# Patient Record
Sex: FEMALE | Race: WHITE | Employment: UNEMPLOYED | URBAN - METROPOLITAN AREA
[De-identification: names, ages, dates, MRNs, and addresses within clinical notes are randomized per-mention and may not be internally consistent; named-entity substitution may affect disease eponyms.]

---

## 2017-01-11 ENCOUNTER — OFFICE VISIT (OUTPATIENT)
Dept: ORTHOPEDIC SURGERY | Age: 82
End: 2017-01-11

## 2017-01-11 VITALS
WEIGHT: 218.92 LBS | SYSTOLIC BLOOD PRESSURE: 100 MMHG | BODY MASS INDEX: 38.79 KG/M2 | DIASTOLIC BLOOD PRESSURE: 70 MMHG | HEIGHT: 63 IN | HEART RATE: 106 BPM

## 2017-01-11 DIAGNOSIS — S83.241A TEAR OF MEDIAL MENISCUS OF RIGHT KNEE, CURRENT, UNSPECIFIED TEAR TYPE, INITIAL ENCOUNTER: Primary | ICD-10-CM

## 2017-01-11 PROCEDURE — 99024 POSTOP FOLLOW-UP VISIT: CPT | Performed by: ORTHOPAEDIC SURGERY

## 2017-04-10 ENCOUNTER — TELEPHONE (OUTPATIENT)
Dept: FAMILY MEDICINE CLINIC | Age: 82
End: 2017-04-10

## 2017-07-17 ENCOUNTER — HOSPITAL ENCOUNTER (OUTPATIENT)
Dept: MAMMOGRAPHY | Age: 82
Discharge: HOME | End: 2017-07-17
Attending: FAMILY MEDICINE | Admitting: FAMILY MEDICINE

## 2017-07-17 ENCOUNTER — OFFICE VISIT (OUTPATIENT)
Dept: FAMILY MEDICINE CLINIC | Age: 82
End: 2017-07-17

## 2017-07-17 VITALS
HEART RATE: 117 BPM | WEIGHT: 217 LBS | HEIGHT: 63 IN | DIASTOLIC BLOOD PRESSURE: 80 MMHG | BODY MASS INDEX: 38.45 KG/M2 | SYSTOLIC BLOOD PRESSURE: 130 MMHG | OXYGEN SATURATION: 97 %

## 2017-07-17 DIAGNOSIS — Z12.31 SCREENING MAMMOGRAM, ENCOUNTER FOR: ICD-10-CM

## 2017-07-17 DIAGNOSIS — I10 ESSENTIAL HYPERTENSION: ICD-10-CM

## 2017-07-17 DIAGNOSIS — M15.9 PRIMARY OSTEOARTHRITIS INVOLVING MULTIPLE JOINTS: ICD-10-CM

## 2017-07-17 DIAGNOSIS — E11.9 TYPE 2 DIABETES MELLITUS WITHOUT COMPLICATION, WITHOUT LONG-TERM CURRENT USE OF INSULIN (HCC): Primary | ICD-10-CM

## 2017-07-17 LAB — HBA1C MFR BLD: 6.7 %

## 2017-07-17 PROCEDURE — 99214 OFFICE O/P EST MOD 30 MIN: CPT | Performed by: FAMILY MEDICINE

## 2017-07-17 PROCEDURE — 83036 HEMOGLOBIN GLYCOSYLATED A1C: CPT | Performed by: FAMILY MEDICINE

## 2017-07-17 RX ORDER — ATORVASTATIN CALCIUM 10 MG/1
TABLET, FILM COATED ORAL
Qty: 45 TABLET | Refills: 0 | Status: SHIPPED | OUTPATIENT
Start: 2017-07-17 | End: 2017-09-18

## 2017-07-17 RX ORDER — LISINOPRIL AND HYDROCHLOROTHIAZIDE 12.5; 1 MG/1; MG/1
TABLET ORAL
Qty: 180 TABLET | Refills: 1 | Status: SHIPPED | OUTPATIENT
Start: 2017-07-17 | End: 2017-12-05 | Stop reason: SDUPTHER

## 2017-07-17 RX ORDER — AMLODIPINE BESYLATE 5 MG/1
TABLET ORAL
Qty: 90 TABLET | Refills: 1 | Status: SHIPPED | OUTPATIENT
Start: 2017-07-17 | End: 2017-12-05 | Stop reason: SDUPTHER

## 2017-07-17 ASSESSMENT — ENCOUNTER SYMPTOMS
RESPIRATORY NEGATIVE: 1
GASTROINTESTINAL NEGATIVE: 1

## 2017-07-17 ASSESSMENT — PATIENT HEALTH QUESTIONNAIRE - PHQ9
SUM OF ALL RESPONSES TO PHQ QUESTIONS 1-9: 0
SUM OF ALL RESPONSES TO PHQ9 QUESTIONS 1 & 2: 0
2. FEELING DOWN, DEPRESSED OR HOPELESS: 0
1. LITTLE INTEREST OR PLEASURE IN DOING THINGS: 0

## 2017-09-18 ENCOUNTER — TELEPHONE (OUTPATIENT)
Dept: FAMILY MEDICINE CLINIC | Age: 82
End: 2017-09-18

## 2017-09-18 RX ORDER — ROSUVASTATIN CALCIUM 5 MG/1
5 TABLET, COATED ORAL NIGHTLY
Qty: 90 TABLET | Refills: 0 | Status: SHIPPED | OUTPATIENT
Start: 2017-09-18 | End: 2019-07-22

## 2017-11-01 NOTE — TELEPHONE ENCOUNTER
Last office visit 7/17/2017     Last written 6/13/17     Next office visit scheduled 1/17/2018    Requested Prescriptions     Pending Prescriptions Disp Refills    metFORMIN (GLUCOPHAGE) 500 MG tablet [Pharmacy Med Name: METFORMIN  MG Tablet] 90 tablet 1     Sig: TAKE 1 TABLET EVERY DAY

## 2017-12-06 RX ORDER — LISINOPRIL AND HYDROCHLOROTHIAZIDE 12.5; 1 MG/1; MG/1
TABLET ORAL
Qty: 180 TABLET | Refills: 1 | Status: SHIPPED | OUTPATIENT
Start: 2017-12-06 | End: 2018-07-12 | Stop reason: SDUPTHER

## 2017-12-06 RX ORDER — AMLODIPINE BESYLATE 5 MG/1
TABLET ORAL
Qty: 90 TABLET | Refills: 1 | Status: SHIPPED | OUTPATIENT
Start: 2017-12-06 | End: 2018-07-12 | Stop reason: SDUPTHER

## 2018-05-02 ENCOUNTER — OFFICE VISIT (OUTPATIENT)
Dept: FAMILY MEDICINE CLINIC | Age: 83
End: 2018-05-02

## 2018-05-02 VITALS
HEART RATE: 115 BPM | BODY MASS INDEX: 38.8 KG/M2 | OXYGEN SATURATION: 96 % | SYSTOLIC BLOOD PRESSURE: 115 MMHG | HEIGHT: 63 IN | DIASTOLIC BLOOD PRESSURE: 68 MMHG | WEIGHT: 219 LBS

## 2018-05-02 DIAGNOSIS — I10 ESSENTIAL HYPERTENSION: ICD-10-CM

## 2018-05-02 DIAGNOSIS — E55.9 VITAMIN D DEFICIENCY: ICD-10-CM

## 2018-05-02 DIAGNOSIS — E78.2 MIXED HYPERLIPIDEMIA: ICD-10-CM

## 2018-05-02 DIAGNOSIS — Z13.29 THYROID DISORDER SCREEN: ICD-10-CM

## 2018-05-02 DIAGNOSIS — E11.9 TYPE 2 DIABETES MELLITUS WITHOUT COMPLICATION, WITHOUT LONG-TERM CURRENT USE OF INSULIN (HCC): Primary | ICD-10-CM

## 2018-05-02 LAB — HBA1C MFR BLD: 7 %

## 2018-05-02 PROCEDURE — G8427 DOCREV CUR MEDS BY ELIG CLIN: HCPCS | Performed by: FAMILY MEDICINE

## 2018-05-02 PROCEDURE — G8400 PT W/DXA NO RESULTS DOC: HCPCS | Performed by: FAMILY MEDICINE

## 2018-05-02 PROCEDURE — 99214 OFFICE O/P EST MOD 30 MIN: CPT | Performed by: FAMILY MEDICINE

## 2018-05-02 PROCEDURE — 1090F PRES/ABSN URINE INCON ASSESS: CPT | Performed by: FAMILY MEDICINE

## 2018-05-02 PROCEDURE — 1036F TOBACCO NON-USER: CPT | Performed by: FAMILY MEDICINE

## 2018-05-02 PROCEDURE — 4040F PNEUMOC VAC/ADMIN/RCVD: CPT | Performed by: FAMILY MEDICINE

## 2018-05-02 PROCEDURE — 1123F ACP DISCUSS/DSCN MKR DOCD: CPT | Performed by: FAMILY MEDICINE

## 2018-05-02 PROCEDURE — 83036 HEMOGLOBIN GLYCOSYLATED A1C: CPT | Performed by: FAMILY MEDICINE

## 2018-05-02 PROCEDURE — G8417 CALC BMI ABV UP PARAM F/U: HCPCS | Performed by: FAMILY MEDICINE

## 2018-05-02 ASSESSMENT — ENCOUNTER SYMPTOMS
RESPIRATORY NEGATIVE: 1
GASTROINTESTINAL NEGATIVE: 1

## 2018-05-03 DIAGNOSIS — E83.52 HYPERCALCEMIA: Primary | ICD-10-CM

## 2018-05-03 LAB
A/G RATIO: 1.8 (ref 1.1–2.2)
ALBUMIN SERPL-MCNC: 4.4 G/DL (ref 3.4–5)
ALP BLD-CCNC: 82 U/L (ref 40–129)
ALT SERPL-CCNC: 19 U/L (ref 10–40)
ANION GAP SERPL CALCULATED.3IONS-SCNC: 16 MMOL/L (ref 3–16)
AST SERPL-CCNC: 21 U/L (ref 15–37)
BASOPHILS ABSOLUTE: 0.1 K/UL (ref 0–0.2)
BASOPHILS RELATIVE PERCENT: 0.8 %
BILIRUB SERPL-MCNC: <0.2 MG/DL (ref 0–1)
BUN BLDV-MCNC: 20 MG/DL (ref 7–20)
CALCIUM SERPL-MCNC: 11.2 MG/DL (ref 8.3–10.6)
CHLORIDE BLD-SCNC: 98 MMOL/L (ref 99–110)
CHOLESTEROL, TOTAL: 283 MG/DL (ref 0–199)
CO2: 28 MMOL/L (ref 21–32)
CREAT SERPL-MCNC: 0.9 MG/DL (ref 0.6–1.2)
EOSINOPHILS ABSOLUTE: 0.2 K/UL (ref 0–0.6)
EOSINOPHILS RELATIVE PERCENT: 2.4 %
GFR AFRICAN AMERICAN: >60
GFR NON-AFRICAN AMERICAN: 60
GLOBULIN: 2.5 G/DL
GLUCOSE BLD-MCNC: 137 MG/DL (ref 70–99)
HCT VFR BLD CALC: 40.2 % (ref 36–48)
HDLC SERPL-MCNC: 57 MG/DL (ref 40–60)
HEMOGLOBIN: 13.5 G/DL (ref 12–16)
LDL CHOLESTEROL CALCULATED: ABNORMAL MG/DL
LDL CHOLESTEROL DIRECT: 177 MG/DL
LYMPHOCYTES ABSOLUTE: 1.8 K/UL (ref 1–5.1)
LYMPHOCYTES RELATIVE PERCENT: 21.8 %
MCH RBC QN AUTO: 28.8 PG (ref 26–34)
MCHC RBC AUTO-ENTMCNC: 33.7 G/DL (ref 31–36)
MCV RBC AUTO: 85.4 FL (ref 80–100)
MONOCYTES ABSOLUTE: 0.6 K/UL (ref 0–1.3)
MONOCYTES RELATIVE PERCENT: 7.5 %
NEUTROPHILS ABSOLUTE: 5.5 K/UL (ref 1.7–7.7)
NEUTROPHILS RELATIVE PERCENT: 67.5 %
PARATHYROID HORMONE INTACT: 77.7 PG/ML (ref 14–72)
PDW BLD-RTO: 14.5 % (ref 12.4–15.4)
PLATELET # BLD: 295 K/UL (ref 135–450)
PMV BLD AUTO: 8.8 FL (ref 5–10.5)
POTASSIUM SERPL-SCNC: 4.4 MMOL/L (ref 3.5–5.1)
RBC # BLD: 4.7 M/UL (ref 4–5.2)
SODIUM BLD-SCNC: 142 MMOL/L (ref 136–145)
T4 FREE: 1.1 NG/DL (ref 0.9–1.8)
TOTAL PROTEIN: 6.9 G/DL (ref 6.4–8.2)
TRIGL SERPL-MCNC: 389 MG/DL (ref 0–150)
TSH SERPL DL<=0.05 MIU/L-ACNC: 4.03 UIU/ML (ref 0.27–4.2)
VITAMIN D 25-HYDROXY: 37.2 NG/ML
VLDLC SERPL CALC-MCNC: ABNORMAL MG/DL
WBC # BLD: 8.1 K/UL (ref 4–11)

## 2018-05-03 RX ORDER — GLUCOSAMINE HCL/CHONDROITIN SU 500-400 MG
1 CAPSULE ORAL 3 TIMES DAILY
Qty: 100 STRIP | Refills: 3 | Status: SHIPPED | OUTPATIENT
Start: 2018-05-03

## 2018-07-12 RX ORDER — AMLODIPINE BESYLATE 5 MG/1
TABLET ORAL
Qty: 90 TABLET | Refills: 1 | Status: SHIPPED | OUTPATIENT
Start: 2018-07-12 | End: 2019-01-07 | Stop reason: SDUPTHER

## 2018-07-12 RX ORDER — LISINOPRIL AND HYDROCHLOROTHIAZIDE 12.5; 1 MG/1; MG/1
TABLET ORAL
Qty: 180 TABLET | Refills: 1 | Status: SHIPPED | OUTPATIENT
Start: 2018-07-12 | End: 2019-01-07 | Stop reason: SDUPTHER

## 2018-07-12 NOTE — TELEPHONE ENCOUNTER
Last Seen: 5/2/2018  Next Appointment: 11/7/2018    Requested Prescriptions     Pending Prescriptions Disp Refills    lisinopril-hydrochlorothiazide (PRINZIDE;ZESTORETIC) 10-12.5 MG per tablet [Pharmacy Med Name: LISINOPRIL/HYDROCHLOROTHIAZIDE 10-12.5 MG Tablet] 180 tablet 1     Sig: TAKE 1 TABLET TWICE DAILY    amLODIPine (NORVASC) 5 MG tablet [Pharmacy Med Name: AMLODIPINE BESYLATE 5 MG Tablet] 90 tablet 1     Sig: TAKE 1 TABLET EVERY DAY

## 2018-11-07 ENCOUNTER — OFFICE VISIT (OUTPATIENT)
Dept: FAMILY MEDICINE CLINIC | Age: 83
End: 2018-11-07
Payer: MEDICARE

## 2018-11-07 VITALS
SYSTOLIC BLOOD PRESSURE: 108 MMHG | HEART RATE: 100 BPM | OXYGEN SATURATION: 93 % | DIASTOLIC BLOOD PRESSURE: 62 MMHG | BODY MASS INDEX: 38.09 KG/M2 | WEIGHT: 215 LBS | HEIGHT: 63 IN

## 2018-11-07 DIAGNOSIS — I10 ESSENTIAL HYPERTENSION: ICD-10-CM

## 2018-11-07 DIAGNOSIS — M79.674 GREAT TOE PAIN, RIGHT: ICD-10-CM

## 2018-11-07 DIAGNOSIS — E11.9 TYPE 2 DIABETES MELLITUS WITHOUT COMPLICATION, WITHOUT LONG-TERM CURRENT USE OF INSULIN (HCC): Primary | ICD-10-CM

## 2018-11-07 PROBLEM — E79.0 HYPERURICEMIA: Status: ACTIVE | Noted: 2018-11-07

## 2018-11-07 LAB
HBA1C MFR BLD: 7 %
URIC ACID, SERUM: 8.7 MG/DL (ref 2.6–6)

## 2018-11-07 PROCEDURE — 1090F PRES/ABSN URINE INCON ASSESS: CPT | Performed by: FAMILY MEDICINE

## 2018-11-07 PROCEDURE — G8427 DOCREV CUR MEDS BY ELIG CLIN: HCPCS | Performed by: FAMILY MEDICINE

## 2018-11-07 PROCEDURE — G8482 FLU IMMUNIZE ORDER/ADMIN: HCPCS | Performed by: FAMILY MEDICINE

## 2018-11-07 PROCEDURE — 1036F TOBACCO NON-USER: CPT | Performed by: FAMILY MEDICINE

## 2018-11-07 PROCEDURE — 1101F PT FALLS ASSESS-DOCD LE1/YR: CPT | Performed by: FAMILY MEDICINE

## 2018-11-07 PROCEDURE — G8417 CALC BMI ABV UP PARAM F/U: HCPCS | Performed by: FAMILY MEDICINE

## 2018-11-07 PROCEDURE — 99214 OFFICE O/P EST MOD 30 MIN: CPT | Performed by: FAMILY MEDICINE

## 2018-11-07 PROCEDURE — 4040F PNEUMOC VAC/ADMIN/RCVD: CPT | Performed by: FAMILY MEDICINE

## 2018-11-07 PROCEDURE — 83036 HEMOGLOBIN GLYCOSYLATED A1C: CPT | Performed by: FAMILY MEDICINE

## 2018-11-07 PROCEDURE — 1123F ACP DISCUSS/DSCN MKR DOCD: CPT | Performed by: FAMILY MEDICINE

## 2018-11-07 PROCEDURE — G8400 PT W/DXA NO RESULTS DOC: HCPCS | Performed by: FAMILY MEDICINE

## 2018-11-07 RX ORDER — ALLOPURINOL 100 MG/1
100 TABLET ORAL DAILY
Qty: 90 TABLET | Refills: 0 | Status: SHIPPED | OUTPATIENT
Start: 2018-11-07 | End: 2019-07-22

## 2018-11-07 ASSESSMENT — PATIENT HEALTH QUESTIONNAIRE - PHQ9
2. FEELING DOWN, DEPRESSED OR HOPELESS: 0
SUM OF ALL RESPONSES TO PHQ QUESTIONS 1-9: 0
SUM OF ALL RESPONSES TO PHQ QUESTIONS 1-9: 0
1. LITTLE INTEREST OR PLEASURE IN DOING THINGS: 0
SUM OF ALL RESPONSES TO PHQ9 QUESTIONS 1 & 2: 0

## 2018-11-07 ASSESSMENT — ENCOUNTER SYMPTOMS
ABDOMINAL PAIN: 0
SHORTNESS OF BREATH: 0
COUGH: 0
BLOOD IN STOOL: 0

## 2018-11-07 NOTE — PATIENT INSTRUCTIONS
Type 2 diabetes mellitus without complication, without long-term current use of insulin (HCC)  -     POCT glycosylated hemoglobin (Hb A1C)  AIC 7.0-continue meds-lower carbs  Essential hypertension  Continue meds-JOSEP diet     See me 6 mos

## 2018-11-07 NOTE — PROGRESS NOTES
Subjective:      Patient ID: Golden Orozco is a 81 y.o. female. HPI  In for check on DM(OK at home)--HT(not checking-will start)--Had episode of pain Rt MT-P jtlasted 5 days-no meds    Prior to Visit Medications :  Medication metFORMIN (GLUCOPHAGE) 500 MG tablet, Sig TAKE 1 TABLET EVERY DAY, Taking? Yes, Authorizing Provider Gilbert Santos, DO    Medication lisinopril-hydrochlorothiazide (PRINZIDE;ZESTORETIC) 10-12.5 MG per tablet, Sig TAKE 1 TABLET TWICE DAILY, Taking? Yes, Authorizing Provider Gilbert Santos, DO    Medication amLODIPine (NORVASC) 5 MG tablet, Sig TAKE 1 TABLET EVERY DAY, Taking? Yes, Authorizing Provider Gilbert Santos, DO    Medication Glucose Blood (BLOOD GLUCOSE TEST STRIPS) STRP, Sig 1 strip by Does not apply route three times daily Patient requesting True Results brand, Taking? Yes, Authorizing Provider Gilbert Santos, DO    Medication rosuvastatin (CRESTOR) 5 MG tablet, Sig Take 1 tablet by mouth nightly, Taking? Yes, Authorizing Provider Gilbert Santos, DO    Medication glucose monitoring kit (FREESTYLE) monitoring kit, Sig 1 kit by Does not apply route daily as needed (not prn) Dx E11.9-uses once daily-needs Acucheck Daphne Plus, Taking? Yes, Authorizing Provider Gilbert Santos, DO    Medication glucose blood VI test strips (EXACTECH TEST) strip, Sig 1 each by In Vitro route daily ACCUCHECK DAPHNE-Dx e11.9-uses 1 daily-no isulin, Taking? Yes, Authorizing Provider Marisol Santos, DO    Medication multivitamin (THERAGRAN) per tablet, Sig Take 1 tablet by mouth daily. Indications: OTC, Taking? Yes, Authorizing Provider Jaun An MD    Medication VITAMIN D-3 (COLECALCIFEROL) 400 UNITS TABS, Sig Take  by mouth daily. Indications: OTC, Taking? Yes, Authorizing Provider Jaun An MD    Medication aspirin 81 MG EC tablet, Sig Take 81 mg by mouth daily. Indications: OTC, Taking?  Yes, Authorizing Provider Jaun An MD      Past Medical History:  No date:

## 2018-11-21 ENCOUNTER — HOSPITAL ENCOUNTER (OUTPATIENT)
Dept: MAMMOGRAPHY | Age: 83
Discharge: HOME | End: 2018-11-21
Payer: MEDICARE

## 2018-11-21 DIAGNOSIS — Z12.31 SCREENING MAMMOGRAM, ENCOUNTER FOR: ICD-10-CM

## 2018-11-21 PROCEDURE — 77067 SCR MAMMO BI INCL CAD: CPT

## 2019-01-08 RX ORDER — LISINOPRIL AND HYDROCHLOROTHIAZIDE 12.5; 1 MG/1; MG/1
TABLET ORAL
Qty: 180 TABLET | Refills: 1 | Status: SHIPPED | OUTPATIENT
Start: 2019-01-08 | End: 2019-06-19 | Stop reason: SDUPTHER

## 2019-01-08 RX ORDER — AMLODIPINE BESYLATE 5 MG/1
TABLET ORAL
Qty: 90 TABLET | Refills: 1 | Status: SHIPPED | OUTPATIENT
Start: 2019-01-08 | End: 2019-06-19 | Stop reason: SDUPTHER

## 2019-05-15 ENCOUNTER — OFFICE VISIT (OUTPATIENT)
Dept: FAMILY MEDICINE CLINIC | Age: 84
End: 2019-05-15
Payer: MEDICARE

## 2019-05-15 VITALS
BODY MASS INDEX: 37.74 KG/M2 | HEIGHT: 63 IN | HEART RATE: 132 BPM | WEIGHT: 213 LBS | DIASTOLIC BLOOD PRESSURE: 80 MMHG | SYSTOLIC BLOOD PRESSURE: 135 MMHG | OXYGEN SATURATION: 93 %

## 2019-05-15 DIAGNOSIS — M15.9 PRIMARY OSTEOARTHRITIS INVOLVING MULTIPLE JOINTS: ICD-10-CM

## 2019-05-15 DIAGNOSIS — I10 ESSENTIAL HYPERTENSION: ICD-10-CM

## 2019-05-15 DIAGNOSIS — L27.0 DERMATITIS DUE TO DRUG: ICD-10-CM

## 2019-05-15 DIAGNOSIS — E11.9 TYPE 2 DIABETES MELLITUS WITHOUT COMPLICATION, WITHOUT LONG-TERM CURRENT USE OF INSULIN (HCC): Primary | ICD-10-CM

## 2019-05-15 LAB — HBA1C MFR BLD: 7.3 %

## 2019-05-15 PROCEDURE — 99214 OFFICE O/P EST MOD 30 MIN: CPT | Performed by: FAMILY MEDICINE

## 2019-05-15 PROCEDURE — 1036F TOBACCO NON-USER: CPT | Performed by: FAMILY MEDICINE

## 2019-05-15 PROCEDURE — 1123F ACP DISCUSS/DSCN MKR DOCD: CPT | Performed by: FAMILY MEDICINE

## 2019-05-15 PROCEDURE — G8400 PT W/DXA NO RESULTS DOC: HCPCS | Performed by: FAMILY MEDICINE

## 2019-05-15 PROCEDURE — 1090F PRES/ABSN URINE INCON ASSESS: CPT | Performed by: FAMILY MEDICINE

## 2019-05-15 PROCEDURE — G8417 CALC BMI ABV UP PARAM F/U: HCPCS | Performed by: FAMILY MEDICINE

## 2019-05-15 PROCEDURE — 4040F PNEUMOC VAC/ADMIN/RCVD: CPT | Performed by: FAMILY MEDICINE

## 2019-05-15 PROCEDURE — 83036 HEMOGLOBIN GLYCOSYLATED A1C: CPT | Performed by: FAMILY MEDICINE

## 2019-05-15 PROCEDURE — G8427 DOCREV CUR MEDS BY ELIG CLIN: HCPCS | Performed by: FAMILY MEDICINE

## 2019-05-15 RX ORDER — MELOXICAM 15 MG/1
15 TABLET ORAL DAILY
Qty: 30 TABLET | Refills: 0 | Status: SHIPPED | OUTPATIENT
Start: 2019-05-15 | End: 2019-07-22

## 2019-05-15 ASSESSMENT — ENCOUNTER SYMPTOMS
COUGH: 0
SHORTNESS OF BREATH: 0
ABDOMINAL PAIN: 0
BLOOD IN STOOL: 0

## 2019-05-15 NOTE — PROGRESS NOTES
Subjective:      Patient ID: Duane Dross is a 81 y.o. female. HPI  In for check on DM(checks occas)--HT(OK at home)--OA(would like something). [place] ankle-onset 2 weeks(no traveling). Rash on forearms & legs from Motrin(?)-getting better-onset 3 weeks. Prior to Visit Medications :  Medication Multiple Vitamins-Minerals (CENTRUM ADULTS PO), Sig Take by mouth, Taking? Yes, Authorizing Provider Jaun An MD    Medication meloxicam (MOBIC) 15 MG tablet, Sig Take 1 tablet by mouth daily, Taking? Yes, Authorizing Provider Gilbert Santos, DO    Medication metFORMIN (GLUCOPHAGE) 500 MG tablet, Sig TAKE 1 TABLET EVERY DAY, Taking? Yes, Authorizing Provider Gilbert Santos, DO    Medication lisinopril-hydrochlorothiazide (PRINZIDE;ZESTORETIC) 10-12.5 MG per tablet, Sig TAKE 1 TABLET TWICE DAILY, Taking? Yes, Authorizing Provider Gilbert Santos, DO    Medication amLODIPine (NORVASC) 5 MG tablet, Sig TAKE 1 TABLET EVERY DAY, Taking? Yes, Authorizing Provider Gilbert Santos, DO    Medication Glucose Blood (BLOOD GLUCOSE TEST STRIPS) STRP, Sig 1 strip by Does not apply route three times daily Patient requesting True Results brand, Taking? Yes, Authorizing Provider Gilbert Santos, DO    Medication glucose monitoring kit (FREESTYLE) monitoring kit, Sig 1 kit by Does not apply route daily as needed (not prn) Dx E11.9-uses once daily-needs Acucheck Daphne Plus, Taking? Yes, Authorizing Provider Gilbert Santos, DO    Medication glucose blood VI test strips (EXACTECH TEST) strip, Sig 1 each by In Vitro route daily ACCUCHECK DAPHNE-Dx e11.9-uses 1 daily-no isulin, Taking? Yes, Authorizing Provider Gilbert Santos, DO    Medication VITAMIN D-3 (COLECALCIFEROL) 400 UNITS TABS, Sig Take  by mouth daily. Indications: OTC, Taking? Yes, Authorizing Provider Jaun An MD    Medication aspirin 81 MG EC tablet, Sig Take 81 mg by mouth daily. Indications: OTC, Taking?  Yes, Authorizing Provider Jaun An diet  Primary osteoarthritis involving multiple joints  Rx Mobic 15  Other orders  -     meloxicam (MOBIC) 15 MG tablet;  Take 1 tablet by mouth daily  Dermatitis-see above    See me 6 mos        Gilbert Santos, DO

## 2019-05-15 NOTE — PATIENT INSTRUCTIONS
Type 2 diabetes mellitus without complication, without long-term current use of insulin (HCC)  -     POCT glycosylated hemoglobin (Hb A1C)  AIC 7.3-continue meds-lower carb  Essential hypertension  Continue meds-JOSEP diet  Primary osteoarthritis involving multiple joints  Rx Mobic 15  Other orders  -     meloxicam (MOBIC) 15 MG tablet;  Take 1 tablet by mouth daily  Dermatitis-see above    See me 6 mos

## 2019-06-21 RX ORDER — LISINOPRIL AND HYDROCHLOROTHIAZIDE 12.5; 1 MG/1; MG/1
TABLET ORAL
Qty: 180 TABLET | Refills: 1 | Status: SHIPPED | OUTPATIENT
Start: 2019-06-21 | End: 2019-12-03 | Stop reason: SDUPTHER

## 2019-06-21 RX ORDER — AMLODIPINE BESYLATE 5 MG/1
TABLET ORAL
Qty: 90 TABLET | Refills: 1 | Status: SHIPPED | OUTPATIENT
Start: 2019-06-21 | End: 2019-07-22 | Stop reason: SINTOL

## 2019-07-22 ENCOUNTER — NURSE TRIAGE (OUTPATIENT)
Dept: OTHER | Facility: CLINIC | Age: 84
End: 2019-07-22

## 2019-07-22 ENCOUNTER — OFFICE VISIT (OUTPATIENT)
Dept: FAMILY MEDICINE CLINIC | Age: 84
End: 2019-07-22
Payer: MEDICARE

## 2019-07-22 VITALS
DIASTOLIC BLOOD PRESSURE: 78 MMHG | RESPIRATION RATE: 16 BRPM | WEIGHT: 210 LBS | HEART RATE: 114 BPM | BODY MASS INDEX: 37.21 KG/M2 | TEMPERATURE: 97.8 F | HEIGHT: 63 IN | OXYGEN SATURATION: 98 % | SYSTOLIC BLOOD PRESSURE: 118 MMHG

## 2019-07-22 DIAGNOSIS — L30.9 DERMATITIS: ICD-10-CM

## 2019-07-22 DIAGNOSIS — I10 ESSENTIAL HYPERTENSION: Primary | ICD-10-CM

## 2019-07-22 DIAGNOSIS — R00.0 TACHYCARDIA: ICD-10-CM

## 2019-07-22 PROBLEM — M79.674 GREAT TOE PAIN, RIGHT: Status: RESOLVED | Noted: 2018-11-07 | Resolved: 2019-07-22

## 2019-07-22 PROCEDURE — 1123F ACP DISCUSS/DSCN MKR DOCD: CPT | Performed by: NURSE PRACTITIONER

## 2019-07-22 PROCEDURE — G8417 CALC BMI ABV UP PARAM F/U: HCPCS | Performed by: NURSE PRACTITIONER

## 2019-07-22 PROCEDURE — 1090F PRES/ABSN URINE INCON ASSESS: CPT | Performed by: NURSE PRACTITIONER

## 2019-07-22 PROCEDURE — G8427 DOCREV CUR MEDS BY ELIG CLIN: HCPCS | Performed by: NURSE PRACTITIONER

## 2019-07-22 PROCEDURE — G8400 PT W/DXA NO RESULTS DOC: HCPCS | Performed by: NURSE PRACTITIONER

## 2019-07-22 PROCEDURE — 4040F PNEUMOC VAC/ADMIN/RCVD: CPT | Performed by: NURSE PRACTITIONER

## 2019-07-22 PROCEDURE — 99213 OFFICE O/P EST LOW 20 MIN: CPT | Performed by: NURSE PRACTITIONER

## 2019-07-22 PROCEDURE — 1036F TOBACCO NON-USER: CPT | Performed by: NURSE PRACTITIONER

## 2019-07-22 RX ORDER — METOPROLOL SUCCINATE 50 MG/1
50 TABLET, EXTENDED RELEASE ORAL DAILY
Qty: 90 TABLET | Refills: 1 | Status: SHIPPED | OUTPATIENT
Start: 2019-07-22 | End: 2019-12-03 | Stop reason: SDUPTHER

## 2019-07-22 RX ORDER — TRIAMCINOLONE ACETONIDE 1 MG/G
CREAM TOPICAL
Qty: 30 G | Refills: 0 | Status: SHIPPED | OUTPATIENT
Start: 2019-07-22 | End: 2019-08-21 | Stop reason: SDUPTHER

## 2019-07-22 ASSESSMENT — PATIENT HEALTH QUESTIONNAIRE - PHQ9
2. FEELING DOWN, DEPRESSED OR HOPELESS: 0
SUM OF ALL RESPONSES TO PHQ9 QUESTIONS 1 & 2: 0
1. LITTLE INTEREST OR PLEASURE IN DOING THINGS: 0
SUM OF ALL RESPONSES TO PHQ QUESTIONS 1-9: 0
SUM OF ALL RESPONSES TO PHQ QUESTIONS 1-9: 0

## 2019-07-22 ASSESSMENT — ENCOUNTER SYMPTOMS
NAUSEA: 0
CONSTIPATION: 0
SHORTNESS OF BREATH: 0
BACK PAIN: 0
COUGH: 0
CHEST TIGHTNESS: 0

## 2019-07-22 NOTE — PROGRESS NOTES
TABLET EVERY DAY 90 tablet 1    metFORMIN (GLUCOPHAGE) 500 MG tablet TAKE 1 TABLET EVERY DAY 90 tablet 0    Multiple Vitamins-Minerals (CENTRUM ADULTS PO) Take by mouth      blood glucose test strips (EXACTECH TEST) strip 1 each by In Vitro route daily Right [employer]-Dx e11.9-uses 1 daily-no isulin 100 each 3    Glucose Blood (BLOOD GLUCOSE TEST STRIPS) STRP 1 strip by Does not apply route three times daily Patient requesting True Results brand 100 strip 3    glucose monitoring kit (FREESTYLE) monitoring kit 1 kit by Does not apply route daily as needed (not prn) Dx E11.9-uses once daily-needs Acucheck Daphne Plus 1 kit 0    VITAMIN D-3 (COLECALCIFEROL) 400 UNITS TABS Take  by mouth daily. Indications: OTC      aspirin 81 MG EC tablet Take 81 mg by mouth daily. Indications: OTC       No current facility-administered medications for this visit. Assessment:      1. Edema-amlodipine related  2. Dermatitis left leg  3. HTN-stable  4. tachycardia      Plan:      1. Reviewed food labels. Keep sodium less than 1800 mg daily. 2. Will stop amlodipine    3. Ramon Cope was seen today for foot swelling. Diagnoses and all orders for this visit:    Essential hypertension  -     metoprolol succinate (TOPROL XL) 50 MG extended release tablet;  Take 1 tablet by mouth daily    Dermatitis  -     triamcinolone (KENALOG) 0.1 % cream; Apply small amount 2 times daily      Avoid picking at scabs on leg            POOL HAWK - CNP

## 2019-08-21 ENCOUNTER — OFFICE VISIT (OUTPATIENT)
Dept: FAMILY MEDICINE CLINIC | Age: 84
End: 2019-08-21
Payer: MEDICARE

## 2019-08-21 VITALS
DIASTOLIC BLOOD PRESSURE: 82 MMHG | HEIGHT: 63 IN | SYSTOLIC BLOOD PRESSURE: 135 MMHG | OXYGEN SATURATION: 92 % | WEIGHT: 207.4 LBS | BODY MASS INDEX: 36.75 KG/M2 | HEART RATE: 89 BPM

## 2019-08-21 DIAGNOSIS — L30.9 DERMATITIS: Primary | ICD-10-CM

## 2019-08-21 PROCEDURE — 1036F TOBACCO NON-USER: CPT | Performed by: FAMILY MEDICINE

## 2019-08-21 PROCEDURE — 99213 OFFICE O/P EST LOW 20 MIN: CPT | Performed by: FAMILY MEDICINE

## 2019-08-21 PROCEDURE — 1123F ACP DISCUSS/DSCN MKR DOCD: CPT | Performed by: FAMILY MEDICINE

## 2019-08-21 PROCEDURE — G8417 CALC BMI ABV UP PARAM F/U: HCPCS | Performed by: FAMILY MEDICINE

## 2019-08-21 PROCEDURE — 4040F PNEUMOC VAC/ADMIN/RCVD: CPT | Performed by: FAMILY MEDICINE

## 2019-08-21 PROCEDURE — G8427 DOCREV CUR MEDS BY ELIG CLIN: HCPCS | Performed by: FAMILY MEDICINE

## 2019-08-21 PROCEDURE — G8400 PT W/DXA NO RESULTS DOC: HCPCS | Performed by: FAMILY MEDICINE

## 2019-08-21 PROCEDURE — 1090F PRES/ABSN URINE INCON ASSESS: CPT | Performed by: FAMILY MEDICINE

## 2019-08-21 RX ORDER — MELOXICAM 15 MG/1
15 TABLET ORAL DAILY
Qty: 30 TABLET | Refills: 0 | Status: SHIPPED | OUTPATIENT
Start: 2019-08-21 | End: 2021-10-12

## 2019-08-21 RX ORDER — TRIAMCINOLONE ACETONIDE 1 MG/G
CREAM TOPICAL
Qty: 30 G | Refills: 0 | Status: SHIPPED | OUTPATIENT
Start: 2019-08-21 | End: 2021-10-12

## 2019-08-21 ASSESSMENT — ENCOUNTER SYMPTOMS: ROS SKIN COMMENTS: SEE HPI AND PHOTO.

## 2019-11-06 ENCOUNTER — OFFICE VISIT (OUTPATIENT)
Dept: FAMILY MEDICINE CLINIC | Age: 84
End: 2019-11-06
Payer: MEDICARE

## 2019-11-06 VITALS
HEART RATE: 88 BPM | OXYGEN SATURATION: 91 % | DIASTOLIC BLOOD PRESSURE: 75 MMHG | WEIGHT: 207 LBS | HEIGHT: 64 IN | SYSTOLIC BLOOD PRESSURE: 140 MMHG | BODY MASS INDEX: 35.34 KG/M2

## 2019-11-06 DIAGNOSIS — M15.9 PRIMARY OSTEOARTHRITIS INVOLVING MULTIPLE JOINTS: ICD-10-CM

## 2019-11-06 DIAGNOSIS — I10 ESSENTIAL HYPERTENSION: ICD-10-CM

## 2019-11-06 DIAGNOSIS — E11.9 TYPE 2 DIABETES MELLITUS WITHOUT COMPLICATION, WITHOUT LONG-TERM CURRENT USE OF INSULIN (HCC): Primary | ICD-10-CM

## 2019-11-06 LAB — HBA1C MFR BLD: 7 %

## 2019-11-06 PROCEDURE — G8482 FLU IMMUNIZE ORDER/ADMIN: HCPCS | Performed by: FAMILY MEDICINE

## 2019-11-06 PROCEDURE — G8400 PT W/DXA NO RESULTS DOC: HCPCS | Performed by: FAMILY MEDICINE

## 2019-11-06 PROCEDURE — 83036 HEMOGLOBIN GLYCOSYLATED A1C: CPT | Performed by: FAMILY MEDICINE

## 2019-11-06 PROCEDURE — G8427 DOCREV CUR MEDS BY ELIG CLIN: HCPCS | Performed by: FAMILY MEDICINE

## 2019-11-06 PROCEDURE — G8417 CALC BMI ABV UP PARAM F/U: HCPCS | Performed by: FAMILY MEDICINE

## 2019-11-06 PROCEDURE — 1123F ACP DISCUSS/DSCN MKR DOCD: CPT | Performed by: FAMILY MEDICINE

## 2019-11-06 PROCEDURE — 99214 OFFICE O/P EST MOD 30 MIN: CPT | Performed by: FAMILY MEDICINE

## 2019-11-06 PROCEDURE — 1090F PRES/ABSN URINE INCON ASSESS: CPT | Performed by: FAMILY MEDICINE

## 2019-11-06 PROCEDURE — 4040F PNEUMOC VAC/ADMIN/RCVD: CPT | Performed by: FAMILY MEDICINE

## 2019-11-06 PROCEDURE — 1036F TOBACCO NON-USER: CPT | Performed by: FAMILY MEDICINE

## 2019-11-06 ASSESSMENT — ENCOUNTER SYMPTOMS
SHORTNESS OF BREATH: 0
CHEST TIGHTNESS: 0
BLOOD IN STOOL: 0
ABDOMINAL PAIN: 0

## 2020-02-03 ENCOUNTER — TELEPHONE (OUTPATIENT)
Dept: FAMILY MEDICINE CLINIC | Age: 85
End: 2020-02-03

## 2020-02-03 NOTE — TELEPHONE ENCOUNTER
Patient is requesting Rx for handicap placard renewal.  She is asking if it can be mailed out to her when completed.

## 2020-04-15 ENCOUNTER — TELEPHONE (OUTPATIENT)
Dept: FAMILY MEDICINE CLINIC | Age: 85
End: 2020-04-15

## 2020-06-25 ENCOUNTER — OFFICE VISIT (OUTPATIENT)
Dept: FAMILY MEDICINE CLINIC | Age: 85
End: 2020-06-25
Payer: MEDICARE

## 2020-06-25 VITALS
BODY MASS INDEX: 36.43 KG/M2 | TEMPERATURE: 97.5 F | HEIGHT: 63 IN | HEART RATE: 88 BPM | SYSTOLIC BLOOD PRESSURE: 128 MMHG | WEIGHT: 205.6 LBS | DIASTOLIC BLOOD PRESSURE: 78 MMHG | OXYGEN SATURATION: 92 %

## 2020-06-25 LAB — HBA1C MFR BLD: 6.4 %

## 2020-06-25 PROCEDURE — G8427 DOCREV CUR MEDS BY ELIG CLIN: HCPCS | Performed by: FAMILY MEDICINE

## 2020-06-25 PROCEDURE — 99214 OFFICE O/P EST MOD 30 MIN: CPT | Performed by: FAMILY MEDICINE

## 2020-06-25 PROCEDURE — 83036 HEMOGLOBIN GLYCOSYLATED A1C: CPT | Performed by: FAMILY MEDICINE

## 2020-06-25 PROCEDURE — 1036F TOBACCO NON-USER: CPT | Performed by: FAMILY MEDICINE

## 2020-06-25 PROCEDURE — G8417 CALC BMI ABV UP PARAM F/U: HCPCS | Performed by: FAMILY MEDICINE

## 2020-06-25 PROCEDURE — 4040F PNEUMOC VAC/ADMIN/RCVD: CPT | Performed by: FAMILY MEDICINE

## 2020-06-25 PROCEDURE — 1090F PRES/ABSN URINE INCON ASSESS: CPT | Performed by: FAMILY MEDICINE

## 2020-06-25 PROCEDURE — 1123F ACP DISCUSS/DSCN MKR DOCD: CPT | Performed by: FAMILY MEDICINE

## 2020-06-25 PROCEDURE — G8400 PT W/DXA NO RESULTS DOC: HCPCS | Performed by: FAMILY MEDICINE

## 2020-06-25 ASSESSMENT — ENCOUNTER SYMPTOMS
ABDOMINAL PAIN: 0
SHORTNESS OF BREATH: 0
CHEST TIGHTNESS: 0
CONSTIPATION: 0
COUGH: 0
BLOOD IN STOOL: 0

## 2020-06-25 NOTE — PROGRESS NOTES
Normal heart sounds. Pulmonary:      Effort: Pulmonary effort is normal.      Breath sounds: Normal breath sounds. Abdominal:      General: Bowel sounds are normal. There is no distension. Palpations: Abdomen is soft. There is no mass. Tenderness: There is no abdominal tenderness. Lymphadenopathy:      Cervical: No cervical adenopathy. Skin:     General: Skin is warm and dry. Coloration: Skin is not pale. Neurological:      Mental Status: She is alert and oriented to person, place, and time. Motor: No abnormal muscle tone. Psychiatric:         Mood and Affect: Mood normal.         Behavior: Behavior normal.         Thought Content: Thought content normal.         Judgment: Judgment normal.         Assessment:       Diagnosis Orders   1. Essential hypertension  CBC Auto Differential    Comprehensive Metabolic Panel    Lipid Panel   2. Type 2 diabetes mellitus without complication, without long-term current use of insulin ([facility])  POCT glycosylated hemoglobin (Hb A1C)    CBC Auto Differential    Comprehensive Metabolic Panel    Lipid Panel   3. Primary osteoarthritis involving multiple joints           Plan:      Oliva Anderson was seen today for 6 month follow-up. Diagnoses and all orders for this visit:    Essential hypertension  -     CBC Auto Differential  -     Comprehensive Metabolic Panel  -     Lipid Panel  Continue medications and no added salt diet.   Work on some slow weight loss by decreasing carbs and increasing activity as tolerated  Type 2 diabetes mellitus without complication, without long-term current use of insulin (HCC)  -     POCT glycosylated hemoglobin (Hb A1C)  -     CBC Auto Differential  -     Comprehensive Metabolic Panel  -     Lipid Panel  A1c 6.4 and last time it was 7.0-continue medications and weight loss as above  Primary osteoarthritis involving multiple joints  Over-the-counter medication as needed for discomfort  Other orders  -     metFORMIN (GLUCOPHAGE)

## 2020-06-26 LAB
A/G RATIO: 1.7 (ref 1.1–2.2)
ALBUMIN SERPL-MCNC: 4.2 G/DL (ref 3.4–5)
ALP BLD-CCNC: 72 U/L (ref 40–129)
ALT SERPL-CCNC: 13 U/L (ref 10–40)
ANION GAP SERPL CALCULATED.3IONS-SCNC: 11 MMOL/L (ref 3–16)
AST SERPL-CCNC: 18 U/L (ref 15–37)
BASOPHILS ABSOLUTE: 0 K/UL (ref 0–0.2)
BASOPHILS RELATIVE PERCENT: 0.6 %
BILIRUB SERPL-MCNC: 0.3 MG/DL (ref 0–1)
BUN BLDV-MCNC: 20 MG/DL (ref 7–20)
CALCIUM SERPL-MCNC: 11 MG/DL (ref 8.3–10.6)
CHLORIDE BLD-SCNC: 99 MMOL/L (ref 99–110)
CHOLESTEROL, TOTAL: 263 MG/DL (ref 0–199)
CO2: 28 MMOL/L (ref 21–32)
CREAT SERPL-MCNC: 0.9 MG/DL (ref 0.6–1.2)
EOSINOPHILS ABSOLUTE: 0.5 K/UL (ref 0–0.6)
EOSINOPHILS RELATIVE PERCENT: 6.6 %
GFR AFRICAN AMERICAN: >60
GFR NON-AFRICAN AMERICAN: 60
GLOBULIN: 2.5 G/DL
GLUCOSE BLD-MCNC: 106 MG/DL (ref 70–99)
HCT VFR BLD CALC: 41.6 % (ref 36–48)
HDLC SERPL-MCNC: 54 MG/DL (ref 40–60)
HEMOGLOBIN: 13.6 G/DL (ref 12–16)
LDL CHOLESTEROL CALCULATED: ABNORMAL MG/DL
LDL CHOLESTEROL DIRECT: 162 MG/DL
LYMPHOCYTES ABSOLUTE: 1.7 K/UL (ref 1–5.1)
LYMPHOCYTES RELATIVE PERCENT: 21.6 %
MCH RBC QN AUTO: 28.3 PG (ref 26–34)
MCHC RBC AUTO-ENTMCNC: 32.7 G/DL (ref 31–36)
MCV RBC AUTO: 86.7 FL (ref 80–100)
MONOCYTES ABSOLUTE: 0.5 K/UL (ref 0–1.3)
MONOCYTES RELATIVE PERCENT: 6.5 %
NEUTROPHILS ABSOLUTE: 5.2 K/UL (ref 1.7–7.7)
NEUTROPHILS RELATIVE PERCENT: 64.7 %
PDW BLD-RTO: 15.3 % (ref 12.4–15.4)
PLATELET # BLD: 297 K/UL (ref 135–450)
PMV BLD AUTO: 9 FL (ref 5–10.5)
POTASSIUM SERPL-SCNC: 4 MMOL/L (ref 3.5–5.1)
RBC # BLD: 4.8 M/UL (ref 4–5.2)
SODIUM BLD-SCNC: 138 MMOL/L (ref 136–145)
TOTAL PROTEIN: 6.7 G/DL (ref 6.4–8.2)
TRIGL SERPL-MCNC: 393 MG/DL (ref 0–150)
VLDLC SERPL CALC-MCNC: ABNORMAL MG/DL
WBC # BLD: 8 K/UL (ref 4–11)

## 2020-09-01 RX ORDER — BLOOD SUGAR DIAGNOSTIC
STRIP MISCELLANEOUS
Qty: 100 STRIP | Refills: 5 | Status: SHIPPED | OUTPATIENT
Start: 2020-09-01 | End: 2022-01-06

## 2020-09-01 NOTE — TELEPHONE ENCOUNTER
.  Last office visit 6/25/2020     Last written 5-15-19 100 with 3      Next office visit scheduled 11/23/2020    Requested Prescriptions     Pending Prescriptions Disp Refills    ACCU-CHEK JERRY PLUS strip [Pharmacy Med Name: ACCU-CHEK JERRY PLUS   Strip] 100 strip 5     Sig: USE TO TEST BLOOD SUGAR ONCE DAILY

## 2020-11-23 ENCOUNTER — OFFICE VISIT (OUTPATIENT)
Dept: FAMILY MEDICINE CLINIC | Age: 85
End: 2020-11-23
Payer: MEDICARE

## 2020-11-23 VITALS
OXYGEN SATURATION: 96 % | BODY MASS INDEX: 37.14 KG/M2 | HEIGHT: 63 IN | SYSTOLIC BLOOD PRESSURE: 125 MMHG | WEIGHT: 209.6 LBS | HEART RATE: 88 BPM | TEMPERATURE: 97.9 F | DIASTOLIC BLOOD PRESSURE: 80 MMHG

## 2020-11-23 LAB — HBA1C MFR BLD: 6.4 %

## 2020-11-23 PROCEDURE — G8427 DOCREV CUR MEDS BY ELIG CLIN: HCPCS | Performed by: FAMILY MEDICINE

## 2020-11-23 PROCEDURE — G0009 ADMIN PNEUMOCOCCAL VACCINE: HCPCS | Performed by: FAMILY MEDICINE

## 2020-11-23 PROCEDURE — 99214 OFFICE O/P EST MOD 30 MIN: CPT | Performed by: FAMILY MEDICINE

## 2020-11-23 PROCEDURE — 1036F TOBACCO NON-USER: CPT | Performed by: FAMILY MEDICINE

## 2020-11-23 PROCEDURE — G8417 CALC BMI ABV UP PARAM F/U: HCPCS | Performed by: FAMILY MEDICINE

## 2020-11-23 PROCEDURE — 1123F ACP DISCUSS/DSCN MKR DOCD: CPT | Performed by: FAMILY MEDICINE

## 2020-11-23 PROCEDURE — 90670 PCV13 VACCINE IM: CPT | Performed by: FAMILY MEDICINE

## 2020-11-23 PROCEDURE — 4040F PNEUMOC VAC/ADMIN/RCVD: CPT | Performed by: FAMILY MEDICINE

## 2020-11-23 PROCEDURE — 1090F PRES/ABSN URINE INCON ASSESS: CPT | Performed by: FAMILY MEDICINE

## 2020-11-23 PROCEDURE — G8484 FLU IMMUNIZE NO ADMIN: HCPCS | Performed by: FAMILY MEDICINE

## 2020-11-23 PROCEDURE — G8400 PT W/DXA NO RESULTS DOC: HCPCS | Performed by: FAMILY MEDICINE

## 2020-11-23 PROCEDURE — 83036 HEMOGLOBIN GLYCOSYLATED A1C: CPT | Performed by: FAMILY MEDICINE

## 2020-11-23 ASSESSMENT — ENCOUNTER SYMPTOMS
CHEST TIGHTNESS: 0
SHORTNESS OF BREATH: 1
CONSTIPATION: 0
ABDOMINAL PAIN: 0
BLOOD IN STOOL: 0
COUGH: 0

## 2020-11-23 NOTE — PROGRESS NOTES
Subjective:      Patient ID: Jenna Schulte is a 81 y.o. female. HPI  Patient in for checkup on several medical issues. Diabetes-sugars typically below 1 30-1 40 when she checks it at home in the morning. Hypertension-blood pressure 140/80 or below when she checks it at home or elsewhere. Osteoarthritis-pain in multiple joints but does take meloxicam and is able to be more active and constructive. No ill effects from any of her medication. Prior to Visit Medications :  Medication metoprolol succinate (TOPROL XL) 50 MG extended release tablet, Sig TAKE 1 TABLET EVERY DAY, Taking? Yes, Authorizing Provider Gilbert Santos, DO    Medication metFORMIN (GLUCOPHAGE) 500 MG tablet, Sig TAKE 1 TABLET EVERY DAY, Taking? Yes, Authorizing Provider Gilbert Santos, DO    Medication lisinopril-hydroCHLOROthiazide (PRINZIDE;ZESTORETIC) 10-12.5 MG per tablet, Sig TAKE 1 TABLET TWICE DAILY, Taking? Yes, Authorizing Provider Gilbert Santos, DO    Medication ACCU-CHEK DAPHNE PLUS strip, Sig USE TO TEST BLOOD SUGAR ONCE DAILY , Taking? Yes, Authorizing Provider Gilbert Santos, DO    Medication triamcinolone (KENALOG) 0.1 % cream, Sig Apply small amount 2 times daily, Taking? Yes, Authorizing Provider Gilbert Santos, DO    Medication meloxicam (MOBIC) 15 MG tablet, Sig Take 1 tablet by mouth daily, Taking? Yes, Authorizing Provider Gilbert Santos, DO    Medication Multiple Vitamins-Minerals (CENTRUM ADULTS PO), Sig Take by mouth, Taking? Yes, Authorizing Provider Historical Provider, MD    Medication Glucose Blood (BLOOD GLUCOSE TEST STRIPS) STRP, Sig 1 strip by Does not apply route three times daily Patient requesting True Results brand, Taking? Yes, Authorizing Provider Gilbert Santos, DO    Medication glucose monitoring kit (FREESTYLE) monitoring kit, Sig 1 kit by Does not apply route daily as needed (not prn) Dx E11.9-uses once daily-needs Acucheck Daphne Plus, Taking?  Yes, Authorizing Provider Liset Santos, DO    Medication VITAMIN D-3 (COLECALCIFEROL) 400 UNITS TABS, Sig Take  by mouth daily. Indications: OTC, Taking? Yes, Authorizing Provider Historical Provider, MD    Medication aspirin 81 MG EC tablet, Sig Take 81 mg by mouth daily. Indications: OTC, Taking? Yes, Authorizing Provider Historical Provider, MD      Past Medical History:  No date: Hypertension  No date: Mixed hyperlipidemia      Comment:  Hyperlipidemia  No date: Type II or unspecified type diabetes mellitus without   mention of complication, not stated as uncontrolled        Review of Systems    Review of Systems   Constitutional: Negative for fever and unexpected weight change. HENT: Negative for congestion and postnasal drip. Eyes: Negative for visual disturbance. Respiratory: Positive for shortness of breath. Negative for cough and chest tightness. She has a history of asthma. Cardiovascular: Negative for chest pain, palpitations and leg swelling. Gastrointestinal: Negative for abdominal pain, blood in stool and constipation. Genitourinary: Positive for frequency. Negative for dysuria and hematuria. Musculoskeletal: Positive for arthralgias. Negative for myalgias. Skin: Negative for rash. Neurological: Negative for tremors and headaches. Psychiatric/Behavioral: Negative for sleep disturbance. The patient is not nervous/anxious. Objective:   Physical Exam      Physical Exam  Constitutional:       Appearance: Normal appearance. She is well-developed. She is obese. HENT:      Head: Normocephalic. Mouth/Throat:      Mouth: Mucous membranes are moist.      Pharynx: Oropharynx is clear. Eyes:      General: No scleral icterus. Conjunctiva/sclera: Conjunctivae normal.   Neck:      Musculoskeletal: Neck supple. Thyroid: No thyromegaly. Vascular: No carotid bruit. Cardiovascular:      Rate and Rhythm: Normal rate and regular rhythm. Heart sounds: Normal heart sounds.    Pulmonary:      Effort: Pulmonary

## 2021-01-22 ENCOUNTER — IMMUNIZATION (OUTPATIENT)
Dept: PRIMARY CARE CLINIC | Age: 86
End: 2021-01-22
Payer: MEDICARE

## 2021-01-22 PROCEDURE — 0001A COVID-19, PFIZER VACCINE 30MCG/0.3ML DOSE: CPT | Performed by: FAMILY MEDICINE

## 2021-01-22 PROCEDURE — 91300 COVID-19, PFIZER VACCINE 30MCG/0.3ML DOSE: CPT | Performed by: FAMILY MEDICINE

## 2021-02-12 ENCOUNTER — IMMUNIZATION (OUTPATIENT)
Dept: PRIMARY CARE CLINIC | Age: 86
End: 2021-02-12
Payer: MEDICARE

## 2021-02-12 PROCEDURE — 0002A COVID-19, PFIZER VACCINE 30MCG/0.3ML DOSE: CPT | Performed by: FAMILY MEDICINE

## 2021-02-12 PROCEDURE — 91300 COVID-19, PFIZER VACCINE 30MCG/0.3ML DOSE: CPT | Performed by: FAMILY MEDICINE

## 2021-04-05 NOTE — TELEPHONE ENCOUNTER
Last office visit 11/23/2020     Last written 10- x 1 refill    Next office visit scheduled 6/1/2021    Requested Prescriptions     Pending Prescriptions Disp Refills    metFORMIN (GLUCOPHAGE) 500 MG tablet [Pharmacy Med Name: METFORMIN HYDROCHLORIDE 500 MG Tablet] 90 tablet 1     Sig: TAKE 1 TABLET EVERY DAY

## 2021-06-29 ENCOUNTER — VIRTUAL VISIT (OUTPATIENT)
Dept: FAMILY MEDICINE CLINIC | Age: 86
End: 2021-06-29
Payer: MEDICARE

## 2021-06-29 DIAGNOSIS — M15.9 PRIMARY OSTEOARTHRITIS INVOLVING MULTIPLE JOINTS: ICD-10-CM

## 2021-06-29 DIAGNOSIS — I10 ESSENTIAL HYPERTENSION: ICD-10-CM

## 2021-06-29 DIAGNOSIS — E11.9 TYPE 2 DIABETES MELLITUS WITHOUT COMPLICATION, WITHOUT LONG-TERM CURRENT USE OF INSULIN (HCC): Primary | ICD-10-CM

## 2021-06-29 PROCEDURE — 99422 OL DIG E/M SVC 11-20 MIN: CPT | Performed by: FAMILY MEDICINE

## 2021-06-29 ASSESSMENT — PATIENT HEALTH QUESTIONNAIRE - PHQ9
SUM OF ALL RESPONSES TO PHQ9 QUESTIONS 1 & 2: 0
2. FEELING DOWN, DEPRESSED OR HOPELESS: 0
1. LITTLE INTEREST OR PLEASURE IN DOING THINGS: 0
SUM OF ALL RESPONSES TO PHQ QUESTIONS 1-9: 0

## 2021-06-29 ASSESSMENT — ENCOUNTER SYMPTOMS
BLOOD IN STOOL: 0
SHORTNESS OF BREATH: 0
ABDOMINAL PAIN: 0
COUGH: 0

## 2021-06-29 NOTE — PATIENT INSTRUCTIONS
Type 2 diabetes mellitus without complication, without long-term current use of insulin (HCC)  Continue medicationsalways be looking to drop a few pounds by reducing carbohydrates and increase activity as tolerated  Essential hypertension  Continue medications and no added salt dietpreferably no alcoholgood neighbor relative to check blood pressure once a month  Primary osteoarthritis involving multiple joints  Over-the-counter medication as needed and try to keep as active as possible. See me 3 months in the office  This is been a telephone visit with the patient.           Gilbert Santos, DO

## 2021-06-29 NOTE — PROGRESS NOTES
Subjective:      Patient ID: Kerri Gill is a 81 y.o. female. HPI  This was a telephone visit due to the ongoing virus in the community. Patient is at [facility] on the callwe have permission for the call and I am in my office in [facility]. Overall she is doing very well and stays as active as possibleif she does a little too much her breathing gets a little short. Diabetesher last 2 A1c's were 6.4 and we will check that the next time she comes in. Hypertensionning will have a neighbor check her pressure in the next week and call me with the results. Osteoarthritisuses over-the-counter medication if needed. She denies any other issues to discuss. Prior to Visit Medications :  Medication metFORMIN (GLUCOPHAGE) 500 MG tablet, Sig TAKE 1 TABLET EVERY DAY, Taking? Yes, Authorizing Provider Gilbert Santos, DO    Medication metoprolol succinate (TOPROL XL) 50 MG extended release tablet, Sig TAKE 1 TABLET EVERY DAY, Taking? Yes, Authorizing Provider Gilbert Santos, DO    Medication lisinopril-hydroCHLOROthiazide (PRINZIDE;ZESTORETIC) 10-12.5 MG per tablet, Sig TAKE 1 TABLET TWICE DAILY, Taking? Yes, Authorizing Provider Gilbert Santos, DO    Medication ACCU-CHEK JERRY PLUS strip, Sig USE TO TEST BLOOD SUGAR ONCE DAILY , Taking? Yes, Authorizing Provider Gilbert Santos, DO    Medication triamcinolone (KENALOG) 0.1 % cream, Sig Apply small amount 2 times daily, Taking? Yes, Authorizing Provider Gilbert Santos, DO    Medication meloxicam (MOBIC) 15 MG tablet, Sig Take 1 tablet by mouth daily, Taking? Yes, Authorizing Provider Gilbert Santos, DO    Medication Multiple Vitamins-Minerals (CENTRUM ADULTS PO), Sig Take by mouth, Taking? Yes, Authorizing Provider Historical Provider, MD    Medication Glucose Blood (BLOOD GLUCOSE TEST STRIPS) STRP, Sig 1 strip by Does not apply route three times daily Patient requesting True Results brand, Taking?  Yes, Authorizing Provider Brooklyn Santos, DO    Medication glucose without complication, without long-term current use of insulin (HCC)  Continue medicationsalways be looking to drop a few pounds by reducing carbohydrates and increase activity as tolerated  Essential hypertension  Continue medications and no added salt dietpreferably no alcoholgood neighbor relative to check blood pressure once a month  Primary osteoarthritis involving multiple joints  Over-the-counter medication as needed and try to keep as active as possible. See me 3 months in the office  This is been a telephone visit with the patient.           Gilbert Santos, DO

## 2021-07-07 ENCOUNTER — TELEPHONE (OUTPATIENT)
Dept: FAMILY MEDICINE CLINIC | Age: 86
End: 2021-07-07

## 2021-09-10 ENCOUNTER — TELEPHONE (OUTPATIENT)
Dept: FAMILY MEDICINE CLINIC | Age: 86
End: 2021-09-10

## 2021-09-15 DIAGNOSIS — I10 ESSENTIAL HYPERTENSION: ICD-10-CM

## 2021-09-16 RX ORDER — METOPROLOL SUCCINATE 50 MG/1
TABLET, EXTENDED RELEASE ORAL
Qty: 90 TABLET | Refills: 3 | Status: ON HOLD | OUTPATIENT
Start: 2021-09-16 | End: 2022-02-23

## 2021-09-16 RX ORDER — LISINOPRIL AND HYDROCHLOROTHIAZIDE 12.5; 1 MG/1; MG/1
TABLET ORAL
Qty: 180 TABLET | Refills: 3 | Status: SHIPPED | OUTPATIENT
Start: 2021-09-16 | End: 2021-10-13

## 2021-09-16 NOTE — TELEPHONE ENCOUNTER
Refill Request     Last Seen: Last Seen Department: 6/29/2021  Last Seen by PCP: 6/29/2021    Last Written:   Metformin 4/5/2021 for #90 tablet with #1 refill  Metoprolol 10/31/2020 for #90 tablet with #3 refills  Lisinopril- hydr 10/31/2020 for #180 tablet with #3 refills    Next Appointment:   Future Appointments   Date Time Provider Carmencita Jean-Baptiste   10/12/2021 12:00 PM JAMES Hubbard C[facility]ada - [facility]       Future appointment scheduled      Requested Prescriptions     Pending Prescriptions Disp Refills    lisinopril-hydroCHLOROthiazide (PRINZIDE;ZESTORETIC) 10-12.5 MG per tablet [Pharmacy Med Name: LISINOPRIL/HYDROCHLOROTHIAZIDE 10-12.5 MG Tablet] 180 tablet 3     Sig: TAKE 1 TABLET TWICE DAILY    metoprolol succinate (TOPROL XL) 50 MG extended release tablet [Pharmacy Med Name: METOPROLOL SUCCINATE ER 50 MG Tablet Extended Release 24 Hour] 90 tablet 3     Sig: TAKE 1 TABLET EVERY DAY    metFORMIN (GLUCOPHAGE) 500 MG tablet [Pharmacy Med Name: METFORMIN HYDROCHLORIDE 500 MG Tablet] 90 tablet 1     Sig: TAKE 1 TABLET EVERY DAY

## 2021-10-12 ENCOUNTER — OFFICE VISIT (OUTPATIENT)
Dept: FAMILY MEDICINE CLINIC | Age: 86
End: 2021-10-12
Payer: MEDICARE

## 2021-10-12 VITALS
OXYGEN SATURATION: 96 % | SYSTOLIC BLOOD PRESSURE: 126 MMHG | HEIGHT: 63 IN | HEART RATE: 90 BPM | BODY MASS INDEX: 36.68 KG/M2 | DIASTOLIC BLOOD PRESSURE: 86 MMHG | TEMPERATURE: 99.9 F | WEIGHT: 207 LBS

## 2021-10-12 DIAGNOSIS — Z23 NEED FOR INFLUENZA VACCINATION: Primary | ICD-10-CM

## 2021-10-12 DIAGNOSIS — E11.9 TYPE 2 DIABETES MELLITUS WITHOUT COMPLICATION, WITHOUT LONG-TERM CURRENT USE OF INSULIN (HCC): ICD-10-CM

## 2021-10-12 DIAGNOSIS — I10 ESSENTIAL HYPERTENSION: ICD-10-CM

## 2021-10-12 DIAGNOSIS — E78.2 MIXED HYPERLIPIDEMIA: ICD-10-CM

## 2021-10-12 LAB — HBA1C MFR BLD: 6.8 %

## 2021-10-12 PROCEDURE — 4040F PNEUMOC VAC/ADMIN/RCVD: CPT | Performed by: PHYSICIAN ASSISTANT

## 2021-10-12 PROCEDURE — 1090F PRES/ABSN URINE INCON ASSESS: CPT | Performed by: PHYSICIAN ASSISTANT

## 2021-10-12 PROCEDURE — G8484 FLU IMMUNIZE NO ADMIN: HCPCS | Performed by: PHYSICIAN ASSISTANT

## 2021-10-12 PROCEDURE — G8417 CALC BMI ABV UP PARAM F/U: HCPCS | Performed by: PHYSICIAN ASSISTANT

## 2021-10-12 PROCEDURE — 90694 VACC AIIV4 NO PRSRV 0.5ML IM: CPT | Performed by: PHYSICIAN ASSISTANT

## 2021-10-12 PROCEDURE — G0008 ADMIN INFLUENZA VIRUS VAC: HCPCS | Performed by: PHYSICIAN ASSISTANT

## 2021-10-12 PROCEDURE — G8400 PT W/DXA NO RESULTS DOC: HCPCS | Performed by: PHYSICIAN ASSISTANT

## 2021-10-12 PROCEDURE — 83036 HEMOGLOBIN GLYCOSYLATED A1C: CPT | Performed by: PHYSICIAN ASSISTANT

## 2021-10-12 PROCEDURE — 99214 OFFICE O/P EST MOD 30 MIN: CPT | Performed by: PHYSICIAN ASSISTANT

## 2021-10-12 PROCEDURE — 1036F TOBACCO NON-USER: CPT | Performed by: PHYSICIAN ASSISTANT

## 2021-10-12 PROCEDURE — G8427 DOCREV CUR MEDS BY ELIG CLIN: HCPCS | Performed by: PHYSICIAN ASSISTANT

## 2021-10-12 PROCEDURE — 1123F ACP DISCUSS/DSCN MKR DOCD: CPT | Performed by: PHYSICIAN ASSISTANT

## 2021-10-12 SDOH — ECONOMIC STABILITY: TRANSPORTATION INSECURITY
IN THE PAST 12 MONTHS, HAS THE LACK OF TRANSPORTATION KEPT YOU FROM MEDICAL APPOINTMENTS OR FROM GETTING MEDICATIONS?: NO

## 2021-10-12 SDOH — ECONOMIC STABILITY: FOOD INSECURITY: WITHIN THE PAST 12 MONTHS, THE FOOD YOU BOUGHT JUST DIDN'T LAST AND YOU DIDN'T HAVE MONEY TO GET MORE.: NEVER TRUE

## 2021-10-12 SDOH — ECONOMIC STABILITY: FOOD INSECURITY: WITHIN THE PAST 12 MONTHS, YOU WORRIED THAT YOUR FOOD WOULD RUN OUT BEFORE YOU GOT MONEY TO BUY MORE.: NEVER TRUE

## 2021-10-12 SDOH — ECONOMIC STABILITY: INCOME INSECURITY: IN THE LAST 12 MONTHS, WAS THERE A TIME WHEN YOU WERE NOT ABLE TO PAY THE MORTGAGE OR RENT ON TIME?: NO

## 2021-10-12 SDOH — ECONOMIC STABILITY: TRANSPORTATION INSECURITY
IN THE PAST 12 MONTHS, HAS LACK OF TRANSPORTATION KEPT YOU FROM MEETINGS, WORK, OR FROM GETTING THINGS NEEDED FOR DAILY LIVING?: NO

## 2021-10-12 SDOH — ECONOMIC STABILITY: HOUSING INSECURITY
IN THE LAST 12 MONTHS, WAS THERE A TIME WHEN YOU DID NOT HAVE A STEADY PLACE TO SLEEP OR SLEPT IN A SHELTER (INCLUDING NOW)?: NO

## 2021-10-12 ASSESSMENT — ENCOUNTER SYMPTOMS
SHORTNESS OF BREATH: 0
WHEEZING: 0
COUGH: 0

## 2021-10-12 ASSESSMENT — SOCIAL DETERMINANTS OF HEALTH (SDOH): HOW HARD IS IT FOR YOU TO PAY FOR THE VERY BASICS LIKE FOOD, HOUSING, MEDICAL CARE, AND HEATING?: NOT HARD AT ALL

## 2021-10-12 NOTE — PROGRESS NOTES
carotid bruit. Cardiovascular:      Rate and Rhythm: Normal rate and regular rhythm. Heart sounds: Normal heart sounds. Pulmonary:      Effort: Pulmonary effort is normal.      Breath sounds: Normal breath sounds. No wheezing or rhonchi. Musculoskeletal:      Right lower leg: No edema. Left lower leg: No edema. Neurological:      Mental Status: She is alert and oriented to person, place, and time. Cranial Nerves: No cranial nerve deficit. An electronic signature was used to authenticate this note.     --JAMES Rondon

## 2021-10-13 ENCOUNTER — TELEPHONE (OUTPATIENT)
Dept: FAMILY MEDICINE CLINIC | Age: 86
End: 2021-10-13

## 2021-10-13 DIAGNOSIS — E83.52 SERUM CALCIUM ELEVATED: Primary | ICD-10-CM

## 2021-10-13 RX ORDER — SIMVASTATIN 20 MG
20 TABLET ORAL NIGHTLY
Qty: 90 TABLET | Refills: 1 | Status: SHIPPED | OUTPATIENT
Start: 2021-10-13 | End: 2021-10-27 | Stop reason: SDUPTHER

## 2021-10-13 RX ORDER — LISINOPRIL 10 MG/1
10 TABLET ORAL DAILY
Qty: 30 TABLET | Refills: 0 | Status: SHIPPED | OUTPATIENT
Start: 2021-10-13 | End: 2021-11-01 | Stop reason: SDUPTHER

## 2021-10-13 NOTE — TELEPHONE ENCOUNTER
[facility] reporting that they will not be able do run a Calcium Ionized due to not having correct tube.

## 2021-10-27 DIAGNOSIS — E78.2 MIXED HYPERLIPIDEMIA: Primary | ICD-10-CM

## 2021-10-27 RX ORDER — SIMVASTATIN 20 MG
20 TABLET ORAL NIGHTLY
Qty: 90 TABLET | Refills: 1 | Status: ON HOLD | OUTPATIENT
Start: 2021-10-27 | End: 2022-02-23

## 2021-10-27 RX ORDER — SIMVASTATIN 20 MG
20 TABLET ORAL NIGHTLY
Qty: 90 TABLET | Refills: 1 | OUTPATIENT
Start: 2021-10-27

## 2021-11-01 ENCOUNTER — TELEPHONE (OUTPATIENT)
Dept: FAMILY MEDICINE CLINIC | Age: 86
End: 2021-11-01

## 2021-11-01 RX ORDER — LISINOPRIL 10 MG/1
10 TABLET ORAL DAILY
Qty: 90 TABLET | Refills: 1 | Status: ON HOLD | OUTPATIENT
Start: 2021-11-01 | End: 2022-02-23 | Stop reason: ALTCHOICE

## 2021-11-01 NOTE — TELEPHONE ENCOUNTER
Was sent to [facility] and request it be sent to [facility]

## 2021-11-01 NOTE — TELEPHONE ENCOUNTER
Per notes she is to be taking Lisinopril 10 mg daily. Not the Lisinopril/HCTZ combo, Just Lisinopril alone. New prescription was sent per notes. Any questions further please let us know.  Thanks, Phoenix Soler

## 2021-11-01 NOTE — TELEPHONE ENCOUNTER
Pt calling wanting to check with Jyotsna that she still needs to take her lisinopril. Pt stated that she thought that her medication got changed when she saw Tushartorsten Asher last, and wanted to make sure that she still needs to take her lisinopril.  Please Advise

## 2021-11-08 ENCOUNTER — TELEPHONE (OUTPATIENT)
Dept: FAMILY MEDICINE CLINIC | Age: 86
End: 2021-11-08

## 2021-11-08 NOTE — TELEPHONE ENCOUNTER
Pt. Asking for an order for a handicapped placard be renewed and mailed to her. Order needs to have expiration date and patient is asking for that to be the  Maximum of 5 years.

## 2021-11-09 NOTE — TELEPHONE ENCOUNTER
Since this requires signature, please see if one of the partners can help with this.  Thanks, Eugene Crouch

## 2021-11-10 NOTE — TELEPHONE ENCOUNTER
Noted. We would make copy of signed placards and scan to chart. I am sorry, I did not see a signed copy in the patients chart under media.  Thanks, Bud Swann

## 2021-11-15 ENCOUNTER — TELEPHONE (OUTPATIENT)
Dept: FAMILY MEDICINE CLINIC | Age: 86
End: 2021-11-15

## 2021-11-15 NOTE — TELEPHONE ENCOUNTER
I am not sure why this was done. Has she always taking it twice a day? Are her blood pressure stable on twice a day? Was this a cardiology recommendation? I would not mind changing to twice a day but it may not make much more of a difference especially if her blood pressures remain normal on once a day. Dr. Dali Kendrick is not in the office this week.

## 2021-11-15 NOTE — TELEPHONE ENCOUNTER
Pt calling because she was wondering why lisinopril went from her taking two pills to one.  Please Advise

## 2021-11-16 NOTE — TELEPHONE ENCOUNTER
Called patient and informed her. Looks like she was changed from lisinopril/ HCTZ taking BID, to just lisinopril taking daily on her last OV. Patient states that he Daughter is going to come over this Thursday to take her BP. I told patient to call us if it is high. Patient hasn't taken her BP since last OV.

## 2021-11-18 NOTE — TELEPHONE ENCOUNTER
Noted.  That is a low dose of lisinopril so probably her blood pressures would still remain normal on just once a day use.

## 2022-01-06 RX ORDER — BLOOD SUGAR DIAGNOSTIC
STRIP MISCELLANEOUS
Qty: 100 STRIP | Refills: 5 | Status: SHIPPED | OUTPATIENT
Start: 2022-01-06

## 2022-01-06 NOTE — TELEPHONE ENCOUNTER
.  Refill Request     Last Seen: Last Seen Department: 10/12/2021  Last Seen by PCP: 6/29/2021    Last Written: 9/1/2020 100 with 5     Next Appointment:   Future Appointments   Date Time Provider Carmencita Jean-Baptiste   4/12/2022  1:30 PM DO TYLER Argueta [facility] - [place]       Requested Prescriptions     Pending Prescriptions Disp Refills    ACCU-CHEK JERRY PLUS strip [Pharmacy Med Name: Louvella Cousins PLUS   Strip] 100 strip 5     Sig: USE TO TEST BLOOD SUGAR ONCE DAILY

## 2022-02-18 ENCOUNTER — NURSE TRIAGE (OUTPATIENT)
Dept: OTHER | Facility: CLINIC | Age: 87
End: 2022-02-18

## 2022-02-18 ENCOUNTER — OFFICE VISIT (OUTPATIENT)
Dept: FAMILY MEDICINE CLINIC | Age: 87
End: 2022-02-18
Payer: MEDICARE

## 2022-02-18 VITALS — OXYGEN SATURATION: 91 % | SYSTOLIC BLOOD PRESSURE: 114 MMHG | HEART RATE: 92 BPM | DIASTOLIC BLOOD PRESSURE: 78 MMHG

## 2022-02-18 DIAGNOSIS — Z91.89 AT RISK FOR FLUID VOLUME OVERLOAD: Primary | ICD-10-CM

## 2022-02-18 DIAGNOSIS — R06.02 SOB (SHORTNESS OF BREATH): ICD-10-CM

## 2022-02-18 LAB
BASOPHILS ABSOLUTE: 0 K/UL (ref 0–0.2)
BASOPHILS RELATIVE PERCENT: 0.5 %
EOSINOPHILS ABSOLUTE: 0.1 K/UL (ref 0–0.6)
EOSINOPHILS RELATIVE PERCENT: 2 %
HCT VFR BLD CALC: 40.9 % (ref 36–48)
HEMOGLOBIN: 13.2 G/DL (ref 12–16)
LYMPHOCYTES ABSOLUTE: 1.1 K/UL (ref 1–5.1)
LYMPHOCYTES RELATIVE PERCENT: 16.3 %
MCH RBC QN AUTO: 27.1 PG (ref 26–34)
MCHC RBC AUTO-ENTMCNC: 32.4 G/DL (ref 31–36)
MCV RBC AUTO: 83.7 FL (ref 80–100)
MONOCYTES ABSOLUTE: 0.6 K/UL (ref 0–1.3)
MONOCYTES RELATIVE PERCENT: 8 %
NEUTROPHILS ABSOLUTE: 5.2 K/UL (ref 1.7–7.7)
NEUTROPHILS RELATIVE PERCENT: 73.2 %
PDW BLD-RTO: 14.6 % (ref 12.4–15.4)
PLATELET # BLD: 211 K/UL (ref 135–450)
PMV BLD AUTO: 9.5 FL (ref 5–10.5)
RBC # BLD: 4.89 M/UL (ref 4–5.2)
WBC # BLD: 7.1 K/UL (ref 4–11)

## 2022-02-18 PROCEDURE — 36415 COLL VENOUS BLD VENIPUNCTURE: CPT | Performed by: NURSE PRACTITIONER

## 2022-02-18 PROCEDURE — G8484 FLU IMMUNIZE NO ADMIN: HCPCS | Performed by: NURSE PRACTITIONER

## 2022-02-18 PROCEDURE — 1036F TOBACCO NON-USER: CPT | Performed by: NURSE PRACTITIONER

## 2022-02-18 PROCEDURE — 1090F PRES/ABSN URINE INCON ASSESS: CPT | Performed by: NURSE PRACTITIONER

## 2022-02-18 PROCEDURE — 4040F PNEUMOC VAC/ADMIN/RCVD: CPT | Performed by: NURSE PRACTITIONER

## 2022-02-18 PROCEDURE — 1123F ACP DISCUSS/DSCN MKR DOCD: CPT | Performed by: NURSE PRACTITIONER

## 2022-02-18 PROCEDURE — G8427 DOCREV CUR MEDS BY ELIG CLIN: HCPCS | Performed by: NURSE PRACTITIONER

## 2022-02-18 PROCEDURE — 99213 OFFICE O/P EST LOW 20 MIN: CPT | Performed by: NURSE PRACTITIONER

## 2022-02-18 PROCEDURE — G8400 PT W/DXA NO RESULTS DOC: HCPCS | Performed by: NURSE PRACTITIONER

## 2022-02-18 PROCEDURE — G8417 CALC BMI ABV UP PARAM F/U: HCPCS | Performed by: NURSE PRACTITIONER

## 2022-02-18 RX ORDER — FUROSEMIDE 20 MG/1
20 TABLET ORAL EVERY OTHER DAY
Qty: 15 TABLET | Refills: 0 | Status: ON HOLD | OUTPATIENT
Start: 2022-02-18 | End: 2022-03-08 | Stop reason: HOSPADM

## 2022-02-18 NOTE — TELEPHONE ENCOUNTER
Received call from [facility]  at [facility] with [employer] Complaint. Subjective: Caller states \"I am having shortness of breath with walking and laying down at night \"     Current Symptoms:   -denies wheezing, chest pain, pulmonary history or heart history   +shortness of breath with walking and laying down - \"when I am sitting up I don't notice it as much\"   +runny nose- \"I think it is sinus\"  -denies shortness of breath in the past   -denies cough or fever       Onset: 6 weeks ago; worsening    Associated Symptoms: reduced appetite, increased wakefulness, constipation    Pain Severity: Denies     Temperature: Denies     What has been tried: no medications or interventions     LMP: NA Pregnant: NA    Recommended disposition: Go to Office now- advised pt to follow up in [facility] or ED if no appointments available     Care advice provided, patient verbalizes understanding; denies any other questions or concerns; instructed to call back for any new or worsening symptoms. Patient/Caller agrees with recommended disposition; writer provided warm transfer to [facility]  at [facility] for appointment scheduling     Attention Provider: Thank you for allowing me to participate in the care of your patient. The patient was connected to triage in response to information provided to the ECC/PSC. Please do not respond through this encounter as the response is not directed to a shared pool.       Reason for Disposition   MILD difficulty breathing (e.g., minimal/no SOB at rest, SOB with walking, pulse < 100) of new-onset or worse than normal    Protocols used: BREATHING DIFFICULTY-ADULT-OH

## 2022-02-18 NOTE — PROGRESS NOTES
2022  Daisy Anton (: 1935)  81 y.o.    ASSESSMENT and PLAN:  Diane Burleson was seen today for shortness of breath and insomnia. Diagnoses and all orders for this visit:    At risk for fluid volume overload  -     ECHO Complete 2D W Doppler W Color; Future  -     XR CHEST STANDARD (2 VW); Future  -     furosemide (LASIX) 20 MG tablet; Take 1 tablet by mouth every other day  -Likely related to [facility] based on presentation. Last echo 2017.  -Repeat Echo  -Check renal/BNP/CBC  -Start Lasix every other day due to kidey function.   -Recommend reducing daily salt intake to <2g daily, daily weights, and increasing exercise as tolerated.   -Would consider Cardio   SOB (shortness of breath)  -     Cancel: Comprehensive Metabolic Panel; Future  -     Cancel: CBC; Future  -     Cancel: Brain Natriuretic Peptide; Future  -     ECHO Complete 2D W Doppler W Color; Future  -     XR CHEST STANDARD (2 VW); Future  -     furosemide (LASIX) 20 MG tablet; Take 1 tablet by mouth every other day  -     Brain Natriuretic Peptide  -     Comprehensive Metabolic Panel  -     CBC with Auto Differential  -Likely related to [facility] based on presentation. Last echo 2017.  -Repeat Echo  -Check renal/BNP/CBC  -Start Lasix every other day due to kidey function.   -Recommend reducing daily salt intake to <2g daily, daily weights, and increasing exercise as tolerated.   -Would consider Cardio consult if BNP significantly elevated. Return in about 3 months (around 2022), or if symptoms worsen or fail to improve. HPI    Presenting for increase in SOB. Pt has had c/o of increased sob especially with exertion, and laying flat. States increase in sob has been gradual.   Mild-mod edema bilateral lower extremities. Denies hx of COPD. Denies following strict healthy diet protocols, no daily exercise. Denies fevers, body aches, chills. Denies cough, sore throat.         Review of Systems   Constitutional: Negative for activity change, appetite change, fatigue, fever and unexpected weight change. HENT: Negative. Respiratory: Positive for cough and shortness of breath. Negative for chest tightness and wheezing. Cardiovascular: Positive for leg swelling. Negative for chest pain and palpitations. Gastrointestinal: Negative for constipation, diarrhea, nausea and vomiting. Genitourinary: Negative. Negative for difficulty urinating and dysuria. Musculoskeletal: Negative. Negative for gait problem. Neurological: Negative. Negative for dizziness, syncope, weakness, light-headedness, numbness and headaches. Psychiatric/Behavioral: Negative. Allergies, past medical history, family history, and social history reviewed and unchanged from previous encounter. Current Outpatient Medications   Medication Sig Dispense Refill    ACCU-CHEK JERRY PLUS strip USE TO TEST BLOOD SUGAR ONCE DAILY 100 strip 5    Handicap [facility]ac[facility] 11/9/21-11/8/26 1 each 0    lisinopril (PRINIVIL;ZESTRIL) 10 MG tablet Take 1 tablet by mouth daily 90 tablet 1    simvastatin (ZOCOR) 20 MG tablet Take 1 tablet by mouth nightly 90 tablet 1    metoprolol succinate (TOPROL XL) 50 MG extended release tablet TAKE 1 TABLET EVERY DAY 90 tablet 3    metFORMIN (GLUCOPHAGE) 500 MG tablet TAKE 1 TABLET EVERY DAY 90 tablet 1    Multiple Vitamins-Minerals (CENTRUM ADULTS PO) Take by mouth      Glucose Blood (BLOOD GLUCOSE TEST STRIPS) STRP 1 strip by Does not apply route three times daily Patient requesting True Results brand 100 strip 3    glucose monitoring kit (FREESTYLE) monitoring kit 1 kit by Does not apply route daily as needed (not prn) Dx E11.9-uses once daily-needs Acucheck Jerry Plus 1 kit 0    VITAMIN D-3 (COLECALCIFEROL) 400 UNITS TABS Take  by mouth daily. Indications: OTC      aspirin 81 MG EC tablet Take 81 mg by mouth daily. Indications: OTC       No current facility-administered medications for this visit.        Vitals:    02/18/22 1550 BP: 114/78   Site: Right Upper Arm   Position: Sitting   Cuff Size: Large Adult   Pulse: 92   SpO2: 91%     Estimated body mass index is 36.67 kg/m² as calculated from the following:    Height as of 10/12/21: 5' 3\" (1.6 m). Weight as of 10/12/21: 207 lb (93.9 kg). Physical Exam  Vitals reviewed. Constitutional:       Appearance: Normal appearance. She is normal weight. HENT:      Head: Normocephalic and atraumatic. Nose: Nose normal.   Eyes:      Conjunctiva/sclera: Conjunctivae normal.   Cardiovascular:      Rate and Rhythm: Normal rate and regular rhythm. Pulses: Normal pulses. Heart sounds: Normal heart sounds. Pulmonary:      Effort: Pulmonary effort is normal. No respiratory distress. Breath sounds: Normal breath sounds. No wheezing. Chest:      Chest wall: No tenderness. Abdominal:      General: Abdomen is flat. Bowel sounds are normal.      Palpations: Abdomen is soft. Tenderness: There is no abdominal tenderness. There is no guarding. Musculoskeletal:         General: Normal range of motion. Cervical back: Normal range of motion and neck supple. Right lower leg: Edema present. Left lower leg: Edema present. Skin:     General: Skin is warm and dry. Capillary Refill: Capillary refill takes less than 2 seconds. Neurological:      General: No focal deficit present. Mental Status: She is alert and oriented to person, place, and time. Mental status is at baseline. Psychiatric:         Mood and Affect: Mood normal.         Behavior: Behavior normal.         Thought Content:  Thought content normal.         Judgment: Judgment normal.

## 2022-02-19 LAB
A/G RATIO: 1.5 (ref 1.1–2.2)
ALBUMIN SERPL-MCNC: 4 G/DL (ref 3.4–5)
ALP BLD-CCNC: 94 U/L (ref 40–129)
ALT SERPL-CCNC: 17 U/L (ref 10–40)
ANION GAP SERPL CALCULATED.3IONS-SCNC: 17 MMOL/L (ref 3–16)
AST SERPL-CCNC: 25 U/L (ref 15–37)
BILIRUB SERPL-MCNC: <0.2 MG/DL (ref 0–1)
BUN BLDV-MCNC: 16 MG/DL (ref 7–20)
CALCIUM SERPL-MCNC: 10.6 MG/DL (ref 8.3–10.6)
CHLORIDE BLD-SCNC: 103 MMOL/L (ref 99–110)
CO2: 26 MMOL/L (ref 21–32)
CREAT SERPL-MCNC: 1.2 MG/DL (ref 0.6–1.2)
GFR AFRICAN AMERICAN: 52
GFR NON-AFRICAN AMERICAN: 43
GLUCOSE BLD-MCNC: 160 MG/DL (ref 70–99)
POTASSIUM SERPL-SCNC: 4.1 MMOL/L (ref 3.5–5.1)
PRO-BNP: ABNORMAL PG/ML (ref 0–449)
SODIUM BLD-SCNC: 146 MMOL/L (ref 136–145)
TOTAL PROTEIN: 6.6 G/DL (ref 6.4–8.2)

## 2022-02-21 ENCOUNTER — TELEPHONE (OUTPATIENT)
Dept: FAMILY MEDICINE CLINIC | Age: 87
End: 2022-02-21

## 2022-02-21 DIAGNOSIS — E87.70 HYPERVOLEMIA, UNSPECIFIED HYPERVOLEMIA TYPE: ICD-10-CM

## 2022-02-21 DIAGNOSIS — R06.02 SOB (SHORTNESS OF BREATH): Primary | ICD-10-CM

## 2022-02-21 DIAGNOSIS — R79.89 ELEVATED BRAIN NATRIURETIC PEPTIDE (BNP) LEVEL: ICD-10-CM

## 2022-02-21 DIAGNOSIS — N18.9 ACUTE KIDNEY INJURY SUPERIMPOSED ON CHRONIC KIDNEY DISEASE (HCC): ICD-10-CM

## 2022-02-21 DIAGNOSIS — N17.9 ACUTE KIDNEY INJURY SUPERIMPOSED ON CHRONIC KIDNEY DISEASE (HCC): ICD-10-CM

## 2022-02-21 NOTE — TELEPHONE ENCOUNTER
Attempted to call Jeanette back listed on hipaa, no answer. Number listed in note is not listed under [employer], and name of daughter not left. Please see result note on recent labs if patient's daughters call back. If further questions, please let me know.

## 2022-02-22 ASSESSMENT — ENCOUNTER SYMPTOMS
DIARRHEA: 0
CONSTIPATION: 0
CHEST TIGHTNESS: 0
WHEEZING: 0
VOMITING: 0
COUGH: 1
NAUSEA: 0
SHORTNESS OF BREATH: 1

## 2022-02-23 ENCOUNTER — HOSPITAL ENCOUNTER (OUTPATIENT)
Dept: NON INVASIVE DIAGNOSTICS | Age: 87
Discharge: HOME | DRG: 177 | End: 2022-02-23
Payer: MEDICARE

## 2022-02-23 ENCOUNTER — APPOINTMENT (OUTPATIENT)
Dept: CT IMAGING | Age: 87
DRG: 177 | End: 2022-02-23
Payer: MEDICARE

## 2022-02-23 ENCOUNTER — HOSPITAL ENCOUNTER (OUTPATIENT)
Dept: GENERAL RADIOLOGY | Age: 87
DRG: 177 | End: 2022-02-23
Payer: MEDICARE

## 2022-02-23 ENCOUNTER — HOSPITAL ENCOUNTER (OUTPATIENT)
Age: 87
Discharge: HOME | DRG: 177 | End: 2022-02-23
Payer: MEDICARE

## 2022-02-23 ENCOUNTER — APPOINTMENT (OUTPATIENT)
Dept: GENERAL RADIOLOGY | Age: 87
DRG: 177 | End: 2022-02-23
Payer: MEDICARE

## 2022-02-23 ENCOUNTER — HOSPITAL ENCOUNTER (INPATIENT)
Age: 87
LOS: 7 days | Discharge: ANOTHER HOSPITAL | DRG: 177 | End: 2022-03-02
Attending: STUDENT IN AN ORGANIZED HEALTH CARE EDUCATION/TRAINING PROGRAM | Admitting: INTERNAL MEDICINE
Payer: MEDICARE

## 2022-02-23 DIAGNOSIS — R06.02 SOB (SHORTNESS OF BREATH): ICD-10-CM

## 2022-02-23 DIAGNOSIS — I16.1 HYPERTENSIVE EMERGENCY: ICD-10-CM

## 2022-02-23 DIAGNOSIS — U07.1 COVID-19: ICD-10-CM

## 2022-02-23 DIAGNOSIS — J81.0 ACUTE PULMONARY EDEMA (HCC): ICD-10-CM

## 2022-02-23 DIAGNOSIS — J96.02 ACUTE RESPIRATORY FAILURE WITH HYPOXIA AND HYPERCAPNIA (HCC): Primary | ICD-10-CM

## 2022-02-23 DIAGNOSIS — Z91.89 AT RISK FOR FLUID VOLUME OVERLOAD: ICD-10-CM

## 2022-02-23 DIAGNOSIS — J96.01 ACUTE RESPIRATORY FAILURE WITH HYPOXIA AND HYPERCAPNIA (HCC): Primary | ICD-10-CM

## 2022-02-23 LAB
A/G RATIO: 1.4 (ref 1.1–2.2)
ALBUMIN SERPL-MCNC: 4.5 G/DL (ref 3.4–5)
ALP BLD-CCNC: 99 U/L (ref 40–129)
ALT SERPL-CCNC: 22 U/L (ref 10–40)
ANION GAP SERPL CALCULATED.3IONS-SCNC: 9 MMOL/L (ref 3–16)
AST SERPL-CCNC: 29 U/L (ref 15–37)
BASE EXCESS ARTERIAL: -0.2 MMOL/L (ref -3–3)
BASE EXCESS VENOUS: -0.3 MMOL/L (ref -3–3)
BASOPHILS ABSOLUTE: 0.1 K/UL (ref 0–0.2)
BASOPHILS RELATIVE PERCENT: 0.6 %
BILIRUB SERPL-MCNC: 0.3 MG/DL (ref 0–1)
BUN BLDV-MCNC: 17 MG/DL (ref 7–20)
C-REACTIVE PROTEIN: 3.2 MG/L (ref 0–5.1)
CALCIUM SERPL-MCNC: 10.9 MG/DL (ref 8.3–10.6)
CARBOXYHEMOGLOBIN ARTERIAL: 1.1 % (ref 0–1.5)
CARBOXYHEMOGLOBIN: 1.7 % (ref 0–1.5)
CHLORIDE BLD-SCNC: 102 MMOL/L (ref 99–110)
CO2: 31 MMOL/L (ref 21–32)
CREAT SERPL-MCNC: 1.1 MG/DL (ref 0.6–1.2)
D DIMER: 334 NG/ML DDU (ref 0–229)
EKG ATRIAL RATE: 140 BPM
EKG DIAGNOSIS: NORMAL
EKG P AXIS: 65 DEGREES
EKG P-R INTERVAL: 150 MS
EKG Q-T INTERVAL: 324 MS
EKG QRS DURATION: 98 MS
EKG QTC CALCULATION (BAZETT): 494 MS
EKG R AXIS: 81 DEGREES
EKG T AXIS: 67 DEGREES
EKG VENTRICULAR RATE: 140 BPM
EOSINOPHILS ABSOLUTE: 0.1 K/UL (ref 0–0.6)
EOSINOPHILS RELATIVE PERCENT: 1.1 %
GFR AFRICAN AMERICAN: 57
GFR NON-AFRICAN AMERICAN: 47
GLUCOSE BLD-MCNC: 179 MG/DL (ref 70–99)
GLUCOSE BLD-MCNC: 182 MG/DL (ref 70–99)
GLUCOSE BLD-MCNC: 190 MG/DL (ref 70–99)
HCO3 ARTERIAL: 32.4 MMOL/L (ref 21–29)
HCO3 VENOUS: 28.6 MMOL/L (ref 23–29)
HCT VFR BLD CALC: 44.6 % (ref 36–48)
HEMOGLOBIN, ART, EXTENDED: 15.4 G/DL (ref 12–16)
HEMOGLOBIN: 14.1 G/DL (ref 12–16)
INFLUENZA A: NOT DETECTED
INFLUENZA A: NOT DETECTED
INFLUENZA B: NOT DETECTED
INFLUENZA B: NOT DETECTED
LV EF: 35 %
LVEF MODALITY: NORMAL
LYMPHOCYTES ABSOLUTE: 3.5 K/UL (ref 1–5.1)
LYMPHOCYTES RELATIVE PERCENT: 28.7 %
MCH RBC QN AUTO: 26.8 PG (ref 26–34)
MCHC RBC AUTO-ENTMCNC: 31.7 G/DL (ref 31–36)
MCV RBC AUTO: 84.6 FL (ref 80–100)
METHEMOGLOBIN ARTERIAL: 0.2 %
METHEMOGLOBIN VENOUS: 0.1 %
MONOCYTES ABSOLUTE: 1.1 K/UL (ref 0–1.3)
MONOCYTES RELATIVE PERCENT: 8.7 %
NEUTROPHILS ABSOLUTE: 7.4 K/UL (ref 1.7–7.7)
NEUTROPHILS RELATIVE PERCENT: 60.9 %
O2 CONTENT, VEN: 17 VOL %
O2 SAT, ARTERIAL: 99.5 %
O2 SAT, VEN: 86 %
O2 THERAPY: ABNORMAL
O2 THERAPY: ABNORMAL
PCO2 ARTERIAL: 98.5 MMHG (ref 35–45)
PCO2, VEN: 66.2 MMHG (ref 40–50)
PDW BLD-RTO: 15.2 % (ref 12.4–15.4)
PERFORMED ON: ABNORMAL
PERFORMED ON: ABNORMAL
PH ARTERIAL: 7.13 (ref 7.35–7.45)
PH VENOUS: 7.25 (ref 7.35–7.45)
PLATELET # BLD: 379 K/UL (ref 135–450)
PMV BLD AUTO: 9.1 FL (ref 5–10.5)
PO2 ARTERIAL: 308.4 MMHG (ref 75–108)
PO2, VEN: 59.1 MMHG (ref 25–40)
POTASSIUM REFLEX MAGNESIUM: 4.5 MMOL/L (ref 3.5–5.1)
PRO-BNP: ABNORMAL PG/ML (ref 0–449)
PROCALCITONIN: 0.04 NG/ML (ref 0–0.15)
RBC # BLD: 5.27 M/UL (ref 4–5.2)
SARS-COV-2 RNA, RT PCR: DETECTED
SARS-COV-2 RNA, RT PCR: NOT DETECTED
SODIUM BLD-SCNC: 142 MMOL/L (ref 136–145)
TCO2 ARTERIAL: 35.4 MMOL/L
TCO2 CALC VENOUS: 31 MMOL/L
TOTAL PROTEIN: 7.7 G/DL (ref 6.4–8.2)
TROPONIN: <0.01 NG/ML
WBC # BLD: 12.2 K/UL (ref 4–11)

## 2022-02-23 PROCEDURE — 83036 HEMOGLOBIN GLYCOSYLATED A1C: CPT

## 2022-02-23 PROCEDURE — 83880 ASSAY OF NATRIURETIC PEPTIDE: CPT

## 2022-02-23 PROCEDURE — 87636 SARSCOV2 & INF A&B AMP PRB: CPT

## 2022-02-23 PROCEDURE — 2580000003 HC RX 258

## 2022-02-23 PROCEDURE — 6360000004 HC RX CONTRAST MEDICATION: Performed by: STUDENT IN AN ORGANIZED HEALTH CARE EDUCATION/TRAINING PROGRAM

## 2022-02-23 PROCEDURE — 96374 THER/PROPH/DIAG INJ IV PUSH: CPT

## 2022-02-23 PROCEDURE — 94660 CPAP INITIATION&MGMT: CPT

## 2022-02-23 PROCEDURE — 36415 COLL VENOUS BLD VENIPUNCTURE: CPT

## 2022-02-23 PROCEDURE — 6370000000 HC RX 637 (ALT 250 FOR IP)

## 2022-02-23 PROCEDURE — 93010 ELECTROCARDIOGRAM REPORT: CPT | Performed by: INTERNAL MEDICINE

## 2022-02-23 PROCEDURE — 2000000000 HC ICU R&B

## 2022-02-23 PROCEDURE — 85025 COMPLETE CBC W/AUTO DIFF WBC: CPT

## 2022-02-23 PROCEDURE — 71045 X-RAY EXAM CHEST 1 VIEW: CPT

## 2022-02-23 PROCEDURE — 6360000002 HC RX W HCPCS

## 2022-02-23 PROCEDURE — 93005 ELECTROCARDIOGRAM TRACING: CPT | Performed by: STUDENT IN AN ORGANIZED HEALTH CARE EDUCATION/TRAINING PROGRAM

## 2022-02-23 PROCEDURE — 84145 PROCALCITONIN (PCT): CPT

## 2022-02-23 PROCEDURE — 96375 TX/PRO/DX INJ NEW DRUG ADDON: CPT

## 2022-02-23 PROCEDURE — 85379 FIBRIN DEGRADATION QUANT: CPT

## 2022-02-23 PROCEDURE — 86140 C-REACTIVE PROTEIN: CPT

## 2022-02-23 PROCEDURE — 80053 COMPREHEN METABOLIC PANEL: CPT

## 2022-02-23 PROCEDURE — 82803 BLOOD GASES ANY COMBINATION: CPT

## 2022-02-23 PROCEDURE — 6360000002 HC RX W HCPCS: Performed by: NURSE PRACTITIONER

## 2022-02-23 PROCEDURE — 71260 CT THORAX DX C+: CPT

## 2022-02-23 PROCEDURE — 84484 ASSAY OF TROPONIN QUANT: CPT

## 2022-02-23 PROCEDURE — 93306 TTE W/DOPPLER COMPLETE: CPT

## 2022-02-23 PROCEDURE — 99284 EMERGENCY DEPT VISIT MOD MDM: CPT

## 2022-02-23 RX ORDER — ALLOPURINOL 100 MG/1
100 TABLET ORAL DAILY
COMMUNITY
End: 2022-06-06 | Stop reason: SDUPTHER

## 2022-02-23 RX ORDER — IPRATROPIUM BROMIDE AND ALBUTEROL SULFATE 2.5; .5 MG/3ML; MG/3ML
1 SOLUTION RESPIRATORY (INHALATION) ONCE
Status: COMPLETED | OUTPATIENT
Start: 2022-02-23 | End: 2022-02-23

## 2022-02-23 RX ORDER — METOPROLOL SUCCINATE 50 MG/1
1 TABLET, EXTENDED RELEASE ORAL DAILY
Status: DISCONTINUED | OUTPATIENT
Start: 2022-02-23 | End: 2022-02-23

## 2022-02-23 RX ORDER — LISINOPRIL AND HYDROCHLOROTHIAZIDE 12.5; 1 MG/1; MG/1
1 TABLET ORAL DAILY
Status: DISCONTINUED | OUTPATIENT
Start: 2022-02-23 | End: 2022-02-25

## 2022-02-23 RX ORDER — POTASSIUM CHLORIDE 7.45 MG/ML
10 INJECTION INTRAVENOUS PRN
Status: DISCONTINUED | OUTPATIENT
Start: 2022-02-23 | End: 2022-03-02 | Stop reason: HOSPADM

## 2022-02-23 RX ORDER — IPRATROPIUM BROMIDE AND ALBUTEROL SULFATE 2.5; .5 MG/3ML; MG/3ML
SOLUTION RESPIRATORY (INHALATION)
Status: COMPLETED
Start: 2022-02-23 | End: 2022-02-23

## 2022-02-23 RX ORDER — ONDANSETRON 2 MG/ML
4 INJECTION INTRAMUSCULAR; INTRAVENOUS EVERY 6 HOURS PRN
Status: DISCONTINUED | OUTPATIENT
Start: 2022-02-23 | End: 2022-03-02 | Stop reason: HOSPADM

## 2022-02-23 RX ORDER — ASCORBIC ACID 500 MG
500 TABLET ORAL 2 TIMES DAILY
Status: DISCONTINUED | OUTPATIENT
Start: 2022-02-23 | End: 2022-03-02 | Stop reason: HOSPADM

## 2022-02-23 RX ORDER — DEXAMETHASONE SODIUM PHOSPHATE 10 MG/ML
6 INJECTION, SOLUTION INTRAMUSCULAR; INTRAVENOUS EVERY 24 HOURS
Status: DISCONTINUED | OUTPATIENT
Start: 2022-02-24 | End: 2022-02-26

## 2022-02-23 RX ORDER — POLYETHYLENE GLYCOL 3350 17 G/17G
17 POWDER, FOR SOLUTION ORAL DAILY PRN
Status: DISCONTINUED | OUTPATIENT
Start: 2022-02-23 | End: 2022-02-26

## 2022-02-23 RX ORDER — LISINOPRIL AND HYDROCHLOROTHIAZIDE 12.5; 1 MG/1; MG/1
1 TABLET ORAL 2 TIMES DAILY
Status: ON HOLD | COMMUNITY
End: 2022-03-08 | Stop reason: HOSPADM

## 2022-02-23 RX ORDER — AMLODIPINE BESYLATE 5 MG/1
5 TABLET ORAL DAILY
Status: ON HOLD | COMMUNITY
End: 2022-03-08 | Stop reason: HOSPADM

## 2022-02-23 RX ORDER — FUROSEMIDE 10 MG/ML
40 INJECTION INTRAMUSCULAR; INTRAVENOUS ONCE
Status: COMPLETED | OUTPATIENT
Start: 2022-02-23 | End: 2022-02-23

## 2022-02-23 RX ORDER — SODIUM CHLORIDE 0.9 % (FLUSH) 0.9 %
10 SYRINGE (ML) INJECTION PRN
Status: DISCONTINUED | OUTPATIENT
Start: 2022-02-23 | End: 2022-03-02 | Stop reason: HOSPADM

## 2022-02-23 RX ORDER — SODIUM CHLORIDE 0.9 % (FLUSH) 0.9 %
5-40 SYRINGE (ML) INJECTION EVERY 12 HOURS SCHEDULED
Status: DISCONTINUED | OUTPATIENT
Start: 2022-02-23 | End: 2022-03-02 | Stop reason: HOSPADM

## 2022-02-23 RX ORDER — ASPIRIN 81 MG/1
81 TABLET ORAL DAILY
Status: DISCONTINUED | OUTPATIENT
Start: 2022-02-23 | End: 2022-03-02 | Stop reason: HOSPADM

## 2022-02-23 RX ORDER — DEXTROSE MONOHYDRATE 50 MG/ML
100 INJECTION, SOLUTION INTRAVENOUS PRN
Status: DISCONTINUED | OUTPATIENT
Start: 2022-02-23 | End: 2022-03-02 | Stop reason: HOSPADM

## 2022-02-23 RX ORDER — NICOTINE POLACRILEX 4 MG
15 LOZENGE BUCCAL PRN
Status: DISCONTINUED | OUTPATIENT
Start: 2022-02-23 | End: 2022-03-02 | Stop reason: HOSPADM

## 2022-02-23 RX ORDER — LISINOPRIL 10 MG/1
10 TABLET ORAL DAILY
Status: DISCONTINUED | OUTPATIENT
Start: 2022-02-23 | End: 2022-02-23

## 2022-02-23 RX ORDER — SODIUM CHLORIDE 9 MG/ML
25 INJECTION, SOLUTION INTRAVENOUS PRN
Status: DISCONTINUED | OUTPATIENT
Start: 2022-02-23 | End: 2022-03-02 | Stop reason: HOSPADM

## 2022-02-23 RX ORDER — VITAMIN B COMPLEX
2000 TABLET ORAL DAILY
Status: DISCONTINUED | OUTPATIENT
Start: 2022-02-23 | End: 2022-03-02 | Stop reason: HOSPADM

## 2022-02-23 RX ORDER — METHYLPREDNISOLONE SODIUM SUCCINATE 125 MG/2ML
125 INJECTION, POWDER, LYOPHILIZED, FOR SOLUTION INTRAMUSCULAR; INTRAVENOUS DAILY
Status: DISCONTINUED | OUTPATIENT
Start: 2022-02-23 | End: 2022-02-23

## 2022-02-23 RX ORDER — ONDANSETRON 4 MG/1
4 TABLET, ORALLY DISINTEGRATING ORAL EVERY 8 HOURS PRN
Status: DISCONTINUED | OUTPATIENT
Start: 2022-02-23 | End: 2022-03-02 | Stop reason: HOSPADM

## 2022-02-23 RX ORDER — DEXTROSE MONOHYDRATE 25 G/50ML
12.5 INJECTION, SOLUTION INTRAVENOUS PRN
Status: DISCONTINUED | OUTPATIENT
Start: 2022-02-23 | End: 2022-02-23 | Stop reason: CLARIF

## 2022-02-23 RX ORDER — FUROSEMIDE 10 MG/ML
40 INJECTION INTRAMUSCULAR; INTRAVENOUS 2 TIMES DAILY
Status: DISCONTINUED | OUTPATIENT
Start: 2022-02-23 | End: 2022-02-25

## 2022-02-23 RX ORDER — MAGNESIUM SULFATE 1 G/100ML
1000 INJECTION INTRAVENOUS PRN
Status: DISCONTINUED | OUTPATIENT
Start: 2022-02-23 | End: 2022-03-02 | Stop reason: HOSPADM

## 2022-02-23 RX ORDER — POTASSIUM CHLORIDE 750 MG/1
40 TABLET, EXTENDED RELEASE ORAL PRN
Status: DISCONTINUED | OUTPATIENT
Start: 2022-02-23 | End: 2022-03-02 | Stop reason: HOSPADM

## 2022-02-23 RX ADMIN — IOPAMIDOL 85 ML: 755 INJECTION, SOLUTION INTRAVENOUS at 15:37

## 2022-02-23 RX ADMIN — IPRATROPIUM BROMIDE AND ALBUTEROL SULFATE 1 AMPULE: 2.5; .5 SOLUTION RESPIRATORY (INHALATION) at 13:36

## 2022-02-23 RX ADMIN — IPRATROPIUM BROMIDE AND ALBUTEROL SULFATE 1 AMPULE: .5; 2.5 SOLUTION RESPIRATORY (INHALATION) at 13:36

## 2022-02-23 RX ADMIN — NITROGLYCERIN 1 INCH: 20 OINTMENT TOPICAL at 13:49

## 2022-02-23 RX ADMIN — IPRATROPIUM BROMIDE AND ALBUTEROL SULFATE 1 AMPULE: 2.5; .5 SOLUTION RESPIRATORY (INHALATION) at 13:48

## 2022-02-23 RX ADMIN — FUROSEMIDE 40 MG: 10 INJECTION, SOLUTION INTRAMUSCULAR; INTRAVENOUS at 13:50

## 2022-02-23 RX ADMIN — FUROSEMIDE 40 MG: 10 INJECTION, SOLUTION INTRAMUSCULAR; INTRAVENOUS at 18:39

## 2022-02-23 RX ADMIN — SODIUM CHLORIDE, PRESERVATIVE FREE 10 ML: 5 INJECTION INTRAVENOUS at 20:45

## 2022-02-23 RX ADMIN — INSULIN LISPRO 2 UNITS: 100 INJECTION, SOLUTION INTRAVENOUS; SUBCUTANEOUS at 20:48

## 2022-02-23 RX ADMIN — ENOXAPARIN SODIUM 40 MG: 100 INJECTION SUBCUTANEOUS at 20:45

## 2022-02-23 RX ADMIN — METHYLPREDNISOLONE SODIUM SUCCINATE 125 MG: 125 INJECTION, POWDER, FOR SOLUTION INTRAMUSCULAR; INTRAVENOUS at 14:22

## 2022-02-23 RX ADMIN — IPRATROPIUM BROMIDE AND ALBUTEROL SULFATE 1 AMPULE: .5; 2.5 SOLUTION RESPIRATORY (INHALATION) at 13:48

## 2022-02-23 ASSESSMENT — ENCOUNTER SYMPTOMS
RHINORRHEA: 0
NAUSEA: 0
SHORTNESS OF BREATH: 1
VOMITING: 0
ABDOMINAL PAIN: 0
BACK PAIN: 0
DIARRHEA: 0

## 2022-02-23 ASSESSMENT — PAIN SCALES - GENERAL: PAINLEVEL_OUTOF10: 0

## 2022-02-23 NOTE — ED NOTES
Rapid response called, patient in distress during outpatient ECHO. Pt was immediately taken to ED. Upon arrival to ED, pt was in more respiratory distress and c/o worsening SOB. Portabe CXR ordered and completed, EKG completed. Dr. Yi Rangel at bedside.      Blood sugar 750 [address] [place] [address], RN  02/23/22 3899

## 2022-02-23 NOTE — ED NOTES
Raissa sent to Dr. Apolonia Knight at [address]  02/23/22 1620    [facility] Ruddy called back at [address] 02/23/22 [id number]

## 2022-02-23 NOTE — FLOWSHEET NOTE
Rapid Response. Remained present with daughter, Suzan Rendon. Second daughter arrived about 30 minutes after patient arrived in ED. Comforting presence, prayer, support. Daughters were able to help be a peaceful presence for patient as she was struggling to breathe. Robbie Wiggins  [phone]       02/23/22 1650   Encounter Summary   Services provided to: Family; Patient   Referral/Consult From: Multi-disciplinary team   Support System Children;Family members   Place of [address] [place] [address] Visiting   (2/23: rapid, dtr present, 2nd dtr arrived, prayer)   Complexity of Encounter High   Length of Encounter 1 hour;15 minutes   Spiritual Assessment Completed Yes   Crisis   Type Rapid response   Assessment Anxious; Approachable   Intervention Nurtured hope; Active listening;Prayer;Sustaining presence/ Ministry of presence   Outcome Connection/belonging

## 2022-02-23 NOTE — ED NOTES
Bull Lal, RT at bedside to place pt on bipap per order from Dr. Georgina Gomez.       Elizabeth Price RN  02/23/22 2456

## 2022-02-23 NOTE — ED NOTES
Bed: 04  Expected date:   Expected time:   Means of arrival:   Comments:  RAPID RESPONSE     Chang Miranda  02/23/22 1621

## 2022-02-23 NOTE — ED PROVIDER NOTES
[address] ED  EMERGENCY DEPARTMENT ENCOUNTER        Pt Name: Jair French  MRN: [id number]  [place] 1935  Date of evaluation: 2/23/2022  Provider: POOL Bocanegra CNP  PCP: Sierra Negrete DO  Note Started: 2:28 PM EST       I have seen and evaluated this patient with my supervising physician Aurora Wynn MD.      Triage CHIEF COMPLAINT       Chief Complaint   Patient presents with    Shortness of Breath     In outpatient getting an ECHO for progressive SOB. Rapid response was called due to respiratory distress. Study had just started nd          HISTORY OF PRESENT ILLNESS   (Location/Symptom, Timing/Onset, Context/Setting, Quality, Duration, Modifying Factors, Severity)  Note limiting factors. Chief Complaint: Shortness of breath. Jair French is a 81 y.o. female who presents to the emergency department with acute onset of shortness of breath. Apparently the patient has been having intermittent episodes of shortness of breath for the last 4 months. Today she was in the hospital to have an echo performed due to progression of shortness of breath and unfortunately began to have respiratory distress type symptoms. Rapid response was called and the patient was transported to the emergency department. The patient at this time is having severe shortness of breath. She feels as though she cannot take a deep breath. According to family patient has never had symptoms like this before. She has never been in respiratory distress. Never been intubated due to respiratory distress. Denies any recent hospitalizations. Patient has a mild history of hypertension, diabetes, and hyperlipidemia. Denies any cardiac history. Denies any history of COPD. Patient is a former smoker. Nursing Notes were all reviewed and agreed with or any disagreements were addressed in the HPI.     REVIEW OF SYSTEMS    (2-9 systems for level 4, 10 or more for level 5)     Review of Systems Constitutional: Negative for fever. HENT: Negative for congestion and rhinorrhea. Respiratory: Positive for shortness of breath. Cardiovascular: Negative for chest pain. Gastrointestinal: Negative for abdominal pain, diarrhea, nausea and vomiting. Genitourinary: Negative for flank pain. Musculoskeletal: Negative for back pain and neck pain. Skin: Negative for rash. Neurological: Negative for speech difficulty and headaches. Psychiatric/Behavioral: Negative for confusion. PAST MEDICAL HISTORY     Past Medical History:   Diagnosis Date    Hypertension     Mixed hyperlipidemia     Hyperlipidemia    Type II or unspecified type diabetes mellitus without mention of complication, not stated as uncontrolled        SURGICAL HISTORY     Past Surgical History:   Procedure Laterality Date    KNEE SURGERY Right 11/22/2016    LIPOMA RESECTION         CURRENTMEDICATIONS       Previous Medications    ACCU-CHEK JERRY PLUS STRIP    USE TO TEST BLOOD SUGAR ONCE DAILY    ASPIRIN 81 MG EC TABLET    Take 81 mg by mouth daily. Indications: OTC    FUROSEMIDE (LASIX) 20 MG TABLET    Take 1 tablet by mouth every other day    GLUCOSE BLOOD (BLOOD GLUCOSE TEST STRIPS) STRP    1 strip by Does not apply route three times daily Patient requesting True Results brand    GLUCOSE MONITORING KIT (FREESTYLE) MONITORING KIT    1 kit by Does not apply route daily as needed (not prn) Dx E11.9-uses once daily-needs Acucheck Jerry Plus    HANDICAP PLACARD [facility]    11/9/21-11/8/26    LISINOPRIL (PRINIVIL;ZESTRIL) 10 MG TABLET    Take 1 tablet by mouth daily    METFORMIN (GLUCOPHAGE) 500 MG TABLET    TAKE 1 TABLET EVERY DAY    METOPROLOL SUCCINATE (TOPROL XL) 50 MG EXTENDED RELEASE TABLET    TAKE 1 TABLET EVERY DAY    MULTIPLE VITAMINS-MINERALS (CENTRUM ADULTS PO)    Take by mouth    SIMVASTATIN (ZOCOR) 20 MG TABLET    Take 1 tablet by mouth nightly    VITAMIN D-3 (COLECALCIFEROL) 400 UNITS TABS    Take  by mouth daily. Indications: OTC       ALLERGIES     Pcn [penicillins], Statins, and Ibuprofen    FAMILYHISTORY     History reviewed. No pertinent family history. SOCIAL HISTORY       Social History     Socioeconomic History    Marital status:      Spouse name: None    Number of children: None    Years of education: None    Highest education level: None   Occupational History    None   Tobacco Use    Smoking status: Former Smoker     Quit date: 10/24/1983     Years since quittin.3    Smokeless tobacco: Never Used   Substance and Sexual Activity    Alcohol use: No    Drug use: No    Sexual activity: None   Other Topics Concern    None   Social History Narrative    None     Social Determinants of Health     Financial Resource Strain: Low Risk     Difficulty of Paying Living Expenses: Not hard at all   Food Insecurity: No Food Insecurity    Worried About Running Out of Food in the Last Year: Never true    Thelma of Food in the Last Year: Never true   Transportation Needs: No Transportation Needs    Lack of Transportation (Medical): No    Lack of Transportation (Non-Medical):  No   Physical Activity:     Days of Exercise per Week: Not on file    Minutes of Exercise per Session: Not on file   Stress:     Feeling of Stress : Not on file   Social Connections:     Frequency of Communication with Friends and Family: Not on file    Frequency of Social Gatherings with Friends and Family: Not on file    Attends Scientologist Services: Not on file    Active Member of Clubs or Organizations: Not on file    Attends Club or Organization Meetings: Not on file    Marital Status: Not on file   Intimate Partner Violence:     Fear of Current or Ex-Partner: Not on file    Emotionally Abused: Not on file    Physically Abused: Not on file    Sexually Abused: Not on file   Housing Stability: Unknown    Unable to Pay for Housing in the Last Year: No    Number of Places Lived in the Last Year: Not on file    Unstable Housing in the Last Year: No       SCREENINGS             PHYSICAL EXAM    (up to 7 for level 4, 8 or more for level 5)     ED Triage Vitals [02/23/22 1320]   BP Temp Temp src Pulse Resp SpO2 Height Weight   (S) (!) 159/120 -- -- 172 25 98 % -- 207 lb (93.9 kg)       Physical Exam  Vitals and nursing note reviewed. Constitutional:       Appearance: Normal appearance. She is not toxic-appearing or diaphoretic. HENT:      Head: Normocephalic and atraumatic. Nose: Nose normal.   Eyes:      General:         Right eye: No discharge. Left eye: No discharge. Cardiovascular:      Rate and Rhythm: Tachycardia present. Pulmonary:      Effort: Pulmonary effort is normal. No respiratory distress. Breath sounds: Examination of the right-middle field reveals decreased breath sounds. Examination of the left-middle field reveals decreased breath sounds. Examination of the right-lower field reveals decreased breath sounds. Examination of the left-lower field reveals decreased breath sounds. Decreased breath sounds present. No wheezing, rhonchi or rales. Abdominal:      Palpations: Abdomen is soft. Tenderness: There is no guarding or rebound. Musculoskeletal:         General: Normal range of motion. Cervical back: Normal range of motion and neck supple. Right lower leg: No tenderness. No edema. Left lower leg: No tenderness. No edema. Skin:     General: Skin is warm and dry. Neurological:      General: No focal deficit present. Mental Status: She is alert and oriented to person, place, and time. GCS: GCS eye subscore is 4. GCS verbal subscore is 5. GCS motor subscore is 6. Psychiatric:         Mood and Affect: Mood is anxious.          Behavior: Behavior normal.         DIAGNOSTIC RESULTS   LABS:    Labs Reviewed   COVID-19 & INFLUENZA COMBO - Abnormal; Notable for the following components:       Result Value    SARS-CoV-2 RNA, RT PCR DETECTED (*)     All other components within normal limits    Narrative:     Performed at:  [facility] [address],  [place], [place]   Phone [phone]   CBC WITH AUTO DIFFERENTIAL - Abnormal; Notable for the following components:    WBC 12.2 (*)     RBC 5.27 (*)     All other components within normal limits    Narrative:     Performed at:  [place] [address],  [place], [place]   Phone [phone]   COMPREHENSIVE METABOLIC PANEL W/ REFLEX TO MG FOR LOW K - Abnormal; Notable for the following components:    Glucose 190 (*)     GFR Non- 47 (*)     GFR  57 (*)     Calcium 10.9 (*)     All other components within normal limits    Narrative:     Performed at:  [place] [address],  [place]   Phone [phone]   BRAIN NATRIURETIC PEPTIDE - Abnormal; Notable for the following components:    Pro-BNP 13,005 (*)     All other components within normal limits    Narrative:     Performed at:  [facility],  [place]   Phone [phone]   BLOOD GAS, ARTERIAL - Abnormal; Notable for the following components:    pH, Arterial 7.135 (*)     pCO2, Arterial 98.5 (*)     pO2, Arterial 308.4 (*)     HCO3, Arterial 32.4 (*)     All other components within normal limits    Narrative:     CALL  [place]  [facility] tel. [phone],  Chemistry results called to and read back by Karthikeyan Atkins RN, 02/23/2022  13:44, by Jose Manuel Figueredo  Performed at:  [facility] [place],  [place]   Phone [phone]   D-DIMER, QUANTITATIVE - Abnormal; Notable for the following components:    D-Dimer, Quant 334 (*)     All other components within normal limits    Narrative:     Performed at:  [place] [address],  [place]   Phone [phone] BLOOD GAS, VENOUS - Abnormal; Notable for the following components:    pH, Anurag 7.253 (*)     pCO2, Anurag 66.2 (*)     pO2, Anurag 59.1 (*)     Carboxyhemoglobin 1.7 (*)     All other components within normal limits    Narrative:     Performed at:  [facility] [address],  [place], [facility]   Phone [phone] & INFLUENZA COMBO    Narrative:     Performed at:  [facility] [address],  [place], [facility]   Phone [phone]   TROPONIN    Narrative:     Performed at:  [facility]) [facility],  [place], [facility]   Phone [phone]       When ordered, only abnormal lab results are displayed. All other labs were within normal range or not returned as of this dictation. EKG: When ordered, EKG's are interpreted by the Emergency Department Physician in the absence of a cardiologist.  Please see their note for interpretation of EKG. RADIOLOGY:   Non-plain film images such as CT, Ultrasound and MRI are read by the radiologist. Plain radiographic images are visualized andpreliminarily interpreted by the  ED Provider with the below findings:        Interpretation perthe Radiologist below, if available at the time of this note:    CT CHEST PULMONARY EMBOLISM W CONTRAST   Final Result   No evidence of pulmonary embolism. COVID-19 pneumonia as above. XR CHEST PORTABLE   Final Result   Findings most consistent with CHF with vascular congestion and interstitial   edema. XR CHEST PORTABLE    Result Date: 2/23/2022  EXAMINATION: ONE XRAY VIEW OF THE CHEST 2/23/2022 1:19 pm COMPARISON: None. HISTORY: ORDERING SYSTEM PROVIDED HISTORY: SOB, resp distress TECHNOLOGIST PROVIDED HISTORY: Reason for exam:->SOB, resp distress Reason for Exam: SOB, resp distress FINDINGS: The cardiac silhouette is borderline in size. Aortic vascular calcification.  Central vascular congestion. Increased interstitial markings throughout the lungs, most consistent with interstitial pulmonary edema. There is no focal consolidation or significant pleural fluid. Findings most consistent with CHF with vascular congestion and interstitial edema. PROCEDURES   Unless otherwise noted below, none     Procedures    CRITICAL CARE TIME   N/A    CONSULTS:  IP CONSULT TO HOSPITALIST      EMERGENCY DEPARTMENT COURSE and DIFFERENTIAL DIAGNOSIS/MDM:   Vitals:    Vitals:    02/23/22 1447 02/23/22 1544 02/23/22 1621 02/23/22 1633   BP: (!) 125/54 121/72  (!) 161/96   Pulse: 106 109  102   Resp: 20 18 20 23   SpO2: 100% 100%  100%   Weight:           Patient was given thefollowing medications:  Medications   methylPREDNISolone sodium (SOLU-MEDROL) injection 125 mg (125 mg IntraVENous Given 2/23/22 1422)   ipratropium-albuterol (DUONEB) nebulizer solution 1 ampule (1 ampule Inhalation Given 2/23/22 1336)   ipratropium-albuterol (DUONEB) nebulizer solution 1 ampule (1 ampule Inhalation Given 2/23/22 1348)   nitroglycerin (NITRO-BID) 2 % ointment 1 inch (1 inch Topical Given 2/23/22 1349)   furosemide (LASIX) injection 40 mg (40 mg IntraVENous Given 2/23/22 1350)   iopamidol (ISOVUE-370) 76 % injection 85 mL (85 mLs IntraVENous Given 2/23/22 1537)           The hospitalist and they are currently evaluating the patient for admission. Patient initially presented here to the emergency department in acute respiratory failure. We are able to improve her respiratory status with BiPAP, steroids, albuterol, Lasix, and Nitro-Bid ointment. At this time the patient has improved drastically with her respiratory status. Her PCO2 is improving. Patient did test positive for COVID-19 family requested retesting retest was negative. This time her CAT scan of her chest does display some viral pneumonia concerns and this time variable is likely patient does have COVID-19.   We will continue treatment here in the emergency department and further evaluation. FINAL IMPRESSION      1. Acute respiratory failure with hypoxia and hypercapnia [facility])          DISPOSITION/PLAN   DISPOSITION Decision To Admit 02/23/2022 05:20:38 PM      PATIENT REFERREDTO:  No follow-up provider specified.     DISCHARGE MEDICATIONS:  New Prescriptions    No medications on file       DISCONTINUED MEDICATIONS:  Discontinued Medications    No medications on file              (Please note that portions ofthis note were completed with a voice recognition program.  Efforts were made to edit the dictations but occasionally words are mis-transcribed.)    POOL Miller CNP (electronically signed)            POOL Miller CNP  02/23/22 8546

## 2022-02-23 NOTE — PLAN OF CARE
Admit to ICU    Acute hypoxic respiratory failure on BiPAP  COVID 19 +  Fluid volume overload    Garett Sandoval PA-C  2/23/2022 5:30 PM

## 2022-02-24 LAB
ANION GAP SERPL CALCULATED.3IONS-SCNC: 11 MMOL/L (ref 3–16)
BASE EXCESS ARTERIAL: 8.2 MMOL/L (ref -3–3)
BASOPHILS ABSOLUTE: 0 K/UL (ref 0–0.2)
BASOPHILS RELATIVE PERCENT: 0.1 %
BUN BLDV-MCNC: 21 MG/DL (ref 7–20)
C-REACTIVE PROTEIN: 5 MG/L (ref 0–5.1)
CALCIUM SERPL-MCNC: 11.1 MG/DL (ref 8.3–10.6)
CARBOXYHEMOGLOBIN ARTERIAL: 0.4 % (ref 0–1.5)
CHLORIDE BLD-SCNC: 99 MMOL/L (ref 99–110)
CHOLESTEROL, TOTAL: 266 MG/DL (ref 0–199)
CO2: 31 MMOL/L (ref 21–32)
CREAT SERPL-MCNC: 1.3 MG/DL (ref 0.6–1.2)
EOSINOPHILS ABSOLUTE: 0 K/UL (ref 0–0.6)
EOSINOPHILS RELATIVE PERCENT: 0 %
ESTIMATED AVERAGE GLUCOSE: 137 MG/DL
GFR AFRICAN AMERICAN: 47
GFR NON-AFRICAN AMERICAN: 39
GLUCOSE BLD-MCNC: 124 MG/DL (ref 70–99)
GLUCOSE BLD-MCNC: 126 MG/DL (ref 70–99)
GLUCOSE BLD-MCNC: 128 MG/DL (ref 70–99)
GLUCOSE BLD-MCNC: 136 MG/DL (ref 70–99)
GLUCOSE BLD-MCNC: 140 MG/DL (ref 70–99)
GLUCOSE BLD-MCNC: 166 MG/DL (ref 70–99)
GLUCOSE BLD-MCNC: 178 MG/DL (ref 70–99)
HBA1C MFR BLD: 6.4 %
HCO3 ARTERIAL: 33.7 MMOL/L (ref 21–29)
HCT VFR BLD CALC: 40.2 % (ref 36–48)
HDLC SERPL-MCNC: 69 MG/DL (ref 40–60)
HEMOGLOBIN, ART, EXTENDED: 14 G/DL (ref 12–16)
HEMOGLOBIN: 13.3 G/DL (ref 12–16)
LDL CHOLESTEROL CALCULATED: 162 MG/DL
LYMPHOCYTES ABSOLUTE: 0.7 K/UL (ref 1–5.1)
LYMPHOCYTES RELATIVE PERCENT: 8.3 %
MAGNESIUM: 2 MG/DL (ref 1.8–2.4)
MCH RBC QN AUTO: 27.4 PG (ref 26–34)
MCHC RBC AUTO-ENTMCNC: 33.1 G/DL (ref 31–36)
MCV RBC AUTO: 82.7 FL (ref 80–100)
METHEMOGLOBIN ARTERIAL: 0.1 %
MONOCYTES ABSOLUTE: 0.3 K/UL (ref 0–1.3)
MONOCYTES RELATIVE PERCENT: 4 %
NEUTROPHILS ABSOLUTE: 7.1 K/UL (ref 1.7–7.7)
NEUTROPHILS RELATIVE PERCENT: 87.6 %
O2 SAT, ARTERIAL: 90.6 %
O2 THERAPY: ABNORMAL
PCO2 ARTERIAL: 49.9 MMHG (ref 35–45)
PDW BLD-RTO: 14.8 % (ref 12.4–15.4)
PERFORMED ON: ABNORMAL
PH ARTERIAL: 7.45 (ref 7.35–7.45)
PLATELET # BLD: 281 K/UL (ref 135–450)
PMV BLD AUTO: 9.1 FL (ref 5–10.5)
PO2 ARTERIAL: 57.4 MMHG (ref 75–108)
POTASSIUM SERPL-SCNC: 3.9 MMOL/L (ref 3.5–5.1)
RBC # BLD: 4.86 M/UL (ref 4–5.2)
SODIUM BLD-SCNC: 141 MMOL/L (ref 136–145)
TCO2 ARTERIAL: 35.2 MMOL/L
TRIGL SERPL-MCNC: 174 MG/DL (ref 0–150)
VITAMIN D 25-HYDROXY: 34.7 NG/ML
VLDLC SERPL CALC-MCNC: 35 MG/DL
WBC # BLD: 8.1 K/UL (ref 4–11)

## 2022-02-24 PROCEDURE — 6370000000 HC RX 637 (ALT 250 FOR IP)

## 2022-02-24 PROCEDURE — 2580000003 HC RX 258

## 2022-02-24 PROCEDURE — 99223 1ST HOSP IP/OBS HIGH 75: CPT | Performed by: INTERNAL MEDICINE

## 2022-02-24 PROCEDURE — 94761 N-INVAS EAR/PLS OXIMETRY MLT: CPT

## 2022-02-24 PROCEDURE — 6360000002 HC RX W HCPCS

## 2022-02-24 PROCEDURE — 80061 LIPID PANEL: CPT

## 2022-02-24 PROCEDURE — 94660 CPAP INITIATION&MGMT: CPT

## 2022-02-24 PROCEDURE — 2000000000 HC ICU R&B

## 2022-02-24 PROCEDURE — 36415 COLL VENOUS BLD VENIPUNCTURE: CPT

## 2022-02-24 PROCEDURE — 80048 BASIC METABOLIC PNL TOTAL CA: CPT

## 2022-02-24 PROCEDURE — 83735 ASSAY OF MAGNESIUM: CPT

## 2022-02-24 PROCEDURE — 85025 COMPLETE CBC W/AUTO DIFF WBC: CPT

## 2022-02-24 PROCEDURE — 82306 VITAMIN D 25 HYDROXY: CPT

## 2022-02-24 PROCEDURE — 6370000000 HC RX 637 (ALT 250 FOR IP): Performed by: INTERNAL MEDICINE

## 2022-02-24 PROCEDURE — 82803 BLOOD GASES ANY COMBINATION: CPT

## 2022-02-24 PROCEDURE — 2700000000 HC OXYGEN THERAPY PER DAY

## 2022-02-24 PROCEDURE — 86140 C-REACTIVE PROTEIN: CPT

## 2022-02-24 RX ORDER — CARVEDILOL 3.12 MG/1
3.12 TABLET ORAL 2 TIMES DAILY WITH MEALS
Status: DISCONTINUED | OUTPATIENT
Start: 2022-02-24 | End: 2022-02-27

## 2022-02-24 RX ADMIN — ASPIRIN 81 MG: 81 TABLET, COATED ORAL at 08:55

## 2022-02-24 RX ADMIN — CARVEDILOL 3.12 MG: 3.12 TABLET, FILM COATED ORAL at 17:03

## 2022-02-24 RX ADMIN — OXYCODONE HYDROCHLORIDE AND ACETAMINOPHEN 500 MG: 500 TABLET ORAL at 20:07

## 2022-02-24 RX ADMIN — SODIUM CHLORIDE, PRESERVATIVE FREE 10 ML: 5 INJECTION INTRAVENOUS at 08:55

## 2022-02-24 RX ADMIN — OXYCODONE HYDROCHLORIDE AND ACETAMINOPHEN 500 MG: 500 TABLET ORAL at 08:55

## 2022-02-24 RX ADMIN — Medication 2000 UNITS: at 08:55

## 2022-02-24 RX ADMIN — SODIUM CHLORIDE, PRESERVATIVE FREE 10 ML: 5 INJECTION INTRAVENOUS at 20:07

## 2022-02-24 RX ADMIN — ENOXAPARIN SODIUM 40 MG: 100 INJECTION SUBCUTANEOUS at 08:55

## 2022-02-24 RX ADMIN — INSULIN LISPRO 2 UNITS: 100 INJECTION, SOLUTION INTRAVENOUS; SUBCUTANEOUS at 20:07

## 2022-02-24 RX ADMIN — CARVEDILOL 3.12 MG: 3.12 TABLET, FILM COATED ORAL at 11:23

## 2022-02-24 RX ADMIN — DEXAMETHASONE SODIUM PHOSPHATE 6 MG: 10 INJECTION INTRAMUSCULAR; INTRAVENOUS at 08:54

## 2022-02-24 RX ADMIN — INSULIN LISPRO 2 UNITS: 100 INJECTION, SOLUTION INTRAVENOUS; SUBCUTANEOUS at 00:19

## 2022-02-24 RX ADMIN — MUPIROCIN: 20 OINTMENT TOPICAL at 20:07

## 2022-02-24 RX ADMIN — MUPIROCIN: 20 OINTMENT TOPICAL at 12:29

## 2022-02-24 ASSESSMENT — PAIN SCALES - GENERAL
PAINLEVEL_OUTOF10: 0

## 2022-02-24 NOTE — CONSULTS
Patient is being seen at the request of Dr. Addis Ewing for a consultation for CHF and COVID-19 and respiratory failure in a fully vaccinated patient      HISTORY OF PRESENT ILLNESS: This is an 73-year-old female who has had 4-month history of severe worsening shortness of breath and was to get an outpatient echocardiogram on 2/23/2022 but was noted to be tachypneic and hypoxemic when lying flat and so was redirected to the emergency room. She was placed on BiPAP with improvement and admitted with a positive Covid test and acute respiratory failure. No similar prior events. PAST MEDICAL HISTORY:  Past Medical History:   Diagnosis Date    Hypertension     Mixed hyperlipidemia     Hyperlipidemia    Type II or unspecified type diabetes mellitus without mention of complication, not stated as uncontrolled      PAST SURGICAL HISTORY:  Past Surgical History:   Procedure Laterality Date    KNEE SURGERY Right 11/22/2016    LIPOMA RESECTION         FAMILY HISTORY:  No known early lung disease    SOCIAL HISTORY:   reports that she quit smoking about 38 years ago.  She has never used smokeless tobacco.    Scheduled Meds:   aspirin  81 mg Oral Daily    sodium chloride flush  5-40 mL IntraVENous 2 times per day    insulin lispro  0-12 Units SubCUTAneous Q4H    furosemide  40 mg IntraVENous BID    dexamethasone  6 mg IntraVENous Q24H    Vitamin D  2,000 Units Oral Daily    ascorbic acid  500 mg Oral BID    enoxaparin  40 mg SubCUTAneous BID    lisinopril-hydroCHLOROthiazide  1 tablet Oral Daily     Continuous Infusions:   dextrose      sodium chloride       PRN Meds:  glucose, glucagon (rDNA), dextrose, sodium chloride flush, sodium chloride, ondansetron **OR** ondansetron, polyethylene glycol, potassium chloride **OR** potassium alternative oral replacement **OR** potassium chloride, magnesium sulfate, dextrose bolus (hypoglycemia) **OR** dextrose bolus (hypoglycemia)    ALLERGIES:  Patient is allergic to pcn [penicillins], statins, and ibuprofen. REVIEW OF SYSTEMS:  Constitutional: Negative for fever  HENT: Negative for sore throat  Eyes: Negative for redness   Respiratory: + for dyspnea   Cardiovascular: orthopnea   Gastrointestinal: Negative for vomiting, diarrhea   Genitourinary: Negative for hematuria   Musculoskeletal: Negative for arthralgias   Skin: Negative for rash  Neurological: Negative for syncope  Hematological: Negative for adenopathy  Psychiatric/Behavorial: Negative for anxiety    PHYSICAL EXAM:  Blood pressure (!) 147/62, pulse 79, temperature 98.1 °F (36.7 °C), temperature source Oral, resp. rate 17, weight 193 lb 4.8 oz (87.7 kg), SpO2 98 %, not currently breastfeeding.' on 3 L  Gen: No distress. Eyes: PERRL. No sclera icterus. No conjunctival injection. ENT: No discharge. Pharynx clear. Neck: Trachea midline. No obvious mass. Resp: No accessory muscle use. No crackles. No wheezes. No rhonchi. No dullness on percussion. CV: Regular rate. Regular rhythm. No murmur or rub. + edema. GI: Non-tender. Non-distended. No hernia. Skin: Warm and dry. No nodule on exposed extremities. Lymph: No cervical LAD. No supraclavicular LAD. M/S: No cyanosis. No joint deformity. No clubbing. Neuro: Awake. Alert. Moves all four extremities. Psych: Oriented x 3. No anxiety. LABS:  CBC:   Recent Labs     02/23/22  1320 02/24/22  0516   WBC 12.2* 8.1   HGB 14.1 13.3   HCT 44.6 40.2   MCV 84.6 82.7    281     BMP:   Recent Labs     02/23/22  1320 02/24/22  0516    141   K 4.5 3.9    99   CO2 31 31   BUN 17 21*   CREATININE 1.1 1.3*     LIVER PROFILE:   Recent Labs     02/23/22  1320   AST 29   ALT 22   BILITOT 0.3   ALKPHOS 99     PT/INR: No results for input(s): PROTIME, INR in the last 72 hours. APTT: No results for input(s): APTT in the last 72 hours.   UA:No results for input(s): NITRITE, COLORU, PHUR, LABCAST, WBCUA, RBCUA, MUCUS, TRICHOMONAS, YEAST, BACTERIA, CLARITYU, Sammy Tavarez, UROBILINOGEN, BILIRUBINUR, BLOODU, GLUCOSEU, AMORPHOUS in the last 72 hours. Invalid input(s): KETONESU  Recent Labs     02/23/22  1320   PHART 7.135*   ZEJ1CAF 98.5*   PO2ART 308.4*       Micro:  2/23/2022 SARS-CoV-2 positive, influenza negative  2/23/2022 SARS-CoV-2 negative, influenza negative    Imaging:  CTPA 2/23/2022 personally reviewed  Pulmonary Arteries: Pulmonary arteries are adequately opacified for   evaluation.  No evidence of intraluminal filling defect to suggest pulmonary   embolism.  Main pulmonary artery is normal in caliber. Mediastinum: No evidence of mediastinal lymphadenopathy.  The heart and   pericardium demonstrate no acute abnormality.  There is no acute abnormality   of the thoracic aorta. Lungs/pleura: Bilateral ground-glass opacities, right greater than left, with   bilateral lower lobe nodular consolidations, highly suggestive of viral   pneumonia as seen with COVID-19.  Trace bilateral pleural effusions. Upper Abdomen: Limited images of the upper abdomen are unremarkable. Soft Tissues/Bones:  Age related degenerative changes of the visualized   osseous structures without focal destructive lesion.  Visualized soft tissues   are unremarkable.       Impression:       No evidence of pulmonary embolism. COVID-19 pneumonia as above.     -- ground glass is mild, UL predominant with RLL focal consolidation/atelectasis    ECHO 2/23/22   EF 35% (prior ECHO in 2016 with EF 70%)  Global hypokinesis  Elevated LVEDP  SPAP 76    ASSESSMENT:  · COVID-19 infection in a fully vaccinated patient  · Acute hypoxemic and hypercapnic respiratory failure 2/2 CHF  · Acute systolic heart failure  · Probable severe pulmonary hypertension (by Echo)  · Morbid obesity  · Mild JANAE  · DM    PLAN:   COVID-19 isolation, droplet plus   Supplemental oxygen to maintain SaO2 >92%; monitor saturations closely    It is surprising that this patient had 2 identical SARS-CoV-2 test, only 1 of which was positive; CTPA could be c/w mild COVID pneumonia   Dexamethasone 6 mg daily for now   Hold Lasix, ACE, HCTZ and monitor    Add coreg 3.125    Inhaled bronchodilators only as needed, MDI preferred    Lovenox changed to daily   I d/w Dr. Satinder Connell at bedside.    Okay to transfer to PCU

## 2022-02-24 NOTE — PROGRESS NOTES
IM Progress Note    Admit Date:  2/23/2022    Patient admitted with acute hypoxic respiratory failure, COVID-19 pneumonia. vaccinated patient. Required BiPAP overnight    Subjective:  Ms. Ching Pagan is stable today. currently on oxygen 3 L. Shortness of breath is improved. She has new cardiomyopathy and IV Lasix given for possible CHF. Currently she is appears compensated, Lasix has been held    Objective:   /67   Pulse 98   Temp 96.9 °F (36.1 °C) (Oral)   Resp 22   Wt 193 lb 4.8 oz (87.7 kg)   SpO2 91%   Breastfeeding No   BMI 34.24 kg/m²     Intake/Output Summary (Last 24 hours) at 2/24/2022 1309  Last data filed at 2/24/2022 1675  Gross per 24 hour   Intake --   Output 1100 ml   Net -1100 ml         Physical Exam:   Patient seen in droplet plus precautions  General:  Awake, alert, NAD  Skin:  Warm and dry  Neck:  JVD absent. Neck supple  Chest:  Clear to auscultation, respiration easy. No wheezes, rales or rhonchi. Cardiovascular:  RRR ,S1S2 normal  Abdomen:  Soft, non tender, non distended, BS +  Extremities:  No edema. Intact peripheral pulses.  Brisk cap refill, < 2 secs  Neuro: non focal      Medications:   Scheduled Meds:   mupirocin   Nasal BID    carvedilol  3.125 mg Oral BID     [START ON 2/25/2022] enoxaparin  40 mg SubCUTAneous Daily    aspirin  81 mg Oral Daily    sodium chloride flush  5-40 mL IntraVENous 2 times per day    insulin lispro  0-12 Units SubCUTAneous Q4H    [Held by provider] furosemide  40 mg IntraVENous BID    dexamethasone  6 mg IntraVENous Q24H    Vitamin D  2,000 Units Oral Daily    ascorbic acid  500 mg Oral BID    [Held by provider] lisinopril-hydroCHLOROthiazide  1 tablet Oral Daily       Continuous Infusions:   dextrose      sodium chloride         Data:  CBC:   Recent Labs     02/23/22  1320 02/24/22  0516   WBC 12.2* 8.1   RBC 5.27* 4.86   HGB 14.1 13.3   HCT 44.6 40.2   MCV 84.6 82.7   RDW 15.2 14.8    281     BMP:   Recent Labs 02/23/22  1320 02/24/22  0516    141   K 4.5 3.9    99   CO2 31 31   BUN 17 21*   CREATININE 1.1 1.3*     BNP: No results for input(s): BNP in the last 72 hours. PT/INR: No results for input(s): PROTIME, INR in the last 72 hours. APTT: No results for input(s): APTT in the last 72 hours. CARDIAC ENZYMES:   Recent Labs     02/23/22  1320   TROPONINI <0.01     FASTING LIPID PANEL:  Lab Results   Component Value Date    CHOL 277 (H) 10/12/2021    HDL 52 10/12/2021    TRIG 295 (H) 10/12/2021     LIVER PROFILE:   Recent Labs     02/23/22  1320   AST 29   ALT 22   BILITOT 0.3   ALKPHOS 99      Cultures  COVID PCR done twice. One is positive and one is negative. Influenza A and B not detected      Radiology  CT CHEST PULMONARY EMBOLISM W CONTRAST   Final Result   No evidence of pulmonary embolism. COVID-19 pneumonia as above. XR CHEST PORTABLE   Final Result   Findings most consistent with CHF with vascular congestion and interstitial   edema. ECHO      Summary   LV systolic function is reduced with ejection fraction estimated at 35%. There is mild global hypokinesis. There is mild concentric left ventricular hypertrophy. Elevated left ventricular diastolic filling pressure. The right atrium is mildly dilated. Mild posterior mitral annular calcification is present. Aortic valve appears sclerotic but opens adequately. Moderate mitral regurgitation. Mild aortic, tricuspid, and pulmonic regurgitation is present. Systolic pulmonary artery pressure (SPAP) estimated at 76 mmHg (RA pressure   8 mmHg), consistent with severe pulmonary hypertension. There is a trivial circumferential pericardial effusion noted.    11/21/2016 EF greater than 70%Dynamic outflow tract obstruction, LVH, DD1,   posterior MAC       Assessment:  Principal Problem:    Acute respiratory failure with hypoxia (HCC)  Active Problems:    Cardiomyopathy (HCC)    SOB (shortness of breath) COVID-19  Resolved Problems:    * No resolved hospital problems. *      Plan:    # Acute respiratory failure with hypoxia and hypercapnia  -Secondary to COVID-19 pneumonia, CHF  -Admitted to ICU, s/p BiPAP. She has improved now. Oxygen saturation stable on 3 L. Can transfer  to PCU      #COVID-19 pneumonia  -Vaccinated patient  -Covid testing done twice. One was positive and one was negative. Both were PCR studies   - CT chest did show changes consistent with Covid pneumonia, no PE.  -Oxygen saturation stable keep on droplet plus precautions for now  -Continue Decadron  -Inhaled bronchodilators    #Acute systolic CHF  #New cardiomyopathy  #Severe pulmonary hypertension  -Patient with shortness of breath for several months, she had outpatient echocardiogram done yesterday-> sent to the ER from echo department for increased shortness of breath.  -Echo shows decreased ejection fraction 35% with pulmonary hypertension.  -Cardiology consulted  -Patient got IV Lasix yesterday, creatinine trending up today. Further doses of Lasix held. Hold ACE inhibitor's. Coreg added    #Obesity    # DM-2  -On sliding scale insulin  - repeat hemoglobin A1c    # Hypercalcemia  - monitor with diuresis          DVT Prophylaxis: Lovenox  ADULT DIET;  Regular; Low Fat/Low Chol/High Fiber/2 gm Na  Code Status: Full Code    Transfer to PCU      Emeka Oliveros MD, MD 2/24/2022 1:09 PM

## 2022-02-24 NOTE — H&P
Hospital Medicine History & Physical      PCP: Ervin Grant DO    Date of Admission: 2/23/2022    Date of Service: Pt seen/examined on 2/23/2022    Chief Complaint: Shortness of breath    History Of Present Illness:    81 y.o. female who presented to the hospital with a chief complaint of shortness of breath. Very pleasant 70-year-old female who was get an outpatient echocardiogram obtained for worsening dyspnea on exertion and at rest.  During her procedure she developed shortness of breath and difficulty breathing. A rapid response was called and she was taken to the emergency department for evaluation. In the emergency department she was tachypneic, hypoxic and acidotic. She was placed on BiPAP and was admitted to the ICU for further care. An echocardiogram obtained revealed new onset systolic dysfunction which was not there in prior echocardiograms with severe pulmonary hypertension. She was also positive for COVID-19. Of note she has been fully vaccinated with a booster. When I saw the patient she was awake, alert and not on BiPAP therapy. She will be observed in the ICU overnight. Past Medical History:        Diagnosis Date    Hypertension     Mixed hyperlipidemia     Hyperlipidemia    Type II or unspecified type diabetes mellitus without mention of complication, not stated as uncontrolled        Past Surgical History:          Procedure Laterality Date    KNEE SURGERY Right 11/22/2016    LIPOMA RESECTION         Medications Prior to Admission:      Prior to Admission medications    Medication Sig Start Date End Date Taking?  Authorizing Provider   amLODIPine (NORVASC) 5 MG tablet Take 5 mg by mouth daily   Yes Historical Provider, MD   lisinopril-hydroCHLOROthiazide (PRINZIDE;ZESTORETIC) 10-12.5 MG per tablet Take 1 tablet by mouth 2 times daily   Yes Historical Provider, MD   allopurinol (ZYLOPRIM) 100 MG tablet Take 100 mg by mouth daily   Yes Historical Provider, MD   furosemide (LASIX) 20 MG tablet Take 1 tablet by mouth every other day 2/18/22   Juarez POOL Vega - CNP   ACCU-CHEK DAPHNE PLUS strip USE TO TEST BLOOD SUGAR ONCE DAILY 1/6/22   Geraldine GaliciaPOOL Fine - CNP   Handicap Placard [facility] 11/9/21-11/8/26 11/9/21   Edsno Palomino MD   metFORMIN (GLUCOPHAGE) 500 MG tablet TAKE 1 TABLET EVERY DAY 9/16/21   POOL Guaman - CNP   Multiple Vitamins-Minerals (CENTRUM ADULTS PO) Take by mouth    Historical Provider, MD   Glucose Blood (BLOOD GLUCOSE TEST STRIPS) STRP 1 strip by Does not apply route three times daily Patient requesting True Results brand 5/3/18   Gilbert Santos DO   glucose monitoring kit (FREESTYLE) monitoring kit 1 kit by Does not apply route daily as needed (not prn) Dx E11.9-uses once daily-needs Acucheck Daphne Plus 9/7/16   Gilbert Santos DO   aspirin 81 MG EC tablet Take 81 mg by mouth daily. Indications: OTC    Historical Provider, MD       Allergies:  Pcn [penicillins], Statins, and Ibuprofen    Social History:      TOBACCO:   reports that she quit smoking about 38 years ago. She has never used smokeless tobacco.  ETOH:   reports no history of alcohol use. Family History:    History reviewed. No pertinent family history. REVIEW OF SYSTEMS:   Constitutional:  Negative for fever,chills or night sweats  ENT:  Negative for rhinorrhea, epistaxis, hoarseness, sore throat. Respiratory: Positive for shortness of breath  Cardiovascular:   Negative for  chest pain, palpitations   Gastrointestinal:  Negative for nausea, vomiting, diarrhea  Genitourinary:  Negative for polyuria, dysuria   Hematologic/Lymphatic:  Negative for  bleeding tendency, easy bruising  Musculoskeletal:  Negative for myalgias and arthralgias  Neurologic:  Negative for  confusion,dysarthria. Skin:  Negative for itching,rash  Psychiatric:  Negative for depression,anxiety, agitation. Endocrine:  Negative for polydipsia,polyuria,heat /cold intolerance.     PHYSICAL EXAM:  BP (!) 135/94   Pulse 102   Resp 25   Wt 207 lb (93.9 kg)   SpO2 98%   Breastfeeding No   BMI 36.67 kg/m²   General appearance:  No apparent distress, appears stated age and cooperative. HEENT:  Normal cephalic, atraumatic without obvious deformity. Pupils equal, round, and reactive to light. Extra ocular muscles intact. Conjunctivae/corneas clear. Neck: Supple, with full range of motion. No jugular venous distention. Trachea midline. Respiratory: Diminished breath sounds bilaterally  Cardiovascular: Tachycardic  Abdomen: Soft, non-tender, non-distended with normal bowel sounds. Musculoskeletal:  No clubbing, cyanosis or edema bilaterally. Full range of motion without deformity. Skin: Skin color, texture, turgor normal.  No rashes or lesions. Neurologic:  Neurovascularly intact without any focal sensory/motor deficits. Cranial nerves: II-XII intact, grossly non-focal.  Psychiatric:  Alert and oriented, thought content appropriate, normal insight  Capillary Refill: Brisk,< 3 seconds   Peripheral Pulses: +2 palpable, equal bilaterally       Labs:   Recent Labs     02/23/22  1320   WBC 12.2*   HGB 14.1   HCT 44.6        Recent Labs     02/23/22  1320      K 4.5      CO2 31   BUN 17   CREATININE 1.1   CALCIUM 10.9*     Recent Labs     02/23/22  1320   AST 29   ALT 22   BILITOT 0.3   ALKPHOS 99     No results for input(s): INR in the last 72 hours. Recent Labs     02/23/22  1320   TROPONINI <0.01       Urinalysis:    No results found for: [place], BACTERIA, RBCUA, BLOODU, [address], GLUCOSEU    Radiology:   EKG:  I have reviewed the EKG with the following interpretation: Sinus tachycardia, non specific ST and T wave changes     CT CHEST PULMONARY EMBOLISM W CONTRAST   Final Result   No evidence of pulmonary embolism. COVID-19 pneumonia as above. XR CHEST PORTABLE   Final Result   Findings most consistent with CHF with vascular congestion and interstitial   edema. ASSESSMENT:  Acute respiratory failure with hypercapnia  Acute systolic heart failure  Cardiomyopathy, unclear if ischemic  Severe pulmonary hypertension  COVID-19 pneumonia  Diabetes mellitus type 2  Morbid obesity    PLAN:  Continue intermittent BiPAP use if needed, when I saw the patient she appeared very comfortable and she was on room air. Her symptoms are likely all related to her systolic heart failure more so than COVID-19. She is fully vaccinated for Covid and says she actually got the booster shot as well.  -Continue Lasix twice daily, cardiology consulted. Will need goal-directed management of her CHF. She does have pulmonary hypertension as well. -Was also started on dexamethasone by primary team, this may be discontinued depending on her symptoms as she is no longer hypoxic.  -Sliding scale insulin, repeat hemoglobin A1c  -Reevaluate in the a.m. DVT Prophylaxis: Lovenox  Diet: Diet NPO  Code Status: Full Code    Dispo -admit to the ICU      Thank you for the opportunity to be involved in this patient's care.       (Please note that portions of this note were completed with a voice recognition program. Efforts were made to edit the dictations but occasionally words are mis-transcribed.)

## 2022-02-24 NOTE — PROGRESS NOTES
Patient just brought over from ED. Patient settled in bed. VSS. Patient O2 sat is 98% on RA- leaving on RA to give her a break from [facility].

## 2022-02-24 NOTE — PROGRESS NOTES
Blood pressure (!) 147/62, pulse 79, temperature 98.1 °F (36.7 °C), temperature source Oral, resp. rate 17, weight 193 lb 4.8 oz (87.7 kg), SpO2 98 %, not currently breastfeeding. Patient slept through the night. No complaints of pain or discomfort voiced. Patient was on room air this shift with no de-sating noted. No other changes this shift.     Electronically signed by Marylu Turpin RN on 2/24/2022 at 6:29 AM

## 2022-02-24 NOTE — ACP (ADVANCE CARE PLANNING)
Pt declined the conversation and did not want referral to spiritual care for advance directives.  RN aware

## 2022-02-24 NOTE — PROGRESS NOTES
Blood pressure (!) 135/94, pulse 102, resp. rate 25, weight 207 lb (93.9 kg), SpO2 98 %, not currently breastfeeding. Patient A/O x 4. Patient wears bilateral hearing aids and continues to be very hard of hearing. Patient states she feels over all weak, but otherwise feels like her normal self. Lung sounds diminished throughout lobes. Abdomen rounded, BS + x 4 quads. No edema noted. Patient states she took all her home medications today before she came to the hospital so daily home medications held this shift. Blood sugar monitored with insulin given per SS. Will continue to monitor per order. RT to put Bi-Pap mask back on when she is ready to go to sleep. Patient is oriented to Call light, tv, and lights in the room, along with staff. Patient will use call light in advance of needs and wait for staff prior to getting out of bed. Shift assessment complete. See doc flow. Nightly medications given see MAR. Patient with no complaints at this time. Call light and bedside table within easy reach.      Electronically signed by Burt Corbin RN on 2/23/2022 at 8:26 PM

## 2022-02-24 NOTE — PROGRESS NOTES
4 Eyes Skin Assessment     The patient is being assess for   Admission    I agree that 2 RN's have performed a thorough Head to Toe Skin Assessment on the patient. ALL assessment sites listed below have been assessed. Areas assessed for pressure by both nurses:   [x]   Head, Face, and Ears   [x]   Shoulders, Back, and Chest, Abdomen  [x]   Arms, Elbows, and Hands   [x]   Coccyx, Sacrum, and Ischium  [x]   Legs, Feet, and Heels  Scratches on LLE, scattered bruising      Skin Assessed Under all Medical Devices by both nurses:  NA              All Mepilex Borders were peeled back and area peeked at by both nurses:  No: No mepilex in place   Please list where Mepilex Borders are located:               **SHARE this note so that the co-signing nurse is able to place an eSignature**    Co-signer eSignature:   Electronically signed by Gale Richards RN on 2/23/22 at 8:54 PM EST    Does the Patient have Skin Breakdown related to pressure? No     (Insert Photo here)         Javier Prevention initiated:  No   Wound Care Orders initiated:  No      [facility] nurse consulted for Pressure Injury (Stage 3,4, Unstageable, DTI, NWPT, Complex wounds)and New or Established Ostomies:  No      Patient is not able to demonstrate the ability to move from a reclining position to an upright position within the recliner due to weak.   Primary Nurse eSignature: Electronically signed by Patric Mello RN on 2/23/22 at 8:53 PM EST

## 2022-02-24 NOTE — CARE COORDINATION
Case Management Assessment  Initial Evaluation      Patient Name: Caleb Ford  YOB: 1935  Diagnosis: Acute respiratory failure with hypoxia ([facility] [address] [J96.01]  Acute respiratory failure with hypoxia and hypercapnia ([facility] [address] [J96.01, J96.02]  Date / Time: 2/23/2022  1:09 PM    Admission status/Date: 02/23/2022 Inpatient   Chart Reviewed: Yes      Patient Pasquale Ojeda: Yes -via bedside phone  Family Interviewed:  Lorenzo Junior left for pt's son Nishi Love requesting a call back as pt did not answer phone on first attempt      Hospitalization in the last 30 days:  No    Health Care Decision Maker :  Pt declined to address and declined consult to spiritual care    Met with: pt   Interview conducted  (bedside/phone): phone call to bedside phone    Current PCP: Wendy Devlin DO    Financial  humana Medicare  Precert required for SNF : Y          3 night stay required -  N    ADLS  Support Systems/Care Needs: Children  Transportation: self    Meal Preparation: self    Housing  Living Arrangements: pt lives at home alone  Steps: 2 small   Intent for return to present living arrangements: Yes  Identified Issues: [address] [facility] with [address] : No Agency:(Services)  Type of Home Care Services: None  Passport/Waiver : No  :                      Phone Number:    Passport/Waiver Services: n/a          Durable Medical Equiptment   DME Provider: n/a  Equipment:   Walker___Cane___RTS___ BSC___Shower Chair___Hospital Bed___W/C____Other________  02 at ____Liter(s)---wears(frequency)_______ HHN ___ CPAP___ BiPap___   N/A__x__      Home O2 Use :  No    If No for home O2---if presently on O2 during hospitalization:  Yes  if yes CM to follow for potential DC O2 need  Informed of need for care provider to bring portable home O2 tank on day of discharge for nursing to connect prior to leaving:   Not Indicated  Verbalized agreement/Understanding:   Not Indicated    Community Service Affiliation  Dialysis:  No    · Agency:  · Location:  · Dialysis Schedule:  · Phone:   · Fax: Other Community Services: n/a    DISCHARGE PLAN: Explained Case Management role/services. Chart review completed. Called and spoke with pt via bedside phone. Pt states she is independent at home and plans on returning home. She is aware writer left a message for her son Giuliana Weston. She denied needs or questions for CM     Pt's RN stated pt is vaccinated and got the booster. CM will follow.  Please notify CM if needs or concerns arise

## 2022-02-24 NOTE — CONSULTS
[facility]  [phone]        Reason for Consultation/Chief Complaint: \"I have been having shortness of breath. \"  Consulted for exCHF   No known cardiac care    History of Present Illness:  Caleb Ford is a 81 y.o. patient who [facility] with complaints of SOB. She was in outpatient getting an ECHO for progressive SOB. Rapid response was called. She has PMH significant for HTN, HLD, and DM. No known cardiac history. Note Echo 11/21/2016 EF=>70%; mild cLVH; grade I DD. Presented to ED in acute respiratory failure. Reports 6 months of SOB mainly with exertion and recently c/o SOB with laying flat and waking up from sleep SOB. Symptoms improved with BiPap, steroids, albuterol, Lasix 40mg IV x 1, and Nitro-Bid ointment. She did test positive for COVID-19. Note EKG ST 140bpm; baseline artifact; nonspecific ST and T wave abnormality. Most recent Echo 2/23/2022 EF=35%; mild global HK; mild cLVH; RA mildly dilated; AV sclerosis;; mild posterior MAC; mod MR; mild AR, TR and WA; severe pulmonary HTN (SPAP) estimated at 76 mmHg; BUN 21.  Cr 1.3. Calcium 11.1. BNP 13,005. Troponin <0.01. Note CXR consistent with CHF. Note Chest CT highly suggestive of COVID-19 pneumonia. Admitted to ICU on BiPAP and now on NC 3L. Patient with no complaints of chest pain, palpitations, dizziness, edema, or orthopnea/PND. I have been asked to provide consultation regarding further management and testing. Past Medical History:   has a past medical history of Hypertension, Mixed hyperlipidemia, and Type II or unspecified type diabetes mellitus without mention of complication, not stated as uncontrolled. Surgical History:   has a past surgical history that includes lipoma resection and knee surgery (Right, 11/22/2016). Social History:   reports that she quit smoking about 38 years ago.  She has never used smokeless tobacco. She reports that she does not drink alcohol and does not use drugs. Family History:  family history is negative for significant heart disease in parents     Home Medications:  Were reviewed and are listed in nursing record. and/or listed below  Prior to Admission medications    Medication Sig Start Date End Date Taking? Authorizing Provider   amLODIPine (NORVASC) 5 MG tablet Take 5 mg by mouth daily   Yes Historical Provider, MD   lisinopril-hydroCHLOROthiazide (PRINZIDE;ZESTORETIC) 10-12.5 MG per tablet Take 1 tablet by mouth 2 times daily   Yes Historical Provider, MD   allopurinol (ZYLOPRIM) 100 MG tablet Take 100 mg by mouth daily   Yes Historical Provider, MD   furosemide (LASIX) 20 MG tablet Take 1 tablet by mouth every other day 2/18/22   Herminia Morgan, APRN - CNP   ACCU-CHEK DAPHNE PLUS strip USE TO TEST BLOOD SUGAR ONCE DAILY 1/6/22   Sari Bradley APRN - [facility]   Handicap Placard [facility] 11/9/21-11/8/26 11/9/21   Viviana Garcia MD   metFORMIN (GLUCOPHAGE) 500 MG tablet TAKE 1 TABLET EVERY DAY 9/16/21   Nikolai Jaimes APRN - CNP   Multiple Vitamins-Minerals (CENTRUM ADULTS PO) Take by mouth    Historical Provider, MD   Glucose Blood (BLOOD GLUCOSE TEST STRIPS) STRP 1 strip by Does not apply route three times daily Patient requesting True Results brand 5/3/18   Gilbert Santos DO   glucose monitoring kit (FREESTYLE) monitoring kit 1 kit by Does not apply route daily as needed (not prn) Dx E11.9-uses once daily-needs Acucheck Daphne Plus 9/7/16   Gilbert Santos DO   aspirin 81 MG EC tablet Take 81 mg by mouth daily.     Indications: OTC    Historical Provider, MD        Allergies:  Pcn [penicillins], Statins, and Ibuprofen     Review of Systems:   12 point ROS negative in all areas as listed below except as in [place]  Constitutional, EENT, Cardiovascular, pulmonary, GI, , Musculoskeletal, skin, neurological, hematological, endocrine, Psychiatric    Physical Examination:    Vitals:    02/24/22 0600   BP: (!) 147/62   Pulse: 79   Resp: 17   Temp: SpO2: 98%    Weight: 193 lb 4.8 oz (87.7 kg)         General Appearance:  Alert, cooperative, no distress, appears stated age   Head:  Normocephalic, without obvious abnormality, atraumatic   Eyes:  PERRL, conjunctiva/corneas clear       Nose: Nares normal, no drainage or sinus tenderness   Throat: Lips, mucosa, and tongue normal   Neck: Supple, symmetrical, trachea midline, no adenopathy, thyroid: not enlarged, symmetric, no tenderness/mass/nodules, no carotid bruit or JVD       Lungs:   Clear to auscultation bilaterally, respirations unlabored   Chest Wall:  No tenderness or deformity   Heart:  Regular rate and rhythm, S1, S2 normal, no murmur, rub or gallop   Abdomen:   Soft, non-tender, bowel sounds active all four quadrants,  no masses, no organomegaly           Extremities: Extremities normal, atraumatic, no cyanosis or edema   Pulses: 2+ and symmetric   Skin: Skin color, texture, turgor normal, no rashes or lesions   Pysch: Normal mood and affect   Neurologic: Normal gross motor and sensory exam.         Labs  CBC:   Lab Results   Component Value Date    WBC 8.1 02/24/2022    RBC 4.86 02/24/2022    HGB 13.3 02/24/2022    HCT 40.2 02/24/2022    MCV 82.7 02/24/2022    RDW 14.8 02/24/2022     02/24/2022     CMP:    Lab Results   Component Value Date     02/24/2022    K 3.9 02/24/2022    K 4.5 02/23/2022    CL 99 02/24/2022    CO2 31 02/24/2022    BUN 21 02/24/2022    CREATININE 1.3 02/24/2022    GFRAA 47 02/24/2022    GFRAA >60 05/08/2013    AGRATIO 1.4 02/23/2022    LABGLOM 39 02/24/2022    GLUCOSE 140 02/24/2022    PROT 7.7 02/23/2022    PROT 7.2 05/03/2012    CALCIUM 11.1 02/24/2022    BILITOT 0.3 02/23/2022    ALKPHOS 99 02/23/2022    AST 29 02/23/2022    ALT 22 02/23/2022     PT/INR:  No results found for: PTINR  Lab Results   Component Value Date    CKTOTAL 34 10/17/2013    TROPONINI <0.01 02/23/2022       EKG:  I have reviewed EKG with the following interpretation:  Impression:  See HPI    EKG 2/23/2022  Sinus tachycardiaBaseline artifactNonspecific ST and T wave abnormality 140 bpm    Echo 2/23/2022  Summary   LV systolic function is reduced with ejection fraction estimated at 35%. There is mild global hypokinesis. There is mild concentric left ventricular hypertrophy. Elevated left ventricular diastolic filling pressure. The right atrium is mildly dilated. Mild posterior mitral annular calcification is present. Aortic valve appears sclerotic but opens adequately. Moderate mitral regurgitation. Mild aortic, tricuspid, and pulmonic regurgitation is present. Systolic pulmonary artery pressure (SPAP) estimated at 76 mmHg (RA pressure   8 mmHg), consistent with severe pulmonary hypertension. There is a trivial circumferential pericardial effusion noted. 11/21/2016 EF greater than 70%Dynamic outflow tract obstruction, LVH, DD1,   posterior MAC    CXR 2/23/2022  Findings most consistent with CHF with vascular congestion and interstitial   edema. CT Chest 2/23/2022  No evidence of pulmonary embolism.       COVID-19 pneumonia as above. Assessment:  Luigi Coelho is a 81 y.o. patient who presented to [facility] 2/23/2022 with complaints of SOB. She was in outpatient getting an ECHO for progressive SOB. Rapid response was called. She has PMH significant for HTN, HLD, and DM. No known cardiac history. Note Echo 11/21/2016 EF=>70%; mild cLVH; grade I DD. Presented to ED in acute respiratory failure. Reports 6 months of SOB mainly with exertion and recently c/o SOB with laying flat and waking up from sleep SOB. Symptoms improved with BiPap, steroids, albuterol, Lasix 40mg IV x 1, and Nitro-Bid ointment. She did test positive for COVID-19. Note EKG ST 140bpm; baseline artifact; nonspecific ST and T wave abnormality.   Most recent Echo 2/23/2022 EF=35%; mild global HK; mild cLVH; RA mildly dilated; AV sclerosis;; mild posterior MAC; mod MR; mild AR, TR and CO; severe pulmonary HTN (SPAP) estimated at 76 mmHg; BUN 21.  Cr 1.3. Calcium 11.1. BNP 13,005. Troponin <0.01. Note CXR consistent with CHF. Note Chest CT highly suggestive of COVID-19 pneumonia. Admitted to ICU on BiPAP and now on NC 3L. 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 Recs:  1. No volume overload on exam and creatinine increased after IV lasix. 2. Hold IV lasix and lisinopril/HCTZ. 3. Start coreg 3.125mg BID. 4. Start low dose ACE-I tomorrow if renal fcn OK and tolerates coreg. She became hypotensive overnight. 5. Will need ischemia evaluation with yomaira myoview but can likely be done as OP in few weeks after Covid infxn complete. 6. Goal will be to start [address] for cardiomyopathy and will have f/u in office with nuc stress testing also. Patient Active Problem List   Diagnosis    Osteoarthritis    Mixed hyperlipidemia    Essential hypertension    Diabetes mellitus ([address]    Vitamin D deficiency    Stress incontinence    Hypercalcemia    Shoulder impingement    Bilateral adhesive capsulitis of shoulders    Ruptured Bakers cyst    Tear of medial meniscus of right knee, current    Hyperuricemia    Acute respiratory failure with hypoxia ([address]       Thank you for allowing to us to participate in the care or Khadijah Hanley. Further evaluation will be based upon the patient's clinical course and testing results.

## 2022-02-24 NOTE — PROGRESS NOTES
AM assessment done. ABG results noted. 3 liters per NC placed. Results for Randall Shane (MRN [id number]) as of 2022 08:27   Ref.  Range 2022 07:45   Hemoglobin, Art, Extended Latest Ref Range: 12.0 - 16.0 g/dL 14.0   pH, Arterial Latest Ref Range: 7.350 - 7.450  7.447   pCO2, Arterial Latest Ref Range: 35.0 - 45.0 mmHg 49.9 (H)   pO2, Arterial Latest Ref Range: 75.0 - 108.0 mmHg 57.4 (L)   HCO3, Arterial Latest Ref Range: 21.0 - 29.0 mmol/L 33.7 (H)   TCO2 (calc), Art Latest Ref Range: Not Established mmol/L 35.2   Base Excess, Arterial Latest Ref Range: -3.0 - 3.0 mmol/L 8.2 (H)   O2 Sat, Arterial Latest Ref Range: >92 % 90.6 (L)   Methemoglobin, Arterial Latest Ref Range: <1.5 % 0.1   Carboxyhgb, Arterial Latest Ref Range: 0.0 - 1.5 % 0.4

## 2022-02-25 LAB
ANION GAP SERPL CALCULATED.3IONS-SCNC: 9 MMOL/L (ref 3–16)
BUN BLDV-MCNC: 36 MG/DL (ref 7–20)
C-REACTIVE PROTEIN: <3 MG/L (ref 0–5.1)
CALCIUM SERPL-MCNC: 10.6 MG/DL (ref 8.3–10.6)
CHLORIDE BLD-SCNC: 98 MMOL/L (ref 99–110)
CO2: 32 MMOL/L (ref 21–32)
CREAT SERPL-MCNC: 1.4 MG/DL (ref 0.6–1.2)
GFR AFRICAN AMERICAN: 43
GFR NON-AFRICAN AMERICAN: 36
GLUCOSE BLD-MCNC: 111 MG/DL (ref 70–99)
GLUCOSE BLD-MCNC: 128 MG/DL (ref 70–99)
GLUCOSE BLD-MCNC: 129 MG/DL (ref 70–99)
GLUCOSE BLD-MCNC: 131 MG/DL (ref 70–99)
GLUCOSE BLD-MCNC: 139 MG/DL (ref 70–99)
GLUCOSE BLD-MCNC: 181 MG/DL (ref 70–99)
GLUCOSE BLD-MCNC: 218 MG/DL (ref 70–99)
MAGNESIUM: 2.1 MG/DL (ref 1.8–2.4)
PERFORMED ON: ABNORMAL
POTASSIUM SERPL-SCNC: 3.9 MMOL/L (ref 3.5–5.1)
PRO-BNP: 9052 PG/ML (ref 0–449)
PROCALCITONIN: 0.23 NG/ML (ref 0–0.15)
SODIUM BLD-SCNC: 139 MMOL/L (ref 136–145)

## 2022-02-25 PROCEDURE — 97530 THERAPEUTIC ACTIVITIES: CPT

## 2022-02-25 PROCEDURE — 86140 C-REACTIVE PROTEIN: CPT

## 2022-02-25 PROCEDURE — 6370000000 HC RX 637 (ALT 250 FOR IP)

## 2022-02-25 PROCEDURE — 99232 SBSQ HOSP IP/OBS MODERATE 35: CPT | Performed by: INTERNAL MEDICINE

## 2022-02-25 PROCEDURE — 83735 ASSAY OF MAGNESIUM: CPT

## 2022-02-25 PROCEDURE — 97166 OT EVAL MOD COMPLEX 45 MIN: CPT

## 2022-02-25 PROCEDURE — 80048 BASIC METABOLIC PNL TOTAL CA: CPT

## 2022-02-25 PROCEDURE — 6360000002 HC RX W HCPCS

## 2022-02-25 PROCEDURE — 2060000000 HC ICU INTERMEDIATE R&B

## 2022-02-25 PROCEDURE — 2580000003 HC RX 258: Performed by: INTERNAL MEDICINE

## 2022-02-25 PROCEDURE — 2580000003 HC RX 258

## 2022-02-25 PROCEDURE — 97535 SELF CARE MNGMENT TRAINING: CPT

## 2022-02-25 PROCEDURE — 6370000000 HC RX 637 (ALT 250 FOR IP): Performed by: INTERNAL MEDICINE

## 2022-02-25 PROCEDURE — 2700000000 HC OXYGEN THERAPY PER DAY

## 2022-02-25 PROCEDURE — 99233 SBSQ HOSP IP/OBS HIGH 50: CPT | Performed by: NURSE PRACTITIONER

## 2022-02-25 PROCEDURE — 94761 N-INVAS EAR/PLS OXIMETRY MLT: CPT

## 2022-02-25 PROCEDURE — 36415 COLL VENOUS BLD VENIPUNCTURE: CPT

## 2022-02-25 PROCEDURE — 83880 ASSAY OF NATRIURETIC PEPTIDE: CPT

## 2022-02-25 PROCEDURE — 97162 PT EVAL MOD COMPLEX 30 MIN: CPT

## 2022-02-25 PROCEDURE — 6360000002 HC RX W HCPCS: Performed by: INTERNAL MEDICINE

## 2022-02-25 PROCEDURE — 94660 CPAP INITIATION&MGMT: CPT

## 2022-02-25 PROCEDURE — 84145 PROCALCITONIN (PCT): CPT

## 2022-02-25 RX ORDER — SODIUM CHLORIDE 9 MG/ML
INJECTION, SOLUTION INTRAVENOUS CONTINUOUS
Status: ACTIVE | OUTPATIENT
Start: 2022-02-25 | End: 2022-02-25

## 2022-02-25 RX ADMIN — INSULIN LISPRO 2 UNITS: 100 INJECTION, SOLUTION INTRAVENOUS; SUBCUTANEOUS at 16:56

## 2022-02-25 RX ADMIN — SODIUM CHLORIDE, PRESERVATIVE FREE 10 ML: 5 INJECTION INTRAVENOUS at 08:40

## 2022-02-25 RX ADMIN — DEXAMETHASONE SODIUM PHOSPHATE 6 MG: 10 INJECTION INTRAMUSCULAR; INTRAVENOUS at 08:41

## 2022-02-25 RX ADMIN — CARVEDILOL 3.12 MG: 3.12 TABLET, FILM COATED ORAL at 08:41

## 2022-02-25 RX ADMIN — MUPIROCIN: 20 OINTMENT TOPICAL at 20:09

## 2022-02-25 RX ADMIN — SODIUM CHLORIDE, PRESERVATIVE FREE 10 ML: 5 INJECTION INTRAVENOUS at 20:06

## 2022-02-25 RX ADMIN — OXYCODONE HYDROCHLORIDE AND ACETAMINOPHEN 500 MG: 500 TABLET ORAL at 08:41

## 2022-02-25 RX ADMIN — SODIUM CHLORIDE: 9 INJECTION, SOLUTION INTRAVENOUS at 06:24

## 2022-02-25 RX ADMIN — Medication 2000 UNITS: at 08:41

## 2022-02-25 RX ADMIN — ASPIRIN 81 MG: 81 TABLET, COATED ORAL at 08:41

## 2022-02-25 RX ADMIN — MUPIROCIN: 20 OINTMENT TOPICAL at 08:41

## 2022-02-25 RX ADMIN — OXYCODONE HYDROCHLORIDE AND ACETAMINOPHEN 500 MG: 500 TABLET ORAL at 20:06

## 2022-02-25 RX ADMIN — INSULIN LISPRO 4 UNITS: 100 INJECTION, SOLUTION INTRAVENOUS; SUBCUTANEOUS at 20:10

## 2022-02-25 RX ADMIN — CARVEDILOL 3.12 MG: 3.12 TABLET, FILM COATED ORAL at 16:55

## 2022-02-25 RX ADMIN — ENOXAPARIN SODIUM 40 MG: 100 INJECTION SUBCUTANEOUS at 08:41

## 2022-02-25 ASSESSMENT — PAIN SCALES - WONG BAKER

## 2022-02-25 ASSESSMENT — PAIN SCALES - GENERAL
PAINLEVEL_OUTOF10: 0

## 2022-02-25 NOTE — PROGRESS NOTES
Inpatient Occupational Therapy  Evaluation and Treatment    Unit: ICU  Date:  2/25/2022  Patient Name:    Tray Cook  Admitting diagnosis:  Acute respiratory failure with hypoxia [facility]) [J96.01]  Acute respiratory failure with hypoxia and hypercapnia ([facility] [address] [J96.01, J96.02]  Admit Date:  2/23/2022  Precautions/Restrictions/WB Status/ Lines/ Wounds/ Oxygen: fall risk, IV, bed/chair alarm, telemetry, ICU monitoring, continuous pulse ox and Droplet Plus precautions (+ COVID 19)  purewick     Treatment Time:  5983-3079  Treatment Number: 1     Billable Treatment Time: 30 minutes   Total Treatment Time:   40   minutes    Patient Goals for Therapy:  \" get up to chair \"      Discharge Recommendations: SNF  DME needs for discharge: defer to facility       Therapy recommendations for staff:   Assist of 1 with use of rolling walker (RW) for all transfers to/from BSC/chair    History of Present Illness: 81 y.o. female who presented to the hospital with a chief complaint of shortness of breath. Very pleasant 67-year-old female who was get an outpatient echocardiogram obtained for worsening dyspnea on exertion and at rest.  During her procedure she developed shortness of breath and difficulty breathing. A rapid response was called and she was taken to the emergency department for evaluation. In the emergency department she was tachypneic, hypoxic and acidotic. She was placed on BiPAP and was admitted to the ICU for further care. An echocardiogram obtained revealed new onset systolic dysfunction which was not there in prior echocardiograms with severe pulmonary hypertension. She was also positive for COVID-19. Of note she has been fully vaccinated with a booster. When I saw the patient she was awake, alert and not on BiPAP therapy. She will be observed in the ICU overnight. Home Health S4 Level Recommendation:  Level 1 Standard  AM-PAC Score:      Preadmission Environment    Pt.  Lives Alone family assist PRN as needed lives across street and 1 mi away  Home environment:  one story home  Steps to enter first floor:   No steps  2 steps in the living room (however plans on staying on main level and not using the living room)  Steps to second floor: N/A  Bathroom:  Tub/Shower unit, pt reports her shower is currently broken, higher toilet seat at home  Equipment owned:  [facility]   Sleeps in flat bed, but has a recliner can sleep in if needed    Preadmission Status / PLOF:  History of falls   No  Pt. Able to drive   Yes, family assists  Pt Fully independent with ADL's  Yes  Pt. Required assistance from family for:  Cleaning  Pt microwaves  Pt. Fully independent for transfers and gait and walked with: No Device    Pain  No  Rating:NA  Location:  Pain Medicine Status: No request made      Cognition    A&O x4   Able to follow 2 step commands    Subjective  Patient lying supine in bed with no family present   Pt agreeable to this OT eval & tx. Upper Extremity ROM:    WFL,  pt able to perform all bed mobility, transfers, and gait without ROM limitation. Upper Extremity Strength:    BUE strength impaired but not formally assessed w/ MMT    Upper Extremity Sensation    WFL    Upper Extremity Proprioception:  WFL    Coordination and Tone  WFL    Balance  Functional Sitting Balance:  WFL  Functional Standing Balance:Impaired  Min A HHA  Bed mobility:    Supine to sit:   SBA  Sit to supine:   Not Tested  Rolling:    Not Tested  Scooting in sitting:  SBA  Scooting to head of bed:   Not Tested    Bridging:   Not Tested    Transfers:    Sit to stand:  Min A  Stand to sit:  Min A  Bed to chair:   Min A HHA  Standard toilet: Not Tested  Bed to [facility]:  Not Tested    Dressing:      UE:   Not Tested  LE:    Min A    Bathing:    UE:  Not Tested  LE:  Not Tested    Eating:   Not Tested    Toileting:   Max A , purewick    Grooming: Not Tested    Activity Tolerance   Pt completed therapy session with SOB noted with activity  SpO2: 96% RA  HR: 98  BP: 127/108    Positioning Needs:   Up in chair, call light and needs in reach. Alarm Set    Exercise / Activities Initiated:   N/A    Patient/Family Education:   Role of OT  Recommendations for DC  Energy conservation techniques    Assessment of Deficits: Pt seen for Occupational therapy evaluation in acute care setting. Pt demonstrated decreased Activity tolerance, ADLs, IADLs, Bathing, Bed mobility, Safety Awareness, Strength and Transfers. Pt functioning below baseline and will likely benefit from skilled occupational therapy services to maximize safety and independence. Goal(s) : To be met in 3 Visits:  1). Bed to toilet/BSC: SBA    To be met in 5 Visits:  1). Supine to/from Sit:  SBA  2). Upper Body Bathing:   SBA  3). Lower Body Bathing:   SBA  4). Upper Body Dressing:  SBA  5). Lower Body Dressing:  SBA  6). Pt to demonstrate UE exs x 15 reps with minimal cues    Rehabilitation Potential:  Good for goals listed above. Strengths for achieving goals include: Pt motivated  Barriers to achieving goals include:  Weakness     Plan: To be seen 3-5 x/wk while in acute care setting for therapeutic exercises, bed mobility, transfers, dressing, bathing, family/patient education, ADL/IADL retraining, energy conservation training.      Morena Thompson, OTR/L 0040        If patient discharges from this facility prior to next visit, this note will serve as the Discharge Summary

## 2022-02-25 NOTE — PROGRESS NOTES
Pulmonary & Critical Care Medicine ICU Progress Note    CC: Orthopnea, hypoxemia    Events of Last 24 hours: Briefly hypotensive, now hypertensive, seen by cardiology  Feels good but not quite as good as yesterday    Vascular lines: IV: Peripheral    MV: None     / / /FiO2 : 50 %  Recent Labs     22  1320 22  0745   PHART 7.135* 7.447   [id number] 98.5* 49.9*   PO2ART 308.4* 57.4*       IV:   dextrose      sodium chloride         Vitals:  Blood pressure (!) 167/138, pulse 92, temperature 98.2 °F (36.8 °C), temperature source Oral, resp. rate 17, weight 193 lb 4.8 oz (87.7 kg), SpO2 98 %, not currently breastfeeding. on room air  Temp  Av.6 °F (36.4 °C)  Min: 96.9 °F (36.1 °C)  Max: 98.3 °F (36.8 °C)    Intake/Output Summary (Last 24 hours) at 2022 0612  Last data filed at 2022 1400  Gross per 24 hour   Intake --   Output 200 ml   Net -200 ml     PE:  Constitutional:  No acute distress. HENT:  Oropharynx is clear and moist.   Neck: No tracheal deviation present. Cardiovascular: Normal heart sounds. No lower extremity edema. Pulmonary/Chest: No wheezes. No rhonchi. No rales. No decreased breath sounds. No accessory muscle usage or stridor. Musculoskeletal: No cyanosis. No clubbing. Skin: Skin is warm and dry. Psychiatric: Normal mood and affect.   Neurologic: speech fluent, alert and oriented, strength symmetric        Scheduled Meds:   mupirocin   Nasal BID    carvedilol  3.125 mg Oral BID     enoxaparin  40 mg SubCUTAneous Daily    aspirin  81 mg Oral Daily    sodium chloride flush  5-40 mL IntraVENous 2 times per day    insulin lispro  0-12 Units SubCUTAneous Q4H    [Held by provider] furosemide  40 mg IntraVENous BID    dexamethasone  6 mg IntraVENous Q24H    Vitamin D  2,000 Units Oral Daily    ascorbic acid  500 mg Oral BID    [Held by provider] lisinopril-hydroCHLOROthiazide  1 tablet Oral Daily       Data:  CBC:   Recent Labs     22  1320 02/24/22  0516   WBC 12.2* 8.1   HGB 14.1 13.3   HCT 44.6 40.2   MCV 84.6 82.7    281     BMP:   Recent Labs     02/23/22  1320 02/24/22  0516 02/25/22  0443    141 139   K 4.5 3.9 3.9    99 98*   CO2 31 31 32   BUN 17 21* 36*   CREATININE 1.1 1.3* 1.4*     LIVER PROFILE:   Recent Labs     02/23/22  1320   AST 29   ALT 22   BILITOT 0.3   ALKPHOS 80       Micro:  2/23/2022 SARS-CoV-2 positive, influenza negative  2/23/2022 SARS-CoV-2 negative, influenza negative    Imaging:  CTPA 2/23/2022 personally reviewed  Pulmonary Arteries: Pulmonary arteries are adequately opacified for   evaluation.  No evidence of intraluminal filling defect to suggest pulmonary   embolism.  Main pulmonary artery is normal in caliber. Mediastinum: No evidence of mediastinal lymphadenopathy.  The heart and   pericardium demonstrate no acute abnormality.  There is no acute abnormality   of the thoracic aorta. Lungs/pleura: Bilateral ground-glass opacities, right greater than left, with   bilateral lower lobe nodular consolidations, highly suggestive of viral   pneumonia as seen with COVID-19.  Trace bilateral pleural effusions. Upper Abdomen: Limited images of the upper abdomen are unremarkable. Soft Tissues/Bones:  Age related degenerative changes of the visualized   osseous structures without focal destructive lesion.  Visualized soft tissues   are unremarkable.       Impression:       No evidence of pulmonary embolism. COVID-19 pneumonia as above.     -- ground glass is mild, UL predominant with RLL focal consolidation/atelectasis    ECHO 2/23/22   EF 35% (prior ECHO in 2016 with EF 70%)  Global hypokinesis  Elevated LVEDP  SPAP 76    ASSESSMENT:  · COVID-19 infection in a fully vaccinated patient  · Acute hypoxemic and hypercapnic respiratory failure 2/2 CHF  · Acute systolic heart failure  · Probable severe pulmonary hypertension (by Echo)  · Morbid obesity  · Mild JANAE  · DM    PLAN:   COVID-19 isolation, droplet plus   It is surprising that this patient had 2 identical SARS-CoV-2 test, only 1 of which was positive; CTPA could be c/w mild COVID pneumonia v CHF with atelectasis    Dexamethasone 6 mg daily for now   Hold Lasix, ACE, HCTZ and monitor    Give 1 L IVF over 12 h, recheck renal panel in AM   Coreg 3.125, dosing and addition of ACE will be per Dr. Benji Andre Inhaled bronchodilators only as needed, MDI preferred    Lovenox prophylaxis    Transfer to PCU    PT OT

## 2022-02-25 NOTE — CARE COORDINATION
INTERDISCIPLINARY PLAN OF CARE CONFERENCE    Date/Time: 2/25/2022 9:07 AM  Completed by: POLA Boyd   Case Management    Patient Name:  Paola Galeana  YOB: 1935  Admitting Diagnosis: Acute respiratory failure with hypoxia ([address] [J96.01]  Acute respiratory failure with hypoxia and hypercapnia ([address] [J96.01, J96.02]     Admit Date/Time:  2/23/2022  1:09 PM    Chart reviewed. Interdisciplinary team contacted or reviewed plan related to patient progress and discharge plans. Disciplines included Case Management, Nursing, and Dietitian. Current Status:Ongoing. PT/OT recommendation for discharge plan of care: ordered and pending    Expected D/C Disposition:  Home    Discharge Plan Comments: Chart review completed. Pt remains in the ICU. Completed Interdisciplinary rounds with ICU staff; room air per RN    Pt home lives at home and plans on returning home per conversation with pt yesterday. Writer left a message for pt's son Ambrocio Castillo yesterday and no call back received. CM will continue to follow and assist. Please notify CM if needs or concerns arise.     Home O2 in place on admit: No

## 2022-02-25 NOTE — PROGRESS NOTES
Inpatient Physical Therapy Evaluation and Treatment    Unit: ICU  Date:  2/25/2022  Patient Name:    Yeny Pérez  Admitting diagnosis:  Acute respiratory failure with hypoxia [facility]) [J96.01]  Acute respiratory failure with hypoxia and hypercapnia ([facility] [address] [J96.01, J96.02]  Admit Date:  2/23/2022  Precautions/Restrictions/WB Status/ Lines/ Wounds/ Oxygen: Fall risk, Lines -IV and Purewick catheter, Augustine (hard of hearing), Telemetry and Continuous pulse oximetry, ICU monitoring    Treatment Time:  14:05-14:45  Treatment Number:  1   Timed Code Treatment Minutes: 30 minutes  Total Treatment Minutes:  40  minutes    Patient Goals for Therapy: open to rehab. Eager to get out of bed today. Discharge Recommendations: SNF  DME needs for discharge: defer to facility         Therapy recommendation for EMS Transport: can transport by wheelchair    Therapy recommendations for staff:   Assist of 1 with use of rolling walker (RW) for all transfers to/from [facility]  to/from [facility]    History of Present Illness: Per Dr. Jayme Menard 2/23: \"81 y.o. female who presented to the hospital with a chief complaint of shortness of breath. Very pleasant 52-year-old female who was get an outpatient echocardiogram obtained for worsening dyspnea on exertion and at rest.  During her procedure she developed shortness of breath and difficulty breathing. A rapid response was called and she was taken to the emergency department for evaluation. In the emergency department she was tachypneic, hypoxic and acidotic. She was placed on BiPAP and was admitted to the ICU for further care. An echocardiogram obtained revealed new onset systolic dysfunction which was not there in prior echocardiograms with severe pulmonary hypertension. She was also positive for COVID-19. Of note she has been fully vaccinated with a booster. When I saw the patient she was awake, alert and not on BiPAP therapy. She will be observed in the ICU overnight. \"   PMHx including Min A at times. JOEL reach outside [place] is minimal.     Bed Mobility   Supine to Sit:    Supervision with HOB elevated  Sit to Supine:   Not Tested   (up to chair to end session)  Rolling:   Not Tested  Scooting in sitting: Supervision  Scooting in supine:  Not Tested    Transfer Training     Sit to stand:   Min A  - 1st trial from EOB with bilat HHA from therapists. 2-3rd trials from recliner with JOEL support of therapist and other hand pushing from armrest. Posterior LOB while rising on 2nd trial.   Stand to sit:   CGA  Bed to Chair:   Min A  with use of bilat HHA (trial with walker NV)    Gait gait deferred due to difficulty with transfers and desaturation; pt ambulated 0 ft. Stair Training deferred, pt unsafe/ not appropriate to complete stairs at this time    Activity Tolerance   Pt completed therapy session with No adverse symptoms noted w/activity - Tolerated session well. SpO2 min 84%; HR max 115; RR 20's. Positioning Needs   Pt up in chair, ICU monitoring, positioned in proper neutral alignment and pressure relief provided. Call light provided and all needs within reach    Exercises Initiated  all completed bilaterally unless indicated  Ankle Pumps x 10 reps    Other  None. Patient/Family Education   Pt educated on role of inpatient PT, POC, importance of continued activity, DC recommendations, transfer techniques, pacing activity and calling for assist with mobility. Assessment  Pt seen for Physical Therapy evaluation in acute care setting. Pt demonstrated decreased Activity tolerance, Balance and Safety as well as decreased independence with Ambulation and Transfers. Pt lives alone and previously was indep with all mobility and ADLs without an assistive device. Now demos unsteady balance during transfers and mild desaturation with activity. Pt will benefit from skilled PT in acute setting to promote activity tolerance and independent functional mobility.     Recommending SNF upon discharge as patient functioning well below baseline, demonstrates good rehab potential and unable to return home due to limited or no family support, home environment not conducive to patient recovery and limited safety awareness. Goals : To be met in 3 visits:  1). Independent with LE Ex x 10 reps    To be met in 6 visits:  1). Supine to/from sit: Independent  2). Sit to/from stand: Modified Independent  3). Bed to chair: Modified Independent  4). Gait: Ambulate 150 ft.  with  Modified Independent and use of LRAD (least restrictive assistive device)  5). Tolerate B LE exercises 3 sets of 10-15 reps  6). Ascend/descend 2 steps with Modified Independent with use of hand rail bilateral and LRAD (least restrictive assistive device)    Rehabilitation Potential: Good  Strengths for achieving goals include:   Pt motivated, PLOF, Family Support and Pt cooperative   Barriers to achieving goals include:    No Barriers    Plan    To be seen 3-5 x / week  while in acute care setting for therapeutic exercises, bed mobility, transfers, progressive gait training, balance training, and family/patient education. Signature: Maru Herrera, PT, DPT    If patient discharges from this facility prior to next visit, this note will serve as the Discharge Summary.

## 2022-02-25 NOTE — PROGRESS NOTES
IM Progress Note    Admit Date:  2/23/2022    Patient admitted with acute hypoxic respiratory failure, COVID-19 pneumonia. vaccinated patient. Initially admitted to ICU, required BiPAP. O2 sats have improved now. patient can be transferred to PCU. Subjective:  Ms. Herman Comment is stable today. Was on O2 3 L yesterday currently weaned down to room air,   Shortness of breath is improved. She has new cardiomyopathy and IV Lasix given for possible CHF on admission. Currently she is appears compensated, Lasix has been held due to elevated creatinine. Getting gentle IV hydration. Improved. BNP    Objective:   BP (!) 163/96   Pulse 90   Temp 98.2 °F (36.8 °C) (Oral)   Resp 19   Wt 194 lb 1.6 oz (88 kg)   SpO2 99%   Breastfeeding No   BMI 34.38 kg/m²       Intake/Output Summary (Last 24 hours) at 2/25/2022 1305  Last data filed at 2/25/2022 8170  Gross per 24 hour   Intake --   Output 500 ml   Net -500 ml         Physical Exam:   Patient seen in droplet plus precautions  General:  Awake, alert, NAD  Skin:  Warm and dry  Neck:  JVD absent. Neck supple  Chest:  Clear to auscultation, respiration easy. No wheezes, rales or rhonchi. Cardiovascular:  RRR ,S1S2 normal  Abdomen:  Soft, non tender, non distended, BS +  Extremities:  No edema. Intact peripheral pulses.  Brisk cap refill, < 2 secs  Neuro: non focal      Medications:   Scheduled Meds:   mupirocin   Nasal BID    carvedilol  3.125 mg Oral BID     enoxaparin  40 mg SubCUTAneous Daily    aspirin  81 mg Oral Daily    sodium chloride flush  5-40 mL IntraVENous 2 times per day    insulin lispro  0-12 Units SubCUTAneous Q4H    [Held by provider] furosemide  40 mg IntraVENous BID    dexamethasone  6 mg IntraVENous Q24H    Vitamin D  2,000 Units Oral Daily    ascorbic acid  500 mg Oral BID    [Held by provider] lisinopril-hydroCHLOROthiazide  1 tablet Oral Daily       Continuous Infusions:   sodium chloride 75 mL/hr at 02/25/22 0624    dextrose      sodium chloride         Data:  CBC:   Recent Labs     02/23/22  1320 02/24/22  0516   WBC 12.2* 8.1   RBC 5.27* 4.86   HGB 14.1 13.3   HCT 44.6 40.2   MCV 84.6 82.7   RDW 15.2 14.8    281     BMP:   Recent Labs     02/23/22  1320 02/24/22  0516 02/25/22  0443    141 139   K 4.5 3.9 3.9    99 98*   CO2 31 31 32   BUN 17 21* 36*   CREATININE 1.1 1.3* 1.4*     BNP: No results for input(s): BNP in the last 72 hours. PT/INR: No results for input(s): PROTIME, INR in the last 72 hours. APTT: No results for input(s): APTT in the last 72 hours. CARDIAC ENZYMES:   Recent Labs     02/23/22  1320   TROPONINI <0.01     FASTING LIPID PANEL:  Lab Results   Component Value Date    CHOL 266 (H) 02/24/2022    HDL 69 (H) 02/24/2022    TRIG 174 (H) 02/24/2022     LIVER PROFILE:   Recent Labs     02/23/22  1320   AST 29   ALT 22   BILITOT 0.3   ALKPHOS 99      Cultures  COVID PCR done twice. One is positive and one is negative. Influenza A and B not detected      Radiology  CT CHEST PULMONARY EMBOLISM W CONTRAST   Final Result   No evidence of pulmonary embolism. COVID-19 pneumonia as above. XR CHEST PORTABLE   Final Result   Findings most consistent with CHF with vascular congestion and interstitial   edema. ECHO      Summary   LV systolic function is reduced with ejection fraction estimated at 35%. There is mild global hypokinesis. There is mild concentric left ventricular hypertrophy. Elevated left ventricular diastolic filling pressure. The right atrium is mildly dilated. Mild posterior mitral annular calcification is present. Aortic valve appears sclerotic but opens adequately. Moderate mitral regurgitation. Mild aortic, tricuspid, and pulmonic regurgitation is present. Systolic pulmonary artery pressure (SPAP) estimated at 76 mmHg (RA pressure   8 mmHg), consistent with severe pulmonary hypertension.    There is a trivial circumferential pericardial effusion noted. 11/21/2016 EF greater than 70%Dynamic outflow tract obstruction, LVH, DD1,   posterior MAC       Assessment:  Principal Problem:    Acute respiratory failure with hypoxia (HCC)  Active Problems:    Cardiomyopathy (HCC)    SOB (shortness of breath)    COVID-19    Acute respiratory failure with hypoxia and hypercapnia (HCC)  Resolved Problems:    * No resolved hospital problems. *      Plan:    # Acute respiratory failure with hypoxia and hypercapnia  -Secondary to COVID-19 pneumonia, CHF  -Admitted to ICU, s/p BiPAP. She has improved now. Oxygen saturation stable on 3 L-> RA. Can transfer  to U      #COVID-19 pneumonia  -Vaccinated patient  -Covid testing done twice. One was positive and one was negative. Both were PCR studies   - CT chest did show changes consistent with Covid pneumonia, no PE.  -Oxygen saturation stable keep on droplet plus precautions for now  -Continue Decadron, day #2  -Inhaled bronchodilators    #Acute systolic CHF  #New cardiomyopathy  #Severe pulmonary hypertension  -Patient with shortness of breath for several months, she had outpatient echocardiogram done -> sent to the ER from echo department for increased shortness of breath.  -Echo shows decreased ejection fraction 35% with pulmonary hypertension.  -Cardiology consulted  -Patient got IV Lasix on admit. creatinine trending up today. Further doses of Lasix held. Hold ACE inhibitor's. Coreg added. Patient getting gentle IV hydration today. Monitor renal function. Monitor BNP . Improved from 13,000-9000 today    #Obesity    # DM-2  -On sliding scale insulin  - repeat hemoglobin A1c 6.4%    # Hypercalcemia  - monitor with diuresis  Calcium levels have improved now.  11.1-> 10.6          DVT Prophylaxis: Lovenox  ADULT DIET;  Regular; Low Fat/Low Chol/High Fiber/2 gm Na  Code Status: Full Code    Transfer to [facility]      Melissa Stack MD, MD 2/25/2022 1:05 PM Other

## 2022-02-25 NOTE — PROGRESS NOTES
Blood pressure (!) 134/98, pulse 86, temperature 98.3 °F (36.8 °C), temperature source Oral, resp. rate 20, weight 193 lb 4.8 oz (87.7 kg), SpO2 100 %, not currently breastfeeding. Shift assessment complete. See doc flow. Nightly medications given see MAR. Patient with no complaints at this time. Patient lung sound diminished. No coughing noted. Abdomen soft. BS + x 4 quads. No edema noted. Patient continues on 3 L O2 via NC. Oxygen saturations staying > 96%. Call light and bedside table within easy reach.      Electronically signed by Shannon Negron RN on 2/24/2022 at 8:19 PM

## 2022-02-25 NOTE — PROGRESS NOTES
[facility]   Daily Progress Note    Admit Date:  2/23/2022  HPI:    Chief Complaint   Patient presents with    Shortness of Breath     In outpatient getting an ECHO for progressive SOB. Rapid response was called due to respiratory distress. Study had just started nd         Interval history: Donita Paniagua is being followed for shortness of breath. Treated for COVID pnuemonia, required Bipap. Work up showed LVEF down to 35% with severe pulmonary HTN and trival pericardial effusion. Subjective:  Ms. Levi Mack is working with therapy. Breathing is improved. No chest pain. Off of oxygen. No dizziness with getting up to the side of the bed.      Objective:   BP (!) 163/96   Pulse 97   Temp 98.2 °F (36.8 °C) (Oral)   Resp 21   Wt 194 lb 1.6 oz (88 kg)   SpO2 93%   Breastfeeding No   BMI 34.38 kg/m²       Intake/Output Summary (Last 24 hours) at 2/25/2022 1402  Last data filed at 2/25/2022 1449  Gross per 24 hour   Intake --   Output 300 ml   Net -300 ml       NYHA: III    Physical Exam:  General:  Awake, alert, NAD  Skin:  Warm and dry  Neck:  JVD difficult to assess due to body habitus   Chest:  Diminished throughout to auscultation, no wheezes/rhonchi/ + fine rales   Telemetry: SR/ST   Cardiovascular:  RRR S1S2, no m/r/g   Abdomen:  Soft, nontender, +bowel sounds  Extremities:  No  bilateral lower extremity edema    Medications:    mupirocin   Nasal BID    carvedilol  3.125 mg Oral BID WC    enoxaparin  40 mg SubCUTAneous Daily    aspirin  81 mg Oral Daily    sodium chloride flush  5-40 mL IntraVENous 2 times per day    insulin lispro  0-12 Units SubCUTAneous Q4H    [Held by provider] furosemide  40 mg IntraVENous BID    dexamethasone  6 mg IntraVENous Q24H    Vitamin D  2,000 Units Oral Daily    ascorbic acid  500 mg Oral BID    [Held by provider] lisinopril-hydroCHLOROthiazide  1 tablet Oral Daily      sodium chloride 75 mL/hr at 02/25/22 0624    dextrose      sodium chloride         Lab Data:  CBC:   Recent Labs     02/23/22  1320 02/24/22  0516   WBC 12.2* 8.1   HGB 14.1 13.3    281     BMP:    Recent Labs     02/23/22  1320 02/24/22  0516 02/25/22  0443    141 139   K 4.5 3.9 3.9   CO2 31 31 32   BUN 17 21* 36*   CREATININE 1.1 1.3* 1.4*     INR:  No results for input(s): INR in the last 72 hours. BNP:    Recent Labs     02/23/22  1320 02/25/22  0443   PROBNP 13,005* 9,052*     Lab Results   Component Value Date    LVEF 70 11/21/2016       Testing:    Echo 2/23/2022  Summary   LV systolic function is reduced with ejection fraction estimated at 35%.   There is mild global hypokinesis.   There is mild concentric left ventricular hypertrophy.   Elevated left ventricular diastolic filling pressure.   The right atrium is mildly dilated.   Mild posterior mitral annular calcification is present.   Aortic valve appears sclerotic but opens adequately.   Moderate mitral regurgitation.   Mild aortic, tricuspid, and pulmonic regurgitation is present.   Systolic pulmonary artery pressure (SPAP) estimated at 76 mmHg (RA pressure   8 mmHg), consistent with severe pulmonary hypertension.  Marielos Vickey is a trivial circumferential pericardial effusion noted.   11/21/2016 EF greater than 70%Dynamic outflow tract obstruction, LVH, DD1,   posterior MAC  Principal Problem:    Acute respiratory failure with hypoxia (HCC)  Active Problems:    Cardiomyopathy (HCC)    SOB (shortness of breath)    COVID-19    Acute respiratory failure with hypoxia and hypercapnia (HCC)  Resolved Problems:    * No resolved hospital problems.  *    Assessment:  Acute respiratory failure- improving   COVID pneumonia  Acute systolic heart failure  HFrEF 35%  Cardiomyopathy unknown etiology  Severe pumonary HTN  HTN  HLD- statin allergy noted   DM      Plan:  D/c lasix due to mild JANAE   D/c lisniopril/HCTZ  Once creatinine improved, then would recommend low dose valsartan vs entresto 24/26mg BID  Coreg 3.125mg BID Daily weights  Daily BMP   getting gentle IVFs today   Noted patient's statin allergy- leg pains, need to consider retrial     Will consider ischemic evaluation once patient recovers from [facility] [place] [facility]\"", would be as outpatient and pending clinical course    Core measures for HFrEF (EF <40%):  EF: 35%   ICD: NA, undergoing titration GDMT  ACEi/ARB/ARNI: pending renal improvement  BB:coreg   Christian: consider pending renal improvement   SGLT2: hold off until renal improved and has been started on entresto or valsartan     Education provided today Disease process and medications discussed. Questions answered fully.   Total Time spent educating today 10 minutes     POOL Reed - CNP,  2/25/2022, 2:02 PM

## 2022-02-25 NOTE — PROGRESS NOTES
CM-SR, VSS, pt remains afebrile. Pt is taking PO well. Pt is humaira RA. She is up in the chair & tolerating well. Pt is w/out evidence of distress @ this time.

## 2022-02-26 LAB
ANION GAP SERPL CALCULATED.3IONS-SCNC: 10 MMOL/L (ref 3–16)
BASOPHILS ABSOLUTE: 0 K/UL (ref 0–0.2)
BASOPHILS RELATIVE PERCENT: 0 %
BUN BLDV-MCNC: 34 MG/DL (ref 7–20)
CALCIUM SERPL-MCNC: 10.9 MG/DL (ref 8.3–10.6)
CHLORIDE BLD-SCNC: 101 MMOL/L (ref 99–110)
CO2: 30 MMOL/L (ref 21–32)
CREAT SERPL-MCNC: 1.2 MG/DL (ref 0.6–1.2)
EKG ATRIAL RATE: 92 BPM
EKG DIAGNOSIS: NORMAL
EKG P AXIS: 75 DEGREES
EKG P-R INTERVAL: 160 MS
EKG Q-T INTERVAL: 366 MS
EKG QRS DURATION: 90 MS
EKG QTC CALCULATION (BAZETT): 452 MS
EKG R AXIS: -28 DEGREES
EKG T AXIS: 89 DEGREES
EKG VENTRICULAR RATE: 92 BPM
EOSINOPHILS ABSOLUTE: 0 K/UL (ref 0–0.6)
EOSINOPHILS RELATIVE PERCENT: 0 %
GFR AFRICAN AMERICAN: 52
GFR NON-AFRICAN AMERICAN: 43
GLUCOSE BLD-MCNC: 100 MG/DL (ref 70–99)
GLUCOSE BLD-MCNC: 109 MG/DL (ref 70–99)
GLUCOSE BLD-MCNC: 110 MG/DL (ref 70–99)
GLUCOSE BLD-MCNC: 117 MG/DL (ref 70–99)
GLUCOSE BLD-MCNC: 124 MG/DL (ref 70–99)
GLUCOSE BLD-MCNC: 142 MG/DL (ref 70–99)
GLUCOSE BLD-MCNC: 188 MG/DL (ref 70–99)
GLUCOSE BLD-MCNC: 99 MG/DL (ref 70–99)
HCT VFR BLD CALC: 40 % (ref 36–48)
HEMOGLOBIN: 13 G/DL (ref 12–16)
LYMPHOCYTES ABSOLUTE: 1.9 K/UL (ref 1–5.1)
LYMPHOCYTES RELATIVE PERCENT: 21 %
MAGNESIUM: 2.1 MG/DL (ref 1.8–2.4)
MCH RBC QN AUTO: 27.2 PG (ref 26–34)
MCHC RBC AUTO-ENTMCNC: 32.5 G/DL (ref 31–36)
MCV RBC AUTO: 83.6 FL (ref 80–100)
MONOCYTES ABSOLUTE: 0.3 K/UL (ref 0–1.3)
MONOCYTES RELATIVE PERCENT: 3 %
NEUTROPHILS ABSOLUTE: 6.9 K/UL (ref 1.7–7.7)
NEUTROPHILS RELATIVE PERCENT: 76 %
PDW BLD-RTO: 14.8 % (ref 12.4–15.4)
PERFORMED ON: ABNORMAL
PERFORMED ON: NORMAL
PLATELET # BLD: 274 K/UL (ref 135–450)
PLATELET SLIDE REVIEW: ADEQUATE
PMV BLD AUTO: 10.1 FL (ref 5–10.5)
POTASSIUM SERPL-SCNC: 4.3 MMOL/L (ref 3.5–5.1)
RBC # BLD: 4.78 M/UL (ref 4–5.2)
SLIDE REVIEW: NORMAL
SODIUM BLD-SCNC: 141 MMOL/L (ref 136–145)
WBC # BLD: 9.1 K/UL (ref 4–11)

## 2022-02-26 PROCEDURE — 94761 N-INVAS EAR/PLS OXIMETRY MLT: CPT

## 2022-02-26 PROCEDURE — 6370000000 HC RX 637 (ALT 250 FOR IP): Performed by: INTERNAL MEDICINE

## 2022-02-26 PROCEDURE — 94660 CPAP INITIATION&MGMT: CPT

## 2022-02-26 PROCEDURE — 2580000003 HC RX 258

## 2022-02-26 PROCEDURE — 99232 SBSQ HOSP IP/OBS MODERATE 35: CPT | Performed by: INTERNAL MEDICINE

## 2022-02-26 PROCEDURE — 83735 ASSAY OF MAGNESIUM: CPT

## 2022-02-26 PROCEDURE — 6370000000 HC RX 637 (ALT 250 FOR IP)

## 2022-02-26 PROCEDURE — 2060000000 HC ICU INTERMEDIATE R&B

## 2022-02-26 PROCEDURE — 97530 THERAPEUTIC ACTIVITIES: CPT

## 2022-02-26 PROCEDURE — 36415 COLL VENOUS BLD VENIPUNCTURE: CPT

## 2022-02-26 PROCEDURE — 85025 COMPLETE CBC W/AUTO DIFF WBC: CPT

## 2022-02-26 PROCEDURE — 93005 ELECTROCARDIOGRAM TRACING: CPT | Performed by: INTERNAL MEDICINE

## 2022-02-26 PROCEDURE — 6360000002 HC RX W HCPCS: Performed by: INTERNAL MEDICINE

## 2022-02-26 PROCEDURE — 93010 ELECTROCARDIOGRAM REPORT: CPT | Performed by: INTERNAL MEDICINE

## 2022-02-26 PROCEDURE — 80048 BASIC METABOLIC PNL TOTAL CA: CPT

## 2022-02-26 RX ORDER — POLYETHYLENE GLYCOL 3350 17 G/17G
17 POWDER, FOR SOLUTION ORAL DAILY
Status: DISCONTINUED | OUTPATIENT
Start: 2022-02-26 | End: 2022-03-02 | Stop reason: HOSPADM

## 2022-02-26 RX ORDER — SPIRONOLACTONE 25 MG/1
25 TABLET ORAL
Status: DISCONTINUED | OUTPATIENT
Start: 2022-02-26 | End: 2022-03-02 | Stop reason: HOSPADM

## 2022-02-26 RX ORDER — DOCUSATE SODIUM 100 MG/1
100 CAPSULE, LIQUID FILLED ORAL DAILY
Status: DISCONTINUED | OUTPATIENT
Start: 2022-02-26 | End: 2022-03-02 | Stop reason: HOSPADM

## 2022-02-26 RX ADMIN — MUPIROCIN: 20 OINTMENT TOPICAL at 09:51

## 2022-02-26 RX ADMIN — SODIUM CHLORIDE, PRESERVATIVE FREE 10 ML: 5 INJECTION INTRAVENOUS at 20:20

## 2022-02-26 RX ADMIN — POLYETHYLENE GLYCOL 3350 17 G: 17 POWDER, FOR SOLUTION ORAL at 16:35

## 2022-02-26 RX ADMIN — SODIUM CHLORIDE, PRESERVATIVE FREE 10 ML: 5 INJECTION INTRAVENOUS at 08:39

## 2022-02-26 RX ADMIN — INSULIN LISPRO 2 UNITS: 100 INJECTION, SOLUTION INTRAVENOUS; SUBCUTANEOUS at 08:39

## 2022-02-26 RX ADMIN — INSULIN LISPRO 2 UNITS: 100 INJECTION, SOLUTION INTRAVENOUS; SUBCUTANEOUS at 11:12

## 2022-02-26 RX ADMIN — ASPIRIN 81 MG: 81 TABLET, COATED ORAL at 08:37

## 2022-02-26 RX ADMIN — CARVEDILOL 3.12 MG: 3.12 TABLET, FILM COATED ORAL at 16:35

## 2022-02-26 RX ADMIN — MUPIROCIN: 20 OINTMENT TOPICAL at 20:20

## 2022-02-26 RX ADMIN — ENOXAPARIN SODIUM 40 MG: 100 INJECTION SUBCUTANEOUS at 08:37

## 2022-02-26 RX ADMIN — OXYCODONE HYDROCHLORIDE AND ACETAMINOPHEN 500 MG: 500 TABLET ORAL at 20:20

## 2022-02-26 RX ADMIN — OXYCODONE HYDROCHLORIDE AND ACETAMINOPHEN 500 MG: 500 TABLET ORAL at 08:39

## 2022-02-26 RX ADMIN — SPIRONOLACTONE 25 MG: 25 TABLET ORAL at 11:11

## 2022-02-26 RX ADMIN — CARVEDILOL 3.12 MG: 3.12 TABLET, FILM COATED ORAL at 08:37

## 2022-02-26 RX ADMIN — Medication 2000 UNITS: at 08:37

## 2022-02-26 ASSESSMENT — PAIN SCALES - WONG BAKER
WONGBAKER_NUMERICALRESPONSE: 0

## 2022-02-26 ASSESSMENT — PAIN SCALES - GENERAL
PAINLEVEL_OUTOF10: 0
PAINLEVEL_OUTOF10: 0

## 2022-02-26 NOTE — PROGRESS NOTES
Pulmonary & Critical Care Medicine ICU Progress Note    CC: Orthopnea, hypoxemia    Events of Last 24 hours:   Feels pretty good, no orthopnea, thinking about going home    Vascular lines: IV: Peripheral    MV: None     / / /FiO2 : 50 %  Recent Labs     22  1320 22  0745   PHART 7.135* 7.447   [id number] 98.5* 49.9*   PO2ART 308.4* 57.4*       IV:   dextrose      sodium chloride         Vitals:  Blood pressure (!) 151/89, pulse 89, temperature 98.2 °F (36.8 °C), temperature source Oral, resp. rate 19, weight 195 lb 9.6 oz (88.7 kg), SpO2 90 %, not currently breastfeeding. on room air  Temp  Av.5 °F (36.9 °C)  Min: 98.2 °F (36.8 °C)  Max: 99.2 °F (37.3 °C)    Intake/Output Summary (Last 24 hours) at 2022 0724  Last data filed at 2022 0559  Gross per 24 hour   Intake 583.18 ml   Output 800 ml   Net -216.82 ml     PE:  Constitutional:  No acute distress. HENT:  Oropharynx is clear and moist.   Neck: No tracheal deviation present. Cardiovascular: Normal heart sounds. No lower extremity edema. Pulmonary/Chest: No wheezes. No rhonchi. No rales. No decreased breath sounds. No accessory muscle usage or stridor. Musculoskeletal: No cyanosis. No clubbing. Skin: Skin is warm and dry. Psychiatric: Normal mood and affect.   Neurologic: speech fluent, alert and oriented, strength symmetric        Scheduled Meds:   mupirocin   Nasal BID    carvedilol  3.125 mg Oral BID     enoxaparin  40 mg SubCUTAneous Daily    aspirin  81 mg Oral Daily    sodium chloride flush  5-40 mL IntraVENous 2 times per day    insulin lispro  0-12 Units SubCUTAneous Q4H    dexamethasone  6 mg IntraVENous Q24H    Vitamin D  2,000 Units Oral Daily    ascorbic acid  500 mg Oral BID       Data:  CBC:   Recent Labs     22  1320 22  0516   WBC 12.2* 8.1   HGB 14.1 13.3   HCT 44.6 40.2   MCV 84.6 82.7    281     BMP:   Recent Labs     22  0516 22  0443 22  0330    139 141   K 3.9 3.9 4.3   CL 99 98* 101   CO2 31 32 30   BUN 21* 36* 34*   CREATININE 1.3* 1.4* 1.2     LIVER PROFILE:   Recent Labs     02/23/22  1320   AST 29   ALT 22   BILITOT 0.3   ALKPHOS 80       Micro:  2/23/2022 SARS-CoV-2 positive, influenza negative  2/23/2022 SARS-CoV-2 negative, influenza negative    Imaging:  CTPA 2/23/2022 personally reviewed  Pulmonary Arteries: Pulmonary arteries are adequately opacified for   evaluation.  No evidence of intraluminal filling defect to suggest pulmonary   embolism.  Main pulmonary artery is normal in caliber. Mediastinum: No evidence of mediastinal lymphadenopathy.  The heart and   pericardium demonstrate no acute abnormality.  There is no acute abnormality   of the thoracic aorta. Lungs/pleura: Bilateral ground-glass opacities, right greater than left, with   bilateral lower lobe nodular consolidations, highly suggestive of viral   pneumonia as seen with COVID-19.  Trace bilateral pleural effusions. Upper Abdomen: Limited images of the upper abdomen are unremarkable. Soft Tissues/Bones:  Age related degenerative changes of the visualized   osseous structures without focal destructive lesion.  Visualized soft tissues   are unremarkable.       Impression:       No evidence of pulmonary embolism. COVID-19 pneumonia as above.     -- ground glass is mild, UL predominant with RLL focal consolidation/atelectasis    ECHO 2/23/22   EF 35% (prior ECHO in 2016 with EF 70%)  Global hypokinesis  Elevated LVEDP  SPAP 76    ASSESSMENT:  · COVID-19 infection in a fully vaccinated patient  · Acute hypoxemic and hypercapnic respiratory failure 2/2 CHF  · Acute systolic heart failure  · Probable severe pulmonary hypertension (by Echo)  · Morbid obesity  · Mild JANAE  · DM    PLAN:   COVID-19 isolation, droplet plus   It is surprising that this patient had 2 identical SARS-CoV-2 test, only 1 of which was positive; CTPA could be c/w mild COVID pneumonia v CHF with atelectasis    Dexamethasone can be stopped    Holding lasix, patient has matched I/O's despite 1 L IVF yesterday for JANAE   Heart failure drugs per Dr. Sesay Morning    Lovenox prophylaxis    Transferred to PCU care yesterday.   I will not follow once she moves out of the ICU    I d/w Dr. Sesay Morning

## 2022-02-26 NOTE — PLAN OF CARE
Problem: Skin Integrity:  Goal: Will show no infection signs and symptoms  Description: Will show no infection signs and symptoms  Outcome: Ongoing  Goal: Absence of new skin breakdown  Description: Absence of new skin breakdown  Outcome: Ongoing     Problem: SAFETY  Goal: Free from accidental physical injury  Outcome: Ongoing  Goal: Free from intentional harm  Outcome: Ongoing     Problem: DAILY CARE  Goal: Daily care needs are met  Outcome: Ongoing     Problem: PAIN  Goal: Patient's pain/discomfort is manageable  Outcome: Ongoing     Problem: SKIN INTEGRITY  Goal: Skin integrity is maintained or improved  Outcome: Ongoing     Problem: KNOWLEDGE DEFICIT  Goal: Patient/S.O. demonstrates understanding of disease process, treatment plan, medications, and discharge instructions. Outcome: Ongoing     Problem: DISCHARGE BARRIERS  Goal: Patient's continuum of care needs are met  Outcome: Ongoing   Patient is able to demonstrate the ability to move from a reclining position to an upright position within the recliner.

## 2022-02-26 NOTE — PROGRESS NOTES
Inpatient Occupational Therapy Treatment    Unit: ICU  Date:  2/26/2022  Patient Name:    Paola Galeana  Admitting diagnosis:  Acute respiratory failure with hypoxia [facility]) [J96.01]  Acute respiratory failure with hypoxia and hypercapnia ([facility] [J96.01, J96.02]  Admit Date:  2/23/2022  Precautions/Restrictions/WB Status/ Lines/ Wounds/ Oxygen: fall risk, IV, bed/chair alarm, telemetry, ICU monitoring, continuous pulse ox and Droplet Plus precautions (+ COVID 19)  purewick     Treatment Time:  [id number]  Treatment Number: 1     Billable Treatment Time:  25 minutes   Total Treatment Time:   25   minutes    Patient Goals for Therapy:  \" get moving \"      Discharge Recommendations: SNF  DME needs for discharge: defer to facility       Therapy recommendations for staff:   Assist of 1 with use of rolling walker (RW) for all transfers to/from BSC/chair    History of Present Illness: 81 y.o. female who presented to the hospital with a chief complaint of shortness of breath. Very pleasant 52-year-old female who was get an outpatient echocardiogram obtained for worsening dyspnea on exertion and at rest.  During her procedure she developed shortness of breath and difficulty breathing. A rapid response was called and she was taken to the emergency department for evaluation. In the emergency department she was tachypneic, hypoxic and acidotic. She was placed on BiPAP and was admitted to the ICU for further care. An echocardiogram obtained revealed new onset systolic dysfunction which was not there in prior echocardiograms with severe pulmonary hypertension. She was also positive for COVID-19. Of note she has been fully vaccinated with a booster. When I saw the patient she was awake, alert and not on BiPAP therapy. She will be observed in the ICU overnight.     Home Health S4 Level Recommendation:  Level 1 Standard  AM-PAC Score: AM-PAC Inpatient Daily Activity Raw Score: 16    Pain  No  Rating:NA  Location:  Pain Medicine Status: No request made      Cognition    A&O x4   Able to follow 2 step commands    Subjective  Patient lying supine in bed with no family present   Pt agreeable to this OT eval & tx. Balance  Functional Sitting Balance:  WFL  Functional Standing Balance:Diminished  CGA to Min A, HHA   Recommend walker trial at next session     Bed mobility:    Supine to sit:   SBA  Sit to supine:   Not Tested  Rolling:    Not Tested  Scooting in sitting:  SBA  Scooting to head of bed:   Not Tested    Bridging:   Not Tested    Transfers:    Sit to stand:  Min A  Stand to sit:  Min A  Bed to chair:   Min A HHA  Standard toilet: Not Tested  Bed to [facility]:  Not Tested    Dressing:      UE:   Not Tested  LE:    Min A    Bathing:    UE:  Not Tested  LE:  Not Tested    Eating:   Not Tested    Toileting:  Not Tested    Grooming: Not Tested    Activity Tolerance   Pt completed therapy session with SOB noted with activity  SpO2 > 92 % on room air     Positioning Needs:   Up in chair, call light and needs in reach. Alarm Set    Exercise / Activities Initiated:   N/A    Patient/Family Education:   Role of OT  Recommendations for DC  Energy conservation techniques    Assessment of Deficits: Pt with good progress this date. Pt may benefit from continued skilled occupational therapy while in the hospital in order to progress to a safe and more indpt level of functioning. Goal(s) : To be met in 3 Visits:  1). Bed to toilet/BSC: SBA    To be met in 5 Visits:  1). Supine to/from Sit:  SBA  2). Upper Body Bathing:   SBA  3). Lower Body Bathing:   SBA  4). Upper Body Dressing:  SBA  5). Lower Body Dressing:  SBA  6).  Pt to demonstrate UE exs x 15 reps with minimal cues     Plan:  Continue POC      Chel Browne, LUCA, OTR/L   [id number]         If patient discharges from this facility prior to next visit, this note will serve as the Discharge Summary

## 2022-02-26 NOTE — PROGRESS NOTES
Pt daughter chuyita requeststhat pt doesn't go to [facility] is pt is requiring rehab unless pt only option. While this RN was in patient rounds 3 call for pt update taken by prior nurses. Chuyita updated and requested that she would be the point person for updates from staff and disseminate updates to all other family members per pt wishes.  Chuyita agreeable

## 2022-02-26 NOTE — PROGRESS NOTES
IM Progress Note    Admit Date:  2/23/2022    Patient admitted with acute hypoxic respiratory failure, COVID-19 pneumonia. vaccinated patient. Initially admitted to ICU, required BiPAP. O2 sats have improved now. patient can be transferred to PCU. Subjective:  Ms. Concha Hernandez is stable today remains off o2 . Able to sit up in chair  Reports constipation for 3 days        She has new cardiomyopathy and IV Lasix given for possible CHF on admission. Currently she is appears compensated, Lasix has been held due to elevated creatinine. Objective:   BP (!) 162/75   Pulse 72   Temp 97.5 °F (36.4 °C) (Oral)   Resp 19   Wt 195 lb 9.6 oz (88.7 kg)   SpO2 97%   Breastfeeding No   BMI 34.65 kg/m²       Intake/Output Summary (Last 24 hours) at 2/26/2022 1328  Last data filed at 2/26/2022 1132  Gross per 24 hour   Intake 783.18 ml   Output 1000 ml   Net -216.82 ml         Physical Exam:   Patient seen in droplet plus precautions  General:  Obese elderly female up in bed  Awake, alert, NAD  Skin:  Warm and dry  Neck:  JVD absent. Neck supple  Chest:  Clear to auscultation, respiration easy. No wheezes, rales or rhonchi. Cardiovascular:  RRR ,S1S2 normal  Abdomen:  Soft, non tender, non distended, BS +  Extremities:  Trace benja LE edema resolved  Intact peripheral pulses.  Brisk cap refill, < 2 secs  Neuro: non focal      Medications:   Scheduled Meds:   spironolactone  25 mg Oral Lunch    mupirocin   Nasal BID    carvedilol  3.125 mg Oral BID WC    enoxaparin  40 mg SubCUTAneous Daily    aspirin  81 mg Oral Daily    sodium chloride flush  5-40 mL IntraVENous 2 times per day    insulin lispro  0-12 Units SubCUTAneous Q4H    Vitamin D  2,000 Units Oral Daily    ascorbic acid  500 mg Oral BID       Continuous Infusions:   dextrose      sodium chloride         Data:  CBC:   Recent Labs     02/24/22  0516 02/26/22  0330   WBC 8.1 9.1   RBC 4.86 4.78   HGB 13.3 13.0   HCT 40.2 40.0   MCV 82.7 83.6   RDW 14.8 14.8    274     BMP:   Recent Labs     02/24/22  0516 02/25/22  0443 02/26/22  0330    139 141   K 3.9 3.9 4.3   CL 99 98* 101   CO2 31 32 30   BUN 21* 36* 34*   CREATININE 1.3* 1.4* 1.2       Cultures  COVID PCR done twice. One is positive and one is negative. Influenza A and B not detected      Radiology  CT CHEST PULMONARY EMBOLISM W CONTRAST   Final Result   No evidence of pulmonary embolism. COVID-19 pneumonia as above. XR CHEST PORTABLE   Final Result   Findings most consistent with CHF with vascular congestion and interstitial   edema. ECHO      Summary   LV systolic function is reduced with ejection fraction estimated at 35%. There is mild global hypokinesis. There is mild concentric left ventricular hypertrophy. Elevated left ventricular diastolic filling pressure. The right atrium is mildly dilated. Mild posterior mitral annular calcification is present. Aortic valve appears sclerotic but opens adequately. Moderate mitral regurgitation. Mild aortic, tricuspid, and pulmonic regurgitation is present. Systolic pulmonary artery pressure (SPAP) estimated at 76 mmHg (RA pressure   8 mmHg), consistent with severe pulmonary hypertension. There is a trivial circumferential pericardial effusion noted. 11/21/2016 EF greater than 70%Dynamic outflow tract obstruction, LVH, DD1,   posterior MAC       Assessment:  Principal Problem:    Acute respiratory failure with hypoxia (HCC)  Active Problems:    Cardiomyopathy (HCC)    SOB (shortness of breath)    COVID-19    Acute respiratory failure with hypoxia and hypercapnia (HCC)    Acute systolic CHF (congestive heart failure) (HCC)  Resolved Problems:    * No resolved hospital problems. *      Plan:    # Acute respiratory failure with hypoxia and hypercapnia  -Secondary to COVID-19 pneumonia, CHF  -Admitted to ICU, s/p BiPAP. She has improved now. Oxygen saturation stable on 3 L-> RA.   Can transfer  to U      #COVID-19 pneumonia  -Vaccinated patient  -Covid testing done twice. One was positive and one was negative. Both were PCR studies   - CT chest did show changes consistent with Covid pneumonia, no PE.  -Oxygen saturation stable keep on droplet plus precautions for now  -Continue Decadron, day #3  -Inhaled bronchodilators    #Acute systolic CHF  #New cardiomyopathy  #Severe pulmonary hypertension  -Patient with shortness of breath for several months, she had outpatient echocardiogram done -> sent to the ER from echo department for increased shortness of breath.  -Echo shows decreased ejection fraction 35% with pulmonary hypertension.  -Cardiology consulted  -Patient got IV Lasix on admit. creatinine trending up today. Further doses of Lasix held. Hold ACE inhibitor's. Coreg added. Monitor renal function. Improved- can add ACEi        # DM-2  -On sliding scale insulin  - repeat hemoglobin A1c 6.4%    # Hypercalcemia  - monitor with diuresis  Calcium levels have improved now.  11.1-> 10.6     #Obesity     DVT Prophylaxis: Lovenox  ADULT DIET;  Regular; Low Fat/Low Chol/High Fiber/2 gm Na  Code Status: Full Code    Transfer to U      Myrna Avilez MD, MD 2/26/2022 1:28 PM

## 2022-02-26 NOTE — FLOWSHEET NOTE
02/26/22 0800   Vital Signs   Temp 97.9 °F (36.6 °C)   Temp Source Oral   Pulse 68   Heart Rate Source Monitor   Resp 17   BP (!) 161/111   MAP (mmHg) 125   Level of Consciousness Alert (0)   MEWS Score 1   Oxygen Therapy   SpO2 97 %   O2 Device None (Room air)   Pt resting in bed, vitals per doc flow. Assessment complete see doc flow.  w/ occasional PVC's on ICU bedside monitor. SPO2 96% on RA with CSPO2 monitoring. Pt A&O x 4. PERRL. PIV x 1 WDL LAC occluded, will remove when pt finishes breakfast. purewick secured draining clear yellow urine. Pt repositioned for comfort. Will continue to closely monitor.

## 2022-02-26 NOTE — FLOWSHEET NOTE
02/26/22 1100   Vital Signs   Temp 97.5 °F (36.4 °C)   Temp Source Oral   Pulse 72   Heart Rate Source Monitor   Resp 19   BP (!) 162/75   MAP (mmHg) 103   Level of Consciousness Alert (0)   MEWS Score 1   Oxygen Therapy   SpO2 97 %   O2 Device None (Room air)   pt helped up to recliner w/ assist of RN, tolerated fairly well. Eating lunch and watching TV. Educated on pulmonary toileting, able to demonstrate to RN, ICU monitoring in place.

## 2022-02-26 NOTE — PROGRESS NOTES
Blood pressure (!) 155/86, pulse 82, temperature 98.4 °F (36.9 °C), temperature source Oral, resp. rate 16, weight 194 lb 1.6 oz (88 kg), SpO2 95 %, not currently breastfeeding. Shift assessment complete. See doc flow. Nightly medications given see MAR. Patient with no complaints at this time. Patient denies coughing. Lung sounds diminished. Abdomen soft non tender. BS + x 4 quads. No swelling noted. IV fluid complete. Patient will use call light in advance of needs and wait for staff prior to getting out of bed. Call light and bedside table within easy reach.      Electronically signed by Minnie Mora RN on 2/25/2022 at 8:33 PM

## 2022-02-26 NOTE — PROGRESS NOTES
Patient resting in bed with eyes closed at this time. Refuses a bath at this time. Blood sugar checked with reading of 110. Call light in reach.      Electronically signed by Kamla Moreira RN on 2/26/2022 at 12:26 AM

## 2022-02-26 NOTE — PROGRESS NOTES
Dr. Sade Mart @ bedside assessing pt for interdisciplinary rounds. All labs, lines, tubes, assessment and  POC, reviewed. Per MD HANNAH to be prn, MD to sign off. See new orders.

## 2022-02-26 NOTE — PROGRESS NOTES
[facility] notes  [phone]      Chief Complaint   Patient presents with    Shortness of Breath     In outpatient getting an ECHO for progressive SOB. Rapid response was called due to respiratory distress. Study had just started nd             History of Present Illness:  Jackie Jenkins is a 81 y.o. patient who presented to the hospital with complaints of shortness of breath. I have been asked to provide consultation regarding further management and testing. Subjective: no acute events overnight. No chest pain or pressure. No nausea or vomiting  Weight 195 lbs, net negative 500 ml. She reports that she sat up in the chair yesterday. Past Medical History:   has a past medical history of Hypertension, Mixed hyperlipidemia, and Type II or unspecified type diabetes mellitus without mention of complication, not stated as uncontrolled. Surgical History:   has a past surgical history that includes lipoma resection and knee surgery (Right, 11/22/2016). Social History:   reports that she quit smoking about 38 years ago. She has never used smokeless tobacco. She reports that she does not drink alcohol and does not use drugs. Family History:  No family history of premature coronary artery disease, aortic disease, or valve disease. Home Medications:  Were reviewed and are listed in nursing record. and/or listed below  Prior to Admission medications    Medication Sig Start Date End Date Taking?  Authorizing Provider   amLODIPine (NORVASC) 5 MG tablet Take 5 mg by mouth daily   Yes Historical Provider, MD   lisinopril-hydroCHLOROthiazide (PRINZIDE;ZESTORETIC) 10-12.5 MG per tablet Take 1 tablet by mouth 2 times daily   Yes Historical Provider, MD   allopurinol (ZYLOPRIM) 100 MG tablet Take 100 mg by mouth daily   Yes Historical Provider, MD   furosemide (LASIX) 20 MG tablet Take 1 tablet by mouth every other day 2/18/22   Jackie Morgan, APRN - CNP   ACCU-CHEK JERRY PLUS strip USE TO TEST BLOOD SUGAR ONCE DAILY 1/6/22   Tara Bradley, APRN - CNP   Handicap Placard [facility] 11/9/21-11/8/26 11/9/21   Barak King MD   metFORMIN (GLUCOPHAGE) 500 MG tablet TAKE 1 TABLET EVERY DAY 9/16/21   Lazarus Crumbly, APRN - CNP   Multiple Vitamins-Minerals (CENTRUM ADULTS PO) Take by mouth    Historical Provider, MD   Glucose Blood (BLOOD GLUCOSE TEST STRIPS) STRP 1 strip by Does not apply route three times daily Patient requesting True Results brand 5/3/18   Gilbert Santos DO   glucose monitoring kit (FREESTYLE) monitoring kit 1 kit by Does not apply route daily as needed (not prn) Dx E11.9-uses once daily-needs Acucheck Daphne Plus 9/7/16   Gilbert Santos DO   aspirin 81 MG EC tablet Take 81 mg by mouth daily.     Indications: OTC    Historical Provider, MD        Current Medications:  Current Facility-Administered Medications   Medication Dose Route Frequency Provider Last Rate Last Admin    mupirocin (BACTROBAN) 2 % ointment   Nasal BID Dmtiriy Chance MD   Given at 02/25/22 2009    carvedilol (COREG) tablet 3.125 mg  3.125 mg Oral BID  Dmitriy Chance MD   3.125 mg at 02/25/22 1655    enoxaparin (LOVENOX) injection 40 mg  40 mg SubCUTAneous Daily Dmitriy Chance MD   40 mg at 02/25/22 0841    aspirin EC tablet 81 mg  81 mg Oral Daily JAMES Griffith   81 mg at 02/25/22 0841    glucose (GLUTOSE) 40 % oral gel 15 g  15 g Oral PRN JAMES Ritter        glucagon (rDNA) injection 1 mg  1 mg IntraMUSCular PRN JAMES Ritter        dextrose 5 % solution  100 mL/hr IntraVENous PRN JAMES Ritter        sodium chloride flush 0.9 % injection 5-40 mL  5-40 mL IntraVENous 2 times per day Dia Tan PA   10 mL at 02/25/22 2006    sodium chloride flush 0.9 % injection 10 mL  10 mL IntraVENous PRN JAMES Ritter        0.9 % sodium chloride infusion  25 mL IntraVENous PRN JAMES Ritter        ondansetron (ZOFRAN-ODT) disintegrating tablet 4 mg  4 mg Oral Q8H PRN Ha Rizo diplopia, change in muscle strength, numbness or tingling. No change in gait, balance, coordination, mood, affect, memory, mentation, behavior. · Psychiatric: No anxiety, no depression. · Endocrine: No malaise, fatigue or temperature intolerance. No excessive thirst, fluid intake, or urination. No tremor. · Hematologic/Lymphatic: No abnormal bruising or bleeding, blood clots or swollen lymph nodes. · Allergic/Immunologic: No nasal congestion or hives.   ·     Physical Examination:    Vitals:    02/26/22 0600   BP: (!) 151/89   Pulse: 89   Resp: 19   Temp:    SpO2: 90%    Weight: 195 lb 9.6 oz (88.7 kg)         General Appearance:  Alert, cooperative, no distress, appears stated age   Head:  Normocephalic, without obvious abnormality, atraumatic   Eyes:  PERRL, conjunctiva/corneas clear       Nose: Nares normal, no drainage or sinus tenderness   Throat: Lips, mucosa, and tongue normal   Neck: Supple, symmetrical, trachea midline, no adenopathy, thyroid: not enlarged, symmetric, no tenderness/mass/nodules, no carotid bruit or JVD       Lungs:   Crackles+    Chest Wall:  No tenderness or deformity   Heart:  Regular rate and rhythm, S1, S2 normal, no murmur, rub or gallop   Abdomen:   Soft, non-tender, bowel sounds active all four quadrants,  no masses, no organomegaly           Extremities: Extremities normal, atraumatic, no cyanosis or edema   Pulses: 2+ and symmetric   Skin: Skin color, texture, turgor normal, no rashes or lesions   Pysch: Normal mood and affect   Neurologic: Normal gross motor and sensory exam.         Labs  CBC:   Lab Results   Component Value Date    WBC 8.1 02/24/2022    RBC 4.86 02/24/2022    HGB 13.3 02/24/2022    HCT 40.2 02/24/2022    MCV 82.7 02/24/2022    RDW 14.8 02/24/2022     02/24/2022     CMP:    Lab Results   Component Value Date     02/26/2022    K 4.3 02/26/2022    K 4.5 02/23/2022     02/26/2022    CO2 30 02/26/2022    BUN 34 02/26/2022    CREATININE 1.2 02/26/2022    GFRAA 52 02/26/2022    GFRAA >60 05/08/2013    AGRATIO 1.4 02/23/2022    LABGLOM 43 02/26/2022    GLUCOSE 100 02/26/2022    PROT 7.7 02/23/2022    PROT 7.2 05/03/2012    CALCIUM 10.9 02/26/2022    BILITOT 0.3 02/23/2022    ALKPHOS 99 02/23/2022    AST 29 02/23/2022    ALT 22 02/23/2022     PT/INR:  No results found for: PTINR  Lab Results   Component Value Date    CKTOTAL 34 10/17/2013    TROPONINI <0.01 02/23/2022       EKG:  I have reviewed EKG with the following interpretation:  Impression: sinus tachycardia     Echo:  LV systolic function is reduced with ejection fraction estimated at 35%. There is mild global hypokinesis. There is mild concentric left ventricular hypertrophy. Elevated left ventricular diastolic filling pressure. The right atrium is mildly dilated. Mild posterior mitral annular calcification is present. Aortic valve appears sclerotic but opens adequately. Moderate mitral regurgitation. Mild aortic, tricuspid, and pulmonic regurgitation is present. Systolic pulmonary artery pressure (SPAP) estimated at 76 mmHg (RA pressure   8 mmHg), consistent with severe pulmonary hypertension. There is a trivial circumferential pericardial effusion noted.    11/21/2016 EF greater than 70%Dynamic outflow tract obstruction, LVH, DD1,   posterior MAC  Stress: none  Cath: none  MRI/EP/Other: none    Old notes reviewed  Telemetry reviewd  Ekg personally reviewed  Chest xray personally reviewed  Echo, cath, and   Medications and labs reviewed  moderate complexity/medical decision making due to extensive data review, extensive history review, independent review of data  moderate risk due to acute illness, evaluation of drug-drug interactions, medication management and diagnostic interventions    Assessment  Patient Active Problem List   Diagnosis    Osteoarthritis    Mixed hyperlipidemia    Essential hypertension    [facility]    Vitamin D deficiency    Stress incontinence    Hypercalcemia    Shoulder impingement    Bilateral adhesive capsulitis of shoulders    Ruptured Bakers cyst    Tear of medial meniscus of right knee, current    Hyperuricemia    Acute respiratory failure with hypoxia ([address]    Cardiomyopathy ([address]    SOB (shortness of breath)    COVID-19    Acute respiratory failure with hypoxia and hypercapnia (HCC)         Plan:    I had the opportunity to review the clinical symptoms and presentation of Ayanna Hallman. Assessment/Plan:  1. Covid 19 pneumonia  - new problem  Plan:  - continue steroids    2. New onset cardiomyopathy  - differential: valvular, covid, ischemia  Plan:  - increase coreg 6.25 mg bid.  - not on ace/arb/arni due to sonja  - start spironolactone 25 mg at lunch  - continue aspirin and statin   - will need further ischemia workup    3. Mixed hyperlipidemia  -stable  Plan:  -continue statin     4. Moderate-severe MR    5.  Diabetes      Emmett 52, [id number] 7:33 AM

## 2022-02-27 LAB
ANION GAP SERPL CALCULATED.3IONS-SCNC: 7 MMOL/L (ref 3–16)
BASOPHILS ABSOLUTE: 0 K/UL (ref 0–0.2)
BASOPHILS RELATIVE PERCENT: 0.4 %
BUN BLDV-MCNC: 34 MG/DL (ref 7–20)
CALCIUM SERPL-MCNC: 10.5 MG/DL (ref 8.3–10.6)
CHLORIDE BLD-SCNC: 100 MMOL/L (ref 99–110)
CO2: 30 MMOL/L (ref 21–32)
CREAT SERPL-MCNC: 1.1 MG/DL (ref 0.6–1.2)
EOSINOPHILS ABSOLUTE: 0.4 K/UL (ref 0–0.6)
EOSINOPHILS RELATIVE PERCENT: 5.1 %
GFR AFRICAN AMERICAN: 57
GFR NON-AFRICAN AMERICAN: 47
GLUCOSE BLD-MCNC: 105 MG/DL (ref 70–99)
GLUCOSE BLD-MCNC: 114 MG/DL (ref 70–99)
GLUCOSE BLD-MCNC: 116 MG/DL (ref 70–99)
GLUCOSE BLD-MCNC: 128 MG/DL (ref 70–99)
GLUCOSE BLD-MCNC: 143 MG/DL (ref 70–99)
GLUCOSE BLD-MCNC: 163 MG/DL (ref 70–99)
HCT VFR BLD CALC: 40.4 % (ref 36–48)
HEMOGLOBIN: 13.1 G/DL (ref 12–16)
LYMPHOCYTES ABSOLUTE: 1.7 K/UL (ref 1–5.1)
LYMPHOCYTES RELATIVE PERCENT: 20.3 %
MAGNESIUM: 2 MG/DL (ref 1.8–2.4)
MCH RBC QN AUTO: 27 PG (ref 26–34)
MCHC RBC AUTO-ENTMCNC: 32.5 G/DL (ref 31–36)
MCV RBC AUTO: 83.1 FL (ref 80–100)
MONOCYTES ABSOLUTE: 0.7 K/UL (ref 0–1.3)
MONOCYTES RELATIVE PERCENT: 8.1 %
NEUTROPHILS ABSOLUTE: 5.5 K/UL (ref 1.7–7.7)
NEUTROPHILS RELATIVE PERCENT: 66.1 %
PDW BLD-RTO: 15.2 % (ref 12.4–15.4)
PERFORMED ON: ABNORMAL
PLATELET # BLD: 261 K/UL (ref 135–450)
PMV BLD AUTO: 8.7 FL (ref 5–10.5)
POTASSIUM SERPL-SCNC: 4.4 MMOL/L (ref 3.5–5.1)
PRO-BNP: 9211 PG/ML (ref 0–449)
RBC # BLD: 4.86 M/UL (ref 4–5.2)
SODIUM BLD-SCNC: 137 MMOL/L (ref 136–145)
T4 FREE: 1.1 NG/DL (ref 0.9–1.8)
TSH REFLEX FT4: 4.89 UIU/ML (ref 0.27–4.2)
WBC # BLD: 8.4 K/UL (ref 4–11)

## 2022-02-27 PROCEDURE — 6370000000 HC RX 637 (ALT 250 FOR IP): Performed by: INTERNAL MEDICINE

## 2022-02-27 PROCEDURE — 83735 ASSAY OF MAGNESIUM: CPT

## 2022-02-27 PROCEDURE — 94761 N-INVAS EAR/PLS OXIMETRY MLT: CPT

## 2022-02-27 PROCEDURE — 99233 SBSQ HOSP IP/OBS HIGH 50: CPT | Performed by: INTERNAL MEDICINE

## 2022-02-27 PROCEDURE — 2060000000 HC ICU INTERMEDIATE R&B

## 2022-02-27 PROCEDURE — 83880 ASSAY OF NATRIURETIC PEPTIDE: CPT

## 2022-02-27 PROCEDURE — 80048 BASIC METABOLIC PNL TOTAL CA: CPT

## 2022-02-27 PROCEDURE — 36415 COLL VENOUS BLD VENIPUNCTURE: CPT

## 2022-02-27 PROCEDURE — 97530 THERAPEUTIC ACTIVITIES: CPT

## 2022-02-27 PROCEDURE — 85025 COMPLETE CBC W/AUTO DIFF WBC: CPT

## 2022-02-27 PROCEDURE — 97116 GAIT TRAINING THERAPY: CPT

## 2022-02-27 PROCEDURE — 6360000002 HC RX W HCPCS: Performed by: INTERNAL MEDICINE

## 2022-02-27 PROCEDURE — 6370000000 HC RX 637 (ALT 250 FOR IP)

## 2022-02-27 PROCEDURE — 2580000003 HC RX 258

## 2022-02-27 PROCEDURE — 84439 ASSAY OF FREE THYROXINE: CPT

## 2022-02-27 PROCEDURE — 99231 SBSQ HOSP IP/OBS SF/LOW 25: CPT | Performed by: INTERNAL MEDICINE

## 2022-02-27 PROCEDURE — 84443 ASSAY THYROID STIM HORMONE: CPT

## 2022-02-27 PROCEDURE — 99232 SBSQ HOSP IP/OBS MODERATE 35: CPT | Performed by: INTERNAL MEDICINE

## 2022-02-27 RX ORDER — TORSEMIDE 20 MG/1
20 TABLET ORAL DAILY
Status: DISCONTINUED | OUTPATIENT
Start: 2022-02-27 | End: 2022-03-02 | Stop reason: HOSPADM

## 2022-02-27 RX ORDER — BISACODYL 10 MG
10 SUPPOSITORY, RECTAL RECTAL DAILY PRN
Status: DISCONTINUED | OUTPATIENT
Start: 2022-02-27 | End: 2022-03-02 | Stop reason: HOSPADM

## 2022-02-27 RX ORDER — CARVEDILOL 6.25 MG/1
6.25 TABLET ORAL 2 TIMES DAILY WITH MEALS
Status: DISCONTINUED | OUTPATIENT
Start: 2022-02-27 | End: 2022-03-02 | Stop reason: HOSPADM

## 2022-02-27 RX ADMIN — DOCUSATE SODIUM 100 MG: 100 CAPSULE ORAL at 10:05

## 2022-02-27 RX ADMIN — SODIUM CHLORIDE, PRESERVATIVE FREE 10 ML: 5 INJECTION INTRAVENOUS at 10:06

## 2022-02-27 RX ADMIN — SPIRONOLACTONE 25 MG: 25 TABLET ORAL at 12:05

## 2022-02-27 RX ADMIN — BISACODYL 10 MG: 10 SUPPOSITORY RECTAL at 10:45

## 2022-02-27 RX ADMIN — OXYCODONE HYDROCHLORIDE AND ACETAMINOPHEN 500 MG: 500 TABLET ORAL at 21:23

## 2022-02-27 RX ADMIN — ASPIRIN 81 MG: 81 TABLET, COATED ORAL at 10:05

## 2022-02-27 RX ADMIN — Medication 2000 UNITS: at 10:05

## 2022-02-27 RX ADMIN — TORSEMIDE 20 MG: 20 TABLET ORAL at 10:05

## 2022-02-27 RX ADMIN — MUPIROCIN: 20 OINTMENT TOPICAL at 21:23

## 2022-02-27 RX ADMIN — INSULIN LISPRO 2 UNITS: 100 INJECTION, SOLUTION INTRAVENOUS; SUBCUTANEOUS at 12:06

## 2022-02-27 RX ADMIN — INSULIN LISPRO 2 UNITS: 100 INJECTION, SOLUTION INTRAVENOUS; SUBCUTANEOUS at 21:23

## 2022-02-27 RX ADMIN — ENOXAPARIN SODIUM 40 MG: 100 INJECTION SUBCUTANEOUS at 10:05

## 2022-02-27 RX ADMIN — OXYCODONE HYDROCHLORIDE AND ACETAMINOPHEN 500 MG: 500 TABLET ORAL at 10:05

## 2022-02-27 RX ADMIN — SODIUM CHLORIDE, PRESERVATIVE FREE 10 ML: 5 INJECTION INTRAVENOUS at 21:23

## 2022-02-27 RX ADMIN — CARVEDILOL 6.25 MG: 6.25 TABLET, FILM COATED ORAL at 18:16

## 2022-02-27 RX ADMIN — CARVEDILOL 6.25 MG: 6.25 TABLET, FILM COATED ORAL at 10:05

## 2022-02-27 RX ADMIN — POLYETHYLENE GLYCOL 3350 17 G: 17 POWDER, FOR SOLUTION ORAL at 10:05

## 2022-02-27 ASSESSMENT — PAIN SCALES - WONG BAKER

## 2022-02-27 ASSESSMENT — PAIN SCALES - GENERAL
PAINLEVEL_OUTOF10: 0
PAINLEVEL_OUTOF10: 0

## 2022-02-27 NOTE — PROGRESS NOTES
Call back from Dr. Robert Rodnye who stated \"I don't think she should go\". Will make Dr. Debra Gunter aware.

## 2022-02-27 NOTE — PROGRESS NOTES
Bedside report to [facility] RN POC reviewed, will transfer care.

## 2022-02-27 NOTE — PROGRESS NOTES
Rounds completed with Dr. Portia Lanza. Per MD, pt may move out of ICU, dulcolax suppository prn, let Dr. Chad Cruz with cardiology know that pt can go home, and give pt 02 test to check for home 02 needs.

## 2022-02-27 NOTE — PROGRESS NOTES
Pt required assistance back to bed with use of steady. Was very weak and was not able to move or  feet. Tolerated use of steady. 2L of 02 used while getting pt back to bed. No drop < 90% while getting back into bed with use of 02.  02 removed once back in the bed. All needs and call light within reach.

## 2022-02-27 NOTE — PROGRESS NOTES
Pt turning in bed and Sp02 dropped to low 80s on RA. Pt able to recover but was short of breath with activity. While having 02 on at 2L, Sp02 maintained >90%.   Currently on RA, at rest.

## 2022-02-27 NOTE — PROGRESS NOTES
Patient sleeping well at this time. O2 saturation dropping to 88% on RA. Put patient on 1.5L O2 via NC at this time. O2 saturation now 93%. Will continue to monitor.      Electronically signed by Kamla Moreira RN on 2/26/2022 at 11:55 PM

## 2022-02-27 NOTE — PROGRESS NOTES
IM Progress Note    Admit Date:  2/23/2022    Patient admitted with acute hypoxic respiratory failure, COVID-19 pneumonia. vaccinated patient. Initially admitted to ICU, required BiPAP. O2 sats have improved now. patient can be transferred to PCU. Subjective:  Ms. Herman Comment is stable today remains off o2 . Able to sit up in chair  Had BM yesterday     Cards staring on demadex    She has new cardiomyopathy and IV Lasix given for possible CHF on admission. Currently she is appears compensated, Lasix has been held due to elevated creatinine. Objective:   BP (!) 155/92   Pulse 73   Temp 98.1 °F (36.7 °C) (Oral)   Resp 17   Wt 202 lb 14.4 oz (92 kg)   SpO2 92%   Breastfeeding No   BMI 35.94 kg/m²       Intake/Output Summary (Last 24 hours) at 2/27/2022 0805  Last data filed at 2/27/2022 4547  Gross per 24 hour   Intake 620 ml   Output 650 ml   Net -30 ml         Physical Exam:   Patient seen in droplet plus precautions  General:  Obese elderly female up in bed  Awake, alert, NAD  Skin:  Warm and dry  Neck:  JVD absent. Neck supple  Chest:  Clear to auscultation, respiration easy. No wheezes, rales or rhonchi. Cardiovascular:  RRR ,S1S2 normal  Abdomen:  Soft, non tender, non distended, BS +  Extremities:  Trace benja LE edema resolved  Intact peripheral pulses.  Brisk cap refill, < 2 secs  Neuro: non focal      Medications:   Scheduled Meds:   spironolactone  25 mg Oral Lunch    docusate sodium  100 mg Oral Daily    polyethylene glycol  17 g Oral Daily    mupirocin   Nasal BID    carvedilol  3.125 mg Oral BID WC    enoxaparin  40 mg SubCUTAneous Daily    aspirin  81 mg Oral Daily    sodium chloride flush  5-40 mL IntraVENous 2 times per day    insulin lispro  0-12 Units SubCUTAneous Q4H    Vitamin D  2,000 Units Oral Daily    ascorbic acid  500 mg Oral BID       Continuous Infusions:   dextrose      sodium chloride         Data:  CBC:   Recent Labs     02/26/22  0330 02/27/22  0433   WBC 9.1 8.4   RBC 4.78 4.86   HGB 13.0 13.1   HCT 40.0 40.4   MCV 83.6 83.1   RDW 14.8 15.2    261     BMP:   Recent Labs     02/25/22  0443 02/26/22  0330 02/27/22  0433    141 137   K 3.9 4.3 4.4   CL 98* 101 100   CO2 32 30 30   BUN 36* 34* 34*   CREATININE 1.4* 1.2 1.1       Cultures  COVID PCR done twice. One is positive and one is negative. Influenza A and B not detected      Radiology  CT CHEST PULMONARY EMBOLISM W CONTRAST   Final Result   No evidence of pulmonary embolism. COVID-19 pneumonia as above. XR CHEST PORTABLE   Final Result   Findings most consistent with CHF with vascular congestion and interstitial   edema. ECHO      Summary   LV systolic function is reduced with ejection fraction estimated at 35%. There is mild global hypokinesis. There is mild concentric left ventricular hypertrophy. Elevated left ventricular diastolic filling pressure. The right atrium is mildly dilated. Mild posterior mitral annular calcification is present. Aortic valve appears sclerotic but opens adequately. Moderate mitral regurgitation. Mild aortic, tricuspid, and pulmonic regurgitation is present. Systolic pulmonary artery pressure (SPAP) estimated at 76 mmHg (RA pressure   8 mmHg), consistent with severe pulmonary hypertension. There is a trivial circumferential pericardial effusion noted. 11/21/2016 EF greater than 70%Dynamic outflow tract obstruction, LVH, DD1,   posterior MAC       Assessment:  Principal Problem:    Acute respiratory failure with hypoxia (HCC)  Active Problems:    Cardiomyopathy (HCC)    SOB (shortness of breath)    COVID-19    Acute respiratory failure with hypoxia and hypercapnia (HCC)    Acute systolic CHF (congestive heart failure) (HCC)  Resolved Problems:    * No resolved hospital problems. *      Plan:    # Acute respiratory failure with hypoxia and hypercapnia  -Secondary to COVID-19 pneumonia, CHF  -Admitted to ICU, s/p BiPAP.   She has improved now. Oxygen saturation stable on 3 L-> RA. Can transfer  to PCU      #COVID-19 pneumonia  -Vaccinated patient  -Covid testing done twice. One was positive and one was negative. Both were PCR studies   - CT chest did show changes consistent with Covid pneumonia, no PE.  -Oxygen saturation stable keep on droplet plus precautions for now  -Continue Decadron, day #4  -Inhaled bronchodilators    #Acute systolic CHF  #New cardiomyopathy  #Severe pulmonary hypertension  -Patient with shortness of breath for several months, she had outpatient echocardiogram done -> sent to the ER from echo department for increased shortness of breath.  -Echo shows decreased ejection fraction 35% with pulmonary hypertension.  -Cardiology consulted  -Patient got IV Lasix on admit. creatinine trending up  Further doses of Lasix held. Hold ACE inhibitor's. Coreg added. Starting demadex now. Plans for entresto in am    Monitor renal function. # DM-2  -On sliding scale insulin  - repeat hemoglobin A1c 6.4%    # Hypercalcemia  - monitor with diuresis  Calcium levels have improved now.  11.1-> 10.6     #Obesity     DVT Prophylaxis: Lovenox  ADULT DIET;  Regular; Low Fat/Low Chol/High Fiber/2 gm Na  Code Status: Full Code    Transfer to PCU      Vanita Mcmahon MD, MD 2/27/2022 8:05 AM No

## 2022-02-27 NOTE — PROGRESS NOTES
Daughter Uziel Mauricio called for update. Daughter also concerned that pt sounded like she was slurring her speech when she was talking to her over the phone. RN in to assess pt. No slurring of speech at all. Pt completely A+O x4. Able to move all extremities without any significant differences from one side to the other. Pt denies needs at this time. All needs and call light within reach.

## 2022-02-27 NOTE — PROGRESS NOTES
AM assessment completed, see flow sheet. Pt is alert and oriented. Vital signs are WNL. Respirations are even & easy. Pt placed on RA and tolerating well at rest. No complaints voiced. Pt denies needs at this time. SR up x 2, and bed in low position. Call light is within reach.

## 2022-02-27 NOTE — PROGRESS NOTES
Pulmonary & Critical Care Medicine ICU Progress Note    CC: Orthopnea, hypoxemia    Events of Last 24 hours:   Mild desaturations with sleep    Vascular lines: IV: Peripheral    MV: None     / / /FiO2 : 50 %  Recent Labs     22  0745   PHART 7.447   [id number] 49.9*   PO2ART 57.4*       IV:   dextrose      sodium chloride         Vitals:  Blood pressure (!) 155/92, pulse 71, temperature 98.1 °F (36.7 °C), temperature source Oral, resp. rate 16, weight 202 lb 14.4 oz (92 kg), SpO2 96 %, not currently breastfeeding. on 1.5 L with sleep   Temp  Av °F (36.7 °C)  Min: 97.5 °F (36.4 °C)  Max: 98.3 °F (36.8 °C)    Intake/Output Summary (Last 24 hours) at 2022 0708  Last data filed at 2022 0616  Gross per 24 hour   Intake 620 ml   Output 650 ml   Net -30 ml     PE:  Constitutional:  No acute distress. HENT:  Oropharynx is clear and moist.   Neck: No tracheal deviation present. Cardiovascular: Normal heart sounds. No lower extremity edema. Pulmonary/Chest: No wheezes. No rhonchi. No rales. No decreased breath sounds. No accessory muscle usage or stridor. Musculoskeletal: No cyanosis. No clubbing. Skin: Skin is warm and dry. Psychiatric: Normal mood and affect.   Neurologic: speech fluent, alert and oriented, strength symmetric        Scheduled Meds:   spironolactone  25 mg Oral Lunch    docusate sodium  100 mg Oral Daily    polyethylene glycol  17 g Oral Daily    mupirocin   Nasal BID    carvedilol  3.125 mg Oral BID     enoxaparin  40 mg SubCUTAneous Daily    aspirin  81 mg Oral Daily    sodium chloride flush  5-40 mL IntraVENous 2 times per day    insulin lispro  0-12 Units SubCUTAneous Q4H    Vitamin D  2,000 Units Oral Daily    ascorbic acid  500 mg Oral BID       Data:  CBC:   Recent Labs     22  0330 223   WBC 9.1 8.4   HGB 13.0 13.1   HCT 40.0 40.4   MCV 83.6 83.1    261     BMP:   Recent Labs     22  0443 22  0330 22  0433   NA 139 141 137   K 3.9 4.3 4.4   CL 98* 101 100   CO2 32 30 30   BUN 36* 34* 34*   CREATININE 1.4* 1.2 1.1     LIVER PROFILE:   No results for input(s): AST, ALT, LIPASE, BILIDIR, BILITOT, ALKPHOS in the last 72 hours. Invalid input(s): AMYLASE,  ALB    Micro:  2/23/2022 SARS-CoV-2 positive, influenza negative  2/23/2022 SARS-CoV-2 negative, influenza negative    Imaging:  CTPA 2/23/2022 personally reviewed  Pulmonary Arteries: Pulmonary arteries are adequately opacified for   evaluation.  No evidence of intraluminal filling defect to suggest pulmonary   embolism.  Main pulmonary artery is normal in caliber. Mediastinum: No evidence of mediastinal lymphadenopathy.  The heart and   pericardium demonstrate no acute abnormality.  There is no acute abnormality   of the thoracic aorta. Lungs/pleura: Bilateral ground-glass opacities, right greater than left, with   bilateral lower lobe nodular consolidations, highly suggestive of viral   pneumonia as seen with COVID-19.  Trace bilateral pleural effusions. Upper Abdomen: Limited images of the upper abdomen are unremarkable. Soft Tissues/Bones:  Age related degenerative changes of the visualized   osseous structures without focal destructive lesion.  Visualized soft tissues   are unremarkable.       Impression:       No evidence of pulmonary embolism. COVID-19 pneumonia as above.     -- ground glass is mild, UL predominant with RLL focal consolidation/atelectasis    ECHO 2/23/22   EF 35% (prior ECHO in 2016 with EF 70%)  Global hypokinesis  Elevated LVEDP  SPAP 76    ASSESSMENT:  · COVID-19 infection in a fully vaccinated patient  · Acute hypoxemic and hypercapnic respiratory failure 2/2 CHF  · Acute systolic heart failure  · Probable severe pulmonary hypertension (by Echo)  · Morbid obesity  · Mild JANAE  · DM    PLAN:   COVID-19 isolation, droplet plus   Holding lasix, patient has matched I/O's off lasix   Heart failure drugs per Dr. Javier Espino regimen    Lovenox prophylaxis    Transferred to PCU care yesterday.   I will not follow once she moves out of the ICU

## 2022-02-27 NOTE — PLAN OF CARE
Problem: Skin Integrity:  Goal: Will show no infection signs and symptoms  Description: Will show no infection signs and symptoms  2/26/2022 1020 by Janine Jackson RN  Outcome: Ongoing     Problem: SAFETY  Goal: Free from accidental physical injury  2/26/2022 1020 by Janine Jackson RN  Outcome: Ongoing     Problem: DAILY CARE  Goal: Daily care needs are met  2/26/2022 1020 by Janine Jackson RN  Outcome: Ongoing

## 2022-02-27 NOTE — PROGRESS NOTES
02/26/22 2020    sodium chloride flush 0.9 % injection 10 mL  10 mL IntraVENous PRN JAMES Gonzáles        0.9 % sodium chloride infusion  25 mL IntraVENous PRN JAMES Gonzáles        ondansetron (ZOFRAN-ODT) disintegrating tablet 4 mg  4 mg Oral Q8H PRN JAMES Gonzáles        Or    ondansetron (ZOFRAN) injection 4 mg  4 mg IntraVENous Q6H PRN JAMES Gonzáles        potassium chloride (KLOR-CON M) extended release tablet 40 mEq  40 mEq Oral PRN JAMES Gonzáles        Or    potassium bicarb-citric acid (EFFER-K) effervescent tablet 40 mEq  40 mEq Oral PRN JAMES Gonzáles        Or    potassium chloride 10 mEq/100 mL IVPB (Peripheral Line)  10 mEq IntraVENous PRN JAMES Gonzáles        magnesium sulfate 1000 mg in dextrose 5% 100 mL IVPB  1,000 mg IntraVENous PRN JAMES Gonzáles        insulin lispro (HUMALOG) injection vial 0-12 Units  0-12 Units SubCUTAneous Q4H JAMES Gonzáles   2 Units at 02/26/22 1112    Vitamin D (CHOLECALCIFEROL) tablet 2,000 Units  2,000 Units Oral Daily JAMES Gonzáles   2,000 Units at 02/26/22 2890    ascorbic acid (VITAMIN C) tablet 500 mg  500 mg Oral BID JAMES Gonzáles   500 mg at 02/26/22 2020    dextrose bolus (hypoglycemia) 10% 125 mL  125 mL IntraVENous PRN JAMES Gonzáles        Or    dextrose bolus (hypoglycemia) 10% 250 mL  250 mL IntraVENous PRN JAMES Gonzáles            Allergies:  Pcn [penicillins], Statins, and Ibuprofen     Review of Systems:     · Constitutional: there has been no unanticipated weight loss. +fatigue    · Eyes: No visual changes or diplopia. No scleral icterus. · ENT: No Headaches, hearing loss or vertigo. No mouth sores or sore throat. · Cardiovascular: Reviewed in HPI.+ shortness of breath  · Respiratory: No cough or wheezing, no sputum production. No hematemesis. · Gastrointestinal: No abdominal pain, appetite loss, blood in stools. No change in bowel or bladder habits.   · Genitourinary: No dysuria, trouble voiding, or hematuria. · Musculoskeletal:  No gait disturbance, weakness or joint complaints. · Integumentary: No rash or pruritis. · Neurological: No headache, diplopia, change in muscle strength, numbness or tingling. No change in gait, balance, coordination, mood, affect, memory, mentation, behavior. · Psychiatric: No anxiety, no depression. · Endocrine: No malaise, fatigue or temperature intolerance. No excessive thirst, fluid intake, or urination. No tremor. · Hematologic/Lymphatic: No abnormal bruising or bleeding, blood clots or swollen lymph nodes. · Allergic/Immunologic: No nasal congestion or hives.   ·     Physical Examination:    Vitals:    02/27/22 0800   BP:    Pulse: 69   Resp: 18   Temp:    SpO2: 95%    Weight: 202 lb 14.4 oz (92 kg)         General Appearance:  Alert, cooperative, no distress, appears stated age   Head:  Normocephalic, without obvious abnormality, atraumatic   Eyes:  PERRL, conjunctiva/corneas clear       Nose: Nares normal, no drainage or sinus tenderness   Throat: Lips, mucosa, and tongue normal   Neck: Supple, symmetrical, trachea midline, no adenopathy, thyroid: not enlarged, symmetric, no tenderness/mass/nodules, no carotid bruit or JVD       Lungs:   Crackles+    Chest Wall:  No tenderness or deformity   Heart:  Regular rate and rhythm, S1, S2 normal, no murmur, rub or gallop   Abdomen:   Soft, non-tender, bowel sounds active all four quadrants,  no masses, no organomegaly           Extremities: Extremities normal, atraumatic, no cyanosis or edema   Pulses: 2+ and symmetric   Skin: Skin color, texture, turgor normal, no rashes or lesions   Pysch: Normal mood and affect   Neurologic: Normal gross motor and sensory exam.         Labs  CBC:   Lab Results   Component Value Date    WBC 8.4 02/27/2022    RBC 4.86 02/27/2022    HGB 13.1 02/27/2022    HCT 40.4 02/27/2022    MCV 83.1 02/27/2022    RDW 15.2 02/27/2022     02/27/2022     CMP:    Lab Results Component Value Date     02/27/2022    K 4.4 02/27/2022    K 4.5 02/23/2022     02/27/2022    CO2 30 02/27/2022    BUN 34 02/27/2022    CREATININE 1.1 02/27/2022    GFRAA 57 02/27/2022    GFRAA >60 05/08/2013    AGRATIO 1.4 02/23/2022    LABGLOM 47 02/27/2022    GLUCOSE 116 02/27/2022    PROT 7.7 02/23/2022    PROT 7.2 05/03/2012    CALCIUM 10.5 02/27/2022    BILITOT 0.3 02/23/2022    ALKPHOS 99 02/23/2022    AST 29 02/23/2022    ALT 22 02/23/2022     PT/INR:  No results found for: PTINR  Lab Results   Component Value Date    CKTOTAL 34 10/17/2013    TROPONINI <0.01 02/23/2022       EKG:  I have reviewed EKG with the following interpretation:  Impression: sinus tachycardia     Echo:  LV systolic function is reduced with ejection fraction estimated at 35%. There is mild global hypokinesis. There is mild concentric left ventricular hypertrophy. Elevated left ventricular diastolic filling pressure. The right atrium is mildly dilated. Mild posterior mitral annular calcification is present. Aortic valve appears sclerotic but opens adequately. Moderate mitral regurgitation. Mild aortic, tricuspid, and pulmonic regurgitation is present. Systolic pulmonary artery pressure (SPAP) estimated at 76 mmHg (RA pressure   8 mmHg), consistent with severe pulmonary hypertension. There is a trivial circumferential pericardial effusion noted.    11/21/2016 EF greater than 70%Dynamic outflow tract obstruction, LVH, DD1,   posterior MAC  Stress: none  Cath: none  MRI/EP/Other: none    Old notes reviewed  Telemetry reviewd  Ekg personally reviewed  Chest xray personally reviewed  Echo, cath, and   Medications and labs reviewed  moderate complexity/medical decision making due to extensive data review, extensive history review, independent review of data  moderate risk due to acute illness, evaluation of drug-drug interactions, medication management and diagnostic interventions    Assessment  Patient Active Problem List   Diagnosis    Osteoarthritis    Mixed hyperlipidemia    Essential hypertension    Diabetes mellitus ([facility] [address]    Vitamin D deficiency    Stress incontinence    Hypercalcemia    Shoulder impingement    Bilateral adhesive capsulitis of shoulders    Ruptured Bakers cyst    Tear of medial meniscus of right knee, current    Hyperuricemia    Acute respiratory failure with hypoxia (HCC)    Cardiomyopathy ([facility] [address]    SOB (shortness of breath)    COVID-19    Acute respiratory failure with hypoxia and hypercapnia (HCC)    Acute systolic CHF (congestive heart failure) ([facility] [address]         Plan:    I had the opportunity to review the clinical symptoms and presentation of Caleb Ford. Assessment/Plan:  1. Covid 19 pneumonia  - new problem  Plan:  - continue steroids    2. New onset cardiomyopathy  - differential: valvular, covid, ischemia  Plan:  - increase coreg 6.25 mg bid and continue spironolactone 25 mg  - torsemide 20 mg po today given orthopnea and weigh gain   - not on ace/arb/arni due to sonja, will start entresto tomorrow  - continue aspirin  - will need further ischemia workup    3. Mixed hyperlipidemia  -stable  Plan:  -continue statin     4. Moderate-severe MR    5.  Diabetes      Elena Bazzi, [id number] 8:14 AM

## 2022-02-27 NOTE — PROGRESS NOTES
Inpatient Physical Therapy Daily Treatment Note    Unit: ICU  Date:  2/27/2022  Patient Name:    Ayanna Hallman  Admitting diagnosis:  Acute respiratory failure with hypoxia [facility]) [J96.01]  Acute respiratory failure with hypoxia and hypercapnia ([facility] [address] [J96.01, J96.02]  Admit Date:  2/23/2022  Precautions/Restrictions:  Fall risk, Bed/chair alarm, Lines -IV and Purewick catheter, [place] (hard of hearing), Telemetry, Continuous pulse oximetry, Isolation Precautions: Droplet Plus - COVID and ICU monitoring      Discharge Recommendations: SNF  DME needs for discharge: defer to facility       Therapy recommendation for EMS Transport: can transport by wheelchair    Therapy recommendations for staff:   Assist of 1 with use of rolling walker (RW) for all transfers and ambulation to/from [facility]  to/from chair    History of Present Illness: Per Dr. Mariam Byrne 2/23: \"36 y. o. female who presented to the hospital with a chief complaint of shortness of breath.  Very pleasant 27-year-old female who was get an outpatient echocardiogram obtained for worsening dyspnea on exertion and at rest.  During her procedure she developed shortness of breath and difficulty breathing.  A rapid response was called and she was taken to the emergency department for evaluation.  In the emergency department she was tachypneic, hypoxic and acidotic.  She was placed on BiPAP and was admitted to the ICU for further care.  An echocardiogram obtained revealed new onset systolic dysfunction which was not there in prior echocardiograms with severe pulmonary hypertension.  She was also positive for COVID-19.  Of note she has been fully vaccinated with a booster.  When I saw the patient she was awake, alert and not on BiPAP therapy.  She will be observed in the ICU overnight. \"   PMHx including not limited to:  HTN, HLD, and DM  Echo 2/23: EF=35%, pulm hypertension.      Home Health S4 Level Recommendation: NA  AM-PAC Mobility Score   AM-PAC Inpatient Mobility Raw Score : 17  AM-PAC Inpatient Mobility without Stair Climbing Raw Score : 16    Treatment Time:  3920-1897  Treatment number: 2  Timed Code Treatment Minutes: 24 minutes  Total Treatment Minutes:  24  minutes    Cognition    A&O x4   Able to follow 2 step commands    Subjective  Patient lying supine in bed with no family present   Pt agreeable to this PT tx with caution as pt reports recently receiving suppository. Pain   Yes  Location: lower abdomen - pt reports due to need to have BM  Rating:    mild  Pain Medicine Status: No request made     Bed Mobility   Supine to Sit:    Min A   Sit to Supine:   Not Tested  Rolling:   Modified Independent with use of bed rail  Scooting:   SBA    Transfer Training     Sit to stand:   CGA  Stand to sit:   CGA  Bed to Chair:   Min A  with use of unilateral HHA    Gait Training gait completed as indicated below  Distance:      10 ft  Deviations (firm surface/linoleum):  decreased kelvin and forward flexed posture  Assistive Device Used:    rolling walker (RW)  Level of Assist:    CGA  Comment: Min VC for management with RW, pt reports improved steadiness with RW    Stair Training deferred, pt unsafe/not appropriate to complete stairs at this time  # of Steps:   N/A  Level of Assist:  Not Tested  UE Support:  NA  Assistive Device:  N/A  Pattern:   N/A  Comments:     Therapeutic Exercise all completed bilaterally unless indicated  Ankle Pumps x 15 reps   Deferred further exercises as pt received phone call    Balance  Sitting:  Good ; Supervision  Comments: ~ 2 min at EOB    Standing: Fair ; CGA  Comments: Unilateral HHA required for static standing due to instability    Patient Education      Role of PT, POC, Discharge recommendations, safety awareness, transfer techniques, HEP and calling for assist with mobility. Positioning Needs       Pt reclined in chair, no alarm needed, positioned in proper neutral alignment and pressure relief provided.    Call light provided and all needs within reach    Activity Tolerance   Pt completed therapy session with SOB noted with ambulation, increased time with seated rest and PLB required to recover . SpO2: 90% on RA supine at rest   88% on RA sitting EOB, able to recover to 93% with PLB   86% on RA post-ambulation, ~ 1 min to recover to >90% with seated rest and PLB    Discussed O2 saturation with mobility with pt's RN    Other  N/A    Assessment :  Patient demonstrates improved tolerance for ambulation this date demonstrated by increased distance from initial evaluation. Pt requiring unilateral HHA for transfers with moderate pressure through therapist's hand for support. Improved stability and increased step length noted with use of RW. Recommending SNF upon discharge as patient functioning well below baseline, demonstrates good rehab potential and unable to return home due to limited or no family support, home environment not conducive to patient recovery and limited safety awareness. Goals (all goals ongoing unless otherwise indicated)  To be met in 3 visits:  1). Independent with LE Ex x 10 reps     To be met in 6 visits:  1). Supine to/from sit: Independent  2). Sit to/from stand: Modified Independent  3). Bed to chair: Modified Independent  4). Gait: Ambulate 150 ft.  with  Modified Independent and use of LRAD (least restrictive assistive device)  5). Tolerate B LE exercises 3 sets of 10-15 reps  6). Ascend/descend 2 steps with Modified Independent with use of hand rail bilateral and LRAD (least restrictive assistive device)    Plan   Continue with plan of care. Signature: Venancio Yeboah, PT, DPT #[id number]    If patient discharges from this facility prior to next visit, this note will serve as the Discharge Summary.

## 2022-02-28 LAB
ANION GAP SERPL CALCULATED.3IONS-SCNC: 8 MMOL/L (ref 3–16)
BUN BLDV-MCNC: 41 MG/DL (ref 7–20)
CALCIUM SERPL-MCNC: 10.2 MG/DL (ref 8.3–10.6)
CHLORIDE BLD-SCNC: 100 MMOL/L (ref 99–110)
CO2: 31 MMOL/L (ref 21–32)
CREAT SERPL-MCNC: 1.1 MG/DL (ref 0.6–1.2)
GFR AFRICAN AMERICAN: 57
GFR NON-AFRICAN AMERICAN: 47
GLUCOSE BLD-MCNC: 111 MG/DL (ref 70–99)
GLUCOSE BLD-MCNC: 128 MG/DL (ref 70–99)
GLUCOSE BLD-MCNC: 130 MG/DL (ref 70–99)
GLUCOSE BLD-MCNC: 144 MG/DL (ref 70–99)
GLUCOSE BLD-MCNC: 175 MG/DL (ref 70–99)
GLUCOSE BLD-MCNC: 188 MG/DL (ref 70–99)
MAGNESIUM: 2 MG/DL (ref 1.8–2.4)
PERFORMED ON: ABNORMAL
POTASSIUM SERPL-SCNC: 4.4 MMOL/L (ref 3.5–5.1)
SODIUM BLD-SCNC: 139 MMOL/L (ref 136–145)

## 2022-02-28 PROCEDURE — 6370000000 HC RX 637 (ALT 250 FOR IP): Performed by: INTERNAL MEDICINE

## 2022-02-28 PROCEDURE — 36415 COLL VENOUS BLD VENIPUNCTURE: CPT

## 2022-02-28 PROCEDURE — 94761 N-INVAS EAR/PLS OXIMETRY MLT: CPT

## 2022-02-28 PROCEDURE — 2060000000 HC ICU INTERMEDIATE R&B

## 2022-02-28 PROCEDURE — 6370000000 HC RX 637 (ALT 250 FOR IP)

## 2022-02-28 PROCEDURE — 97535 SELF CARE MNGMENT TRAINING: CPT

## 2022-02-28 PROCEDURE — 80048 BASIC METABOLIC PNL TOTAL CA: CPT

## 2022-02-28 PROCEDURE — 2580000003 HC RX 258

## 2022-02-28 PROCEDURE — 99233 SBSQ HOSP IP/OBS HIGH 50: CPT | Performed by: INTERNAL MEDICINE

## 2022-02-28 PROCEDURE — 99232 SBSQ HOSP IP/OBS MODERATE 35: CPT | Performed by: INTERNAL MEDICINE

## 2022-02-28 PROCEDURE — 2700000000 HC OXYGEN THERAPY PER DAY

## 2022-02-28 PROCEDURE — 97110 THERAPEUTIC EXERCISES: CPT

## 2022-02-28 PROCEDURE — 83735 ASSAY OF MAGNESIUM: CPT

## 2022-02-28 PROCEDURE — 97116 GAIT TRAINING THERAPY: CPT

## 2022-02-28 PROCEDURE — 6360000002 HC RX W HCPCS: Performed by: INTERNAL MEDICINE

## 2022-02-28 PROCEDURE — 2580000003 HC RX 258: Performed by: INTERNAL MEDICINE

## 2022-02-28 PROCEDURE — 97530 THERAPEUTIC ACTIVITIES: CPT

## 2022-02-28 RX ORDER — LOPERAMIDE HYDROCHLORIDE 2 MG/1
2 CAPSULE ORAL 4 TIMES DAILY PRN
Status: DISCONTINUED | OUTPATIENT
Start: 2022-02-28 | End: 2022-02-28

## 2022-02-28 RX ORDER — POLYETHYLENE GLYCOL 3350 17 G/17G
17 POWDER, FOR SOLUTION ORAL ONCE
Status: COMPLETED | OUTPATIENT
Start: 2022-02-28 | End: 2022-02-28

## 2022-02-28 RX ADMIN — POLYETHYLENE GLYCOL 3350 17 G: 17 POWDER, FOR SOLUTION ORAL at 08:56

## 2022-02-28 RX ADMIN — SACUBITRIL AND VALSARTAN 1 TABLET: 24; 26 TABLET, FILM COATED ORAL at 21:01

## 2022-02-28 RX ADMIN — SACUBITRIL AND VALSARTAN 1 TABLET: 24; 26 TABLET, FILM COATED ORAL at 12:26

## 2022-02-28 RX ADMIN — DOCUSATE SODIUM 100 MG: 100 CAPSULE ORAL at 08:56

## 2022-02-28 RX ADMIN — SPIRONOLACTONE 25 MG: 25 TABLET ORAL at 12:21

## 2022-02-28 RX ADMIN — ASPIRIN 81 MG: 81 TABLET, COATED ORAL at 08:56

## 2022-02-28 RX ADMIN — Medication 2000 UNITS: at 08:56

## 2022-02-28 RX ADMIN — SODIUM CHLORIDE, PRESERVATIVE FREE 10 ML: 5 INJECTION INTRAVENOUS at 08:57

## 2022-02-28 RX ADMIN — ENOXAPARIN SODIUM 40 MG: 100 INJECTION SUBCUTANEOUS at 08:56

## 2022-02-28 RX ADMIN — CARVEDILOL 6.25 MG: 6.25 TABLET, FILM COATED ORAL at 16:57

## 2022-02-28 RX ADMIN — INSULIN LISPRO 2 UNITS: 100 INJECTION, SOLUTION INTRAVENOUS; SUBCUTANEOUS at 20:00

## 2022-02-28 RX ADMIN — SODIUM CHLORIDE, PRESERVATIVE FREE 10 ML: 5 INJECTION INTRAVENOUS at 21:01

## 2022-02-28 RX ADMIN — TORSEMIDE 20 MG: 20 TABLET ORAL at 08:56

## 2022-02-28 RX ADMIN — OXYCODONE HYDROCHLORIDE AND ACETAMINOPHEN 500 MG: 500 TABLET ORAL at 21:01

## 2022-02-28 RX ADMIN — MUPIROCIN: 20 OINTMENT TOPICAL at 21:01

## 2022-02-28 RX ADMIN — INSULIN LISPRO 2 UNITS: 100 INJECTION, SOLUTION INTRAVENOUS; SUBCUTANEOUS at 12:27

## 2022-02-28 RX ADMIN — CARVEDILOL 6.25 MG: 6.25 TABLET, FILM COATED ORAL at 08:56

## 2022-02-28 RX ADMIN — POLYETHYLENE GLYCOL 3350 17 G: 17 POWDER, FOR SOLUTION ORAL at 15:04

## 2022-02-28 RX ADMIN — OXYCODONE HYDROCHLORIDE AND ACETAMINOPHEN 500 MG: 500 TABLET ORAL at 08:56

## 2022-02-28 ASSESSMENT — PAIN SCALES - WONG BAKER

## 2022-02-28 ASSESSMENT — PAIN SCALES - GENERAL: PAINLEVEL_OUTOF10: 0

## 2022-02-28 NOTE — CARE COORDINATION
INTERDISCIPLINARY PLAN OF CARE CONFERENCE    Date/Time: 2/28/2022 9:38 AM  Completed by: POLA Valdez   Case Management    Patient Name:  Ilana Redman  YOB: 1935  Admitting Diagnosis: Acute respiratory failure with hypoxia ([facility] [J96.01]  Acute respiratory failure with hypoxia and hypercapnia ([facility] [address] [J96.01, J96.02]     Admit Date/Time:  2/23/2022  1:09 PM    Chart reviewed. Interdisciplinary team contacted or reviewed plan related to patient progress and discharge plans. Disciplines included Case Management, Nursing, and Dietitian. Current Status: Ongoing. PT/OT recommendation for discharge plan of care: SNF    Expected D/C Disposition:  Skilled nursing facility  Confirmed plan with patient and/or family Yes confirmed with: (name) pt and pt's daughter Ngozi Yanez via phone call. Discharge Plan Comments: Chart review completed. Pt remains in the ICU. Completed Interdisciplinary rounds with ICU staff; staff aware pt will require precert for SNF; provided RN a SNF and covid SNF list to provide to pt to review. Spoke with pt via phone call due to covid isolation. She inquired about the [facility]. Explained they may not be accepting Covid + pt's but writer would check. She also stated [facility],  [address] ([address]) and [facility]. Pt agreeable to writer calling her daughter Ngozi Yanez back. Called and spoke with pt's daughter Ngozi Yanez. Jeanette inquired why pt can't go to another SNF. Explained since pt is covid +, most SNF's won't accept covid + pt's or must be 10-20 days out depending the SNF. She inquired about the [facility] and [facility]. Jeanette inquired how to get the covid + test away as pt also tested negative for covid. Jeanette aware most people test + for up to 90 days. Discussed skilled [facility] if they don't want pt to go to a SNF on the covid list but she would get more therapy at a SNF. Message left for Louis at the [facility] requesting a call back.  Message left for Ilaina at [facility]. Pt requires a precert for SNF. CM will continue to follow and assist. Please notify CM if needs or concerns arise. Home O2 in place on admit: No    Addendum at 2:32pm: Received voicemail from [address] at the [facility] stating they don't accept covid + pt's until 14 days from diagnosis. Iliana at [facility] stated they don't accept covid pt's until they are 10 days post + and 14-20 if still showing symptoms. Updated pt's daughter Renville Aas. She again inquired how to get the covid test out of the chart as she had a positive and a negative. Encouraged her to speak with RN and MD. She requested writer inquire about [facility]. She is aware pt currently has no discharge order and will need insurance authorization for SNF. She states she will call her siblings and discuss. She is aware it's pt choice. Message left for OVM    Addendum at 3:43pm: Fadi Burns at [address] called stating they don't accept pt's until 10 days past covid +. Updated pt via phone call on the above and she requested to not back referrals to [facility] or [facility] until she speaks with her daughters.  Cm will follow up tomorrow on choice

## 2022-02-28 NOTE — FLOWSHEET NOTE
Pt bed quit working and then was unplugged and re plugged, unsure of validity of am weight. Pt purewick leaked bed pad and diaper changed and jose care complete. Purewick replaced. Pt denies further needs at this time.

## 2022-02-28 NOTE — PROGRESS NOTES
IM Progress Note    Admit Date:  2/23/2022    Patient admitted with acute hypoxic respiratory failure, COVID-19 pneumonia. vaccinated patient. Initially admitted to ICU, required BiPAP. O2 sats have improved now. patient can be transferred to PCU. Subjective:  Ms. Toi Osborne is stable today seen sitting up in chair  Needing 2 L O2 today   Back on demadex now,     She has new cardiomyopathy and IV Lasix given for possible CHF on admission. Currently she is appears compensated, Lasix has been held due to elevated creatinine. Objective:   /79   Pulse 77   Temp 98.3 °F (36.8 °C) (Oral)   Resp 19   Wt 185 lb 12.8 oz (84.3 kg)   SpO2 93%   Breastfeeding No   BMI 32.91 kg/m²       Intake/Output Summary (Last 24 hours) at 2/28/2022 0755  Last data filed at 2/28/2022 0453  Gross per 24 hour   Intake 840 ml   Output 1200 ml   Net -360 ml         Physical Exam:   Patient seen in droplet plus precautions  General:  Obese elderly female up in bed  Awake, alert, NAD  Skin:  Warm and dry  Neck:  JVD absent. Neck supple  Chest:  Clear to auscultation, respiration easy. No wheezes, rales or rhonchi. Cardiovascular:  RRR ,S1S2 normal  Abdomen:  Soft, non tender, non distended, BS +  Extremities:  Trace benja LE edema resolved  Intact peripheral pulses.  Brisk cap refill, < 2 secs  Neuro: non focal      Medications:   Scheduled Meds:   carvedilol  6.25 mg Oral BID WC    torsemide  20 mg Oral Daily    spironolactone  25 mg Oral Lunch    docusate sodium  100 mg Oral Daily    polyethylene glycol  17 g Oral Daily    mupirocin   Nasal BID    enoxaparin  40 mg SubCUTAneous Daily    aspirin  81 mg Oral Daily    sodium chloride flush  5-40 mL IntraVENous 2 times per day    insulin lispro  0-12 Units SubCUTAneous Q4H    Vitamin D  2,000 Units Oral Daily    ascorbic acid  500 mg Oral BID       Continuous Infusions:   dextrose      sodium chloride         Data:  CBC:   Recent Labs     02/26/22  0090 02/27/22  0433   WBC 9.1 8.4   RBC 4.78 4.86   HGB 13.0 13.1   HCT 40.0 40.4   MCV 83.6 83.1   RDW 14.8 15.2    261     BMP:   Recent Labs     02/26/22  0330 02/27/22  0433 02/28/22  0419    137 139   K 4.3 4.4 4.4    100 100   CO2 30 30 31   BUN 34* 34* 41*   CREATININE 1.2 1.1 1.1       Cultures  COVID PCR done twice. One is positive and one is negative. Influenza A and B not detected      Radiology  CT CHEST PULMONARY EMBOLISM W CONTRAST   Final Result   No evidence of pulmonary embolism. COVID-19 pneumonia as above. XR CHEST PORTABLE   Final Result   Findings most consistent with CHF with vascular congestion and interstitial   edema. ECHO      Summary   LV systolic function is reduced with ejection fraction estimated at 35%. There is mild global hypokinesis. There is mild concentric left ventricular hypertrophy. Elevated left ventricular diastolic filling pressure. The right atrium is mildly dilated. Mild posterior mitral annular calcification is present. Aortic valve appears sclerotic but opens adequately. Moderate mitral regurgitation. Mild aortic, tricuspid, and pulmonic regurgitation is present. Systolic pulmonary artery pressure (SPAP) estimated at 76 mmHg (RA pressure   8 mmHg), consistent with severe pulmonary hypertension. There is a trivial circumferential pericardial effusion noted. 11/21/2016 EF greater than 70%Dynamic outflow tract obstruction, LVH, DD1,   posterior MAC       Assessment:  Principal Problem:    Acute respiratory failure with hypoxia (HCC)  Active Problems:    Cardiomyopathy (HCC)    SOB (shortness of breath)    COVID-19    Acute respiratory failure with hypoxia and hypercapnia (HCC)    Acute systolic CHF (congestive heart failure) (HCC)  Resolved Problems:    * No resolved hospital problems.  *      Plan:    # Acute respiratory failure with hypoxia and hypercapnia  -Secondary to COVID-19 pneumonia, CHF  -Admitted to ICU, s/p BiPAP. She has improved now. Oxygen saturation stable on 2L  Can transfer  to PCU      #COVID-19 pneumonia  -Vaccinated patient  -Covid testing done twice. One was positive and one was negative. Both were PCR studies   - CT chest did show changes consistent with Covid pneumonia, no PE.  -Oxygen saturation stable keep on droplet plus precautions for now  -Continue Decadron, day #5  -Inhaled bronchodilators    #Acute systolic CHF  # cardiomyopathy- likely ischemic  #Severe pulmonary hypertension  -Patient with shortness of breath for several months, she had outpatient echocardiogram done -> sent to the ER from echo department for increased shortness of breath.  -Echo shows decreased ejection fraction 35% with pulmonary hypertension.  -Cardiology consulted- for ischemic eval  -IV lasix changed to demadex 20 mg daily    Coreg added. Adding low dose Entresto 24/26 mg bid today     Monitor renal function. # DM-2  -On sliding scale insulin  - repeat hemoglobin A1c 6.4%    # Hypercalcemia  - monitor with diuresis  Calcium levels have improved now.  11.1-> 10.6     #Obesity     DVT Prophylaxis: Lovenox  ADULT DIET;  Regular; Low Fat/Low Chol/High Fiber/2 gm Na  Code Status: Full Code    Transfer to PCU      Catrachita Suarez MD, MD 2/28/2022 7:55 AM

## 2022-02-28 NOTE — PROGRESS NOTES
Rounds completed with Dr. Sari Rey at this time. New order to give additional dose of Miralax at 14:00. Per MD, pt should be the first to move out of ICU.

## 2022-02-28 NOTE — PROGRESS NOTES
[facility]   Progress Note  Cardiology      HPI: Ms. Kai Tim is being seen today for f/u undefined cardiomyopathy. She c/o constipation today but denies any CP or SOB. Tele reviewed showing NSR 83bpm; PVC      Physical Examination:    Vitals:    02/28/22 0143   BP: 134/79   Pulse: 77   Resp: 19   Temp: 98.3 °F (36.8 °C)   SpO2: 93%          Constitutional and General Appearance: NAD   Respiratory:  · Normal excursion and expansion without use of accessory muscles  · Resp Auscultation: Normal breath sounds without dullness  Cardiovascular:  · The apical impulses not displaced  · Heart tones are crisp and normal  · Cervical veins are not engorged  · The carotid upstroke is normal in amplitude and contour without delay or bruit  · Normal S1S2, No S3, No Murmur  · Peripheral pulses are symmetrical and full  · There is no clubbing, cyanosis of the extremities.   · No edema  · Pedal Pulses: 2+ and equal   Abdomen:  · No masses or tenderness  · Liver/Spleen: No Abnormalities Noted  Neurological/Psychiatric:  · Alert and oriented in all spheres  · Moves all extremities well  · No abnormalities of mood, affect, memory, mentation, or behavior are noted  Skin: warm and dry      Lab Results   Component Value Date    WBC 8.4 02/27/2022    HGB 13.1 02/27/2022    HCT 40.4 02/27/2022    MCV 83.1 02/27/2022     02/27/2022     Lab Results   Component Value Date    CREATININE 1.1 02/28/2022    BUN 41 (H) 02/28/2022     02/28/2022    K 4.4 02/28/2022     02/28/2022    CO2 31 02/28/2022     No results found for: INR, PROTIME       EKG 2/23/2022  Sinus tachycardiaBaseline artifactNonspecific ST and T wave abnormality 140 bpm     Echo 2/23/2022  Summary   LV systolic function is reduced with ejection fraction estimated at 35%.   There is mild global hypokinesis.   There is mild concentric left ventricular hypertrophy.   Elevated left ventricular diastolic filling pressure.   The right atrium is mildly dilated.   Mild posterior mitral annular calcification is present.   Aortic valve appears sclerotic but opens adequately.   Moderate mitral regurgitation.   Mild aortic, tricuspid, and pulmonic regurgitation is present.   Systolic pulmonary artery pressure (SPAP) estimated at 76 mmHg (RA pressure   8 mmHg), consistent with severe pulmonary hypertension.  Caroline Leake is a trivial circumferential pericardial effusion noted.   11/21/2016 EF greater than 70%Dynamic outflow tract obstruction, LVH, DD1,   posterior MAC     CXR 2/23/2022  Findings most consistent with CHF with vascular congestion and interstitial   edema.      CT Chest 2/23/2022  No evidence of pulmonary embolism.       COVID-19 pneumonia as above.         Assessment:  Sasha Cueva is a 81 y.o. patient who presented to [facility] 2/23/2022 with complaints of SOB. She was in outpatient getting an ECHO for progressive SOB. Rapid response was called. She has PMH significant for HTN, HLD, and DM. No known cardiac history. Note Echo 11/21/2016 EF=>70%; mild cLVH; grade I DD. Presented to ED in acute respiratory failure. Reports 6 months of SOB mainly with exertion and recently c/o SOB with laying flat and waking up from sleep SOB. Symptoms improved with BiPap, steroids, albuterol, Lasix 40mg IV x 1, and Nitro-Bid ointment. She did test positive for COVID-19. Note EKG ST 140bpm; baseline artifact; nonspecific ST and T wave abnormality. Most recent Echo 2/23/2022 EF=35%; mild global HK; mild cLVH; RA mildly dilated; AV sclerosis;; mild posterior MAC; mod MR; mild AR, TR and OH; severe pulmonary HTN (SPAP) estimated at 76 mmHg; BUN 21.  Cr 1.3. Calcium 11.1. BNP 13,005. Troponin <0.01. Note CXR consistent with CHF. Note Chest CT highly suggestive of COVID-19 pneumonia. Admitted to ICU on BiPAP and now on NC 3L. 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 Recs:  1. Continue demadex 20mg po daily. Total net negative 2.2L  2. Continue coreg 6.25mg BID and aldactone 25mg daily for LV dysfunction. 3. Add Entresto  24-26mg po BID today for LV dysfunction. 4. I will order a lexiscan nuclear stress test to assess myocardial perfusion and LV function for Tuesday. 5.. Goal will be to continue [address] for cardiomyopathy and will have OV and repeat ECHO in couple of months to reassess LVEF. 6. Likely to SNF rehab next couple of days. I discussed case with daughter on phone and updated her and answered questions.      Patient Active Problem List   Diagnosis    Osteoarthritis    Mixed hyperlipidemia    Essential hypertension    Diabetes mellitus ([address]    Vitamin D deficiency    Stress incontinence    Hypercalcemia    Shoulder impingement    Bilateral adhesive capsulitis of shoulders    Ruptured Bakers cyst    Tear of medial meniscus of right knee, current    Hyperuricemia    Acute respiratory failure with hypoxia ([address]    Cardiomyopathy ([address]    SOB (shortness of breath)    COVID-19    Acute respiratory failure with hypoxia and hypercapnia (HCC)    Acute systolic CHF (congestive heart failure) ([address]

## 2022-02-28 NOTE — PLAN OF CARE
Problem: Skin Integrity:  Goal: Absence of new skin breakdown  Description: Absence of new skin breakdown  Outcome: Ongoing     Problem: DAILY CARE  Goal: Daily care needs are met  Outcome: Ongoing     Problem: PAIN  Goal: Patient's pain/discomfort is manageable  Outcome: Ongoing

## 2022-02-28 NOTE — PROGRESS NOTES
Pulmonary & Critical Care Medicine ICU Progress Note    CC: Orthopnea, hypoxemia    Events of Last 24 hours:   Again with mild desaturations with sleep  Torsemide x1 by cardiology    Vascular lines: IV: Peripheral    MV: None     / / /FiO2 : 50 %  No results for input(s): PHART, KRT8QZP, PO2ART in the last 72 hours. IV:   dextrose      sodium chloride         Vitals:  Blood pressure 134/79, pulse 77, temperature 98.3 °F (36.8 °C), temperature source Oral, resp. rate 19, weight 185 lb 12.8 oz (84.3 kg), SpO2 93 %, not currently breastfeeding. on 1 L with sleep   Temp  Av.5 °F (36.9 °C)  Min: 98.2 °F (36.8 °C)  Max: 99 °F (37.2 °C)    Intake/Output Summary (Last 24 hours) at 2022 0741  Last data filed at 2022 0453  Gross per 24 hour   Intake 840 ml   Output 1200 ml   Net -360 ml     PE:  Constitutional:  No acute distress. HENT:  Oropharynx is clear and moist.   Neck: No tracheal deviation present. Cardiovascular: Normal heart sounds. No lower extremity edema. Pulmonary/Chest: No wheezes. No rhonchi. No rales. No decreased breath sounds. No accessory muscle usage or stridor. Musculoskeletal: No cyanosis. No clubbing. Skin: Skin is warm and dry. Psychiatric: Normal mood and affect.   Neurologic: speech fluent, alert and oriented, strength symmetric        Scheduled Meds:   carvedilol  6.25 mg Oral BID WC    torsemide  20 mg Oral Daily    spironolactone  25 mg Oral Lunch    docusate sodium  100 mg Oral Daily    polyethylene glycol  17 g Oral Daily    mupirocin   Nasal BID    enoxaparin  40 mg SubCUTAneous Daily    aspirin  81 mg Oral Daily    sodium chloride flush  5-40 mL IntraVENous 2 times per day    insulin lispro  0-12 Units SubCUTAneous Q4H    Vitamin D  2,000 Units Oral Daily    ascorbic acid  500 mg Oral BID       Data:  CBC:   Recent Labs     22  0330 22  0433   WBC 9.1 8.4   HGB 13.0 13.1   HCT 40.0 40.4   MCV 83.6 83.1    261     BMP:   Recent Labs     02/26/22  0330 02/27/22  0433 02/28/22  0419    137 139   K 4.3 4.4 4.4    100 100   CO2 30 30 31   BUN 34* 34* 41*   CREATININE 1.2 1.1 1.1     LIVER PROFILE:   No results for input(s): AST, ALT, LIPASE, BILIDIR, BILITOT, ALKPHOS in the last 72 hours. Invalid input(s): AMYLASE,  ALB    Micro:  2/23/2022 SARS-CoV-2 positive, influenza negative  2/23/2022 SARS-CoV-2 negative, influenza negative    Imaging:  CTPA 2/23/2022 personally reviewed  Pulmonary Arteries: Pulmonary arteries are adequately opacified for   evaluation.  No evidence of intraluminal filling defect to suggest pulmonary   embolism.  Main pulmonary artery is normal in caliber. Mediastinum: No evidence of mediastinal lymphadenopathy.  The heart and   pericardium demonstrate no acute abnormality.  There is no acute abnormality   of the thoracic aorta. Lungs/pleura: Bilateral ground-glass opacities, right greater than left, with   bilateral lower lobe nodular consolidations, highly suggestive of viral   pneumonia as seen with COVID-19.  Trace bilateral pleural effusions. Upper Abdomen: Limited images of the upper abdomen are unremarkable. Soft Tissues/Bones:  Age related degenerative changes of the visualized   osseous structures without focal destructive lesion.  Visualized soft tissues   are unremarkable.       Impression:       No evidence of pulmonary embolism. COVID-19 pneumonia as above.     -- ground glass is mild, UL predominant with RLL focal consolidation/atelectasis    ECHO 2/23/22   EF 35% (prior ECHO in 2016 with EF 70%)  Global hypokinesis  Elevated LVEDP  SPAP 76    ASSESSMENT:  · COVID-19 infection in a fully vaccinated patient  · Acute hypoxemic and hypercapnic respiratory failure 2/2 CHF  · Acute systolic heart failure  · Probable severe pulmonary hypertension (by Echo)  · Morbid obesity  · Mild JANAE  · DM    PLAN:   COVID-19 isolation, droplet plus   Heart failure drugs per cardiology   Bowel regimen    Lovenox prophylaxis    Transferred to PCU care yesterday.   I will not follow once she physically moves out of the ICU    D/W Dr. Ryan Baca

## 2022-02-28 NOTE — PROGRESS NOTES
Inpatient Occupational Therapy Treatment    Unit: ICU  Date:  2/28/2022  Patient Name:    Jason Mims  Admitting diagnosis:  Acute respiratory failure with hypoxia [facility]) [J96.01]  Acute respiratory failure with hypoxia and hypercapnia ([facility] [J96.01, J96.02]  Admit Date:  2/23/2022  Precautions/Restrictions/WB Status/ Lines/ Wounds/ Oxygen: fall risk, IV, bed/chair alarm, supplemental O2 (2L), telemetry, ICU monitoring, continuous pulse ox and Droplet Plus precautions (+ COVID 19), natachack    Treatment Time:  5838-8580  Treatment Number:  2    Billable Treatment Time:  49 minutes   Total Treatment Time:   49  minutes    Patient Goals for Therapy:  \" get my bowels moving \"      Discharge Recommendations: SNF  DME needs for discharge: defer to facility       Therapy recommendations for staff:   Assist of 1 with use of rolling walker (RW) for all transfers to/from BSC/chair    History of Present Illness: 81 y.o. female who presented to the hospital with a chief complaint of shortness of breath. Very pleasant 50-year-old female who was get an outpatient echocardiogram obtained for worsening dyspnea on exertion and at rest.  During her procedure she developed shortness of breath and difficulty breathing. A rapid response was called and she was taken to the emergency department for evaluation. In the emergency department she was tachypneic, hypoxic and acidotic. She was placed on BiPAP and was admitted to the ICU for further care. An echocardiogram obtained revealed new onset systolic dysfunction which was not there in prior echocardiograms with severe pulmonary hypertension. She was also positive for COVID-19. Of note she has been fully vaccinated with a booster. When I saw the patient she was awake, alert and not on BiPAP therapy. She will be observed in the ICU overnight.     Home Health S4 Level Recommendation:  Level 1 Standard  AM-PAC Score: AM-PAC Inpatient Daily Activity Raw Score: 16    Pain Yes  Rating:mild  Location:abdomen-attributes to constipation  Pain Medicine Status: No request made      Cognition    A&O x4   Able to follow 2 step commands    Subjective  Patient lying supine in bed with no family present - on bedpan, trying to have BM without success  Pt agreeable to this OT tx. Balance  Functional Sitting Balance:  WFL  Functional Standing Balance:Diminished  CGA to Min A, HHA    Bed mobility:    Supine to sit:   SBA  Sit to supine:   Not Tested  Rolling:    Not Tested  Scooting in sitting:  SBA  Scooting to head of bed:   Not Tested    Bridging:   Not Tested    Transfers:    Sit to stand:  CGA with cues for hand placement   Stand to sit:  CGA with cues for hand placement  Bed to chair:   Min A with RW, gait belt  Standard toilet: Not Tested  Bed to [facility]:  Not Tested    Dressing:      UE:   Not Tested  LE:    Min A    Grooming:    Combing hair:  Independent    Eating:   Not Tested    Toileting: Max A cleanse periarea and change Depends after small BM on bedpan prior to OT arrival.  Max A change soiled purewick. Patient reports she was unaware that she had moved bowels. Encouraged toileting on [facility] next time, RN notified and BSC provided. Grooming: Not Tested    Activity Tolerance   Pt completed therapy session with SOB noted with activity  Supine:  SpO2 96% on 2L  HR 86  /73    Up in chair:  SpO2 96% on 2L  HR 90  /81       Positioning Needs:   Up in chair, call light and needs in reach. Alarm Set   Provided pressure relieving cushion as patient complains chair is uncomfortable which limits time OOB. Exercise / Activities Initiated:   Shoulder flex/ext:  x10  Arm circles at 90 degrees CW/CCW:  x20    Patient/Family Education:   Role of OT  Recommendations for DC  Energy conservation techniques    Assessment:     Goal(s) : To be met in 3 Visits:  1). Bed to toilet/BSC: SBA    To be met in 5 Visits:  1). Supine to/from Sit:  SBA  2). Upper Body Bathing:   SBA  3).

## 2022-02-28 NOTE — FLOWSHEET NOTE
Pt repositioned in bed, brief checked and dry purewick in place. VSS. Pt denies pain or needs at this time.

## 2022-02-28 NOTE — PROGRESS NOTES
Inpatient Physical Therapy Daily Treatment Note    Unit: ICU  Date:  2/28/2022  Patient Name:    Vika Pathak  Admitting diagnosis:  Acute respiratory failure with hypoxia [facility]) [J96.01]  Acute respiratory failure with hypoxia and hypercapnia ([facility] [address] [J96.01, J96.02]  Admit Date:  2/23/2022  Precautions/Restrictions:  Fall risk, Bed/chair alarm, Lines -IV, Supplemental O2 (2L/min) and Purewick catheter, [place] (hard of hearing), Telemetry, Continuous pulse oximetry, Isolation Precautions: Droplet Plus - COVID and ICU monitoring      Discharge Recommendations: SNF  DME needs for discharge: defer to facility       Therapy recommendation for EMS Transport: can transport by wheelchair    Therapy recommendations for staff:   Assist of 1 with use of rolling walker (RW) for all transfers and ambulation to/from [facility]  to/from chair    History of Present Illness: Per Dr. Lizzette Eaton 2/23: \"11 y. o. female who presented to the hospital with a chief complaint of shortness of breath.  Very pleasant 61-year-old female who was get an outpatient echocardiogram obtained for worsening dyspnea on exertion and at rest.  During her procedure she developed shortness of breath and difficulty breathing.  A rapid response was called and she was taken to the emergency department for evaluation.  In the emergency department she was tachypneic, hypoxic and acidotic.  She was placed on BiPAP and was admitted to the ICU for further care.  An echocardiogram obtained revealed new onset systolic dysfunction which was not there in prior echocardiograms with severe pulmonary hypertension.  She was also positive for COVID-19.  Of note she has been fully vaccinated with a booster.  When I saw the patient she was awake, alert and not on BiPAP therapy.  She will be observed in the ICU overnight. \"   PMHx including not limited to:  HTN, HLD, and DM  Echo 2/23: EF=35%, pulm hypertension.      Home Health S4 Level Recommendation: NA  AM-PAC Mobility Score   AM-PAC Inpatient Mobility Raw Score : 17    Treatment Time: 6059 - 1966  Treatment number: 3  Timed Code Treatment Minutes: 28 minutes  Total Treatment Minutes:  28 minutes    Cognition    A&O x4   Able to follow 2 step commands    Subjective  Patient lying supine in bed with no family present   Pt agreeable to this PT tx. Pain   Yes  Location:R hip (intermittently)  Rating:    mild  Pain Medicine Status: No request made     Bed Mobility   Supine to Sit:    CGA  Sit to Supine:   CGA  Rolling:   Modified Independent with use of bed rail  Scooting:   SBA in sitting and supine    Transfer Training     Sit to stand:   CGA  Stand to sit:   CGA  Bed to Chair:   CGA with use of gait belt and rolling walker (RW)    Gait Training gait completed as indicated below  Distance:      25 ft  Deviations (firm surface/linoleum):  decreased kelvin, forward flexed posture, decreased step length on right and decreased stance time on right  Assistive Device Used:    rolling walker (RW)  Level of Assist:    CGA  Comment: Patient showed decreased walking speed and verbal cueing for RW negotiation during ambulation. Stair Training deferred, pt unsafe/not appropriate to complete stairs at this time  # of Steps:   N/A  Level of Assist:  Not Tested  UE Support:  NA  Assistive Device:  N/A  Pattern:   N/A  Comments:     Therapeutic Exercise all completed bilaterally unless indicated  Ankle Pumps x 20 reps  Heel slides x 10 reps  LAQ x 10 reps  SLR x 10 reps  marching x 10 reps  Hip abduction: x 10 reps  bridging in hook lying position x 10 reps       Balance  Sitting:  Good - ; SBA  Comments: ~ 8 min at EOB    Standing: Fair -; CGA  Comments: ~5 min     Patient Education      Role of PT, POC, Discharge recommendations, safety awareness, transfer techniques, HEP and calling for assist with mobility. Positioning Needs       Pt in bed, no alarm needed, positioned in proper neutral alignment and pressure relief provided.    Call light provided and all needs within reach    Activity Tolerance   Pt completed therapy session with No adverse symptoms noted w/activity. SpO2: Patient maintained SpO2 between 93-99% while on RA during functional mobility in the room. RN notified about the treatment response. Other  N/A    Assessment :  Patient showed improved functional mobility today with decreased level of assistance for bed mobility and ambulation while on RA. Recommending SNF upon discharge as patient functioning well below baseline, demonstrates good rehab potential and unable to return home due to limited or no family support, home environment not conducive to patient recovery and limited safety awareness. Goals (all goals ongoing unless otherwise indicated)  To be met in 3 visits:  1). Independent with LE Ex x 10 reps     To be met in 6 visits:  1). Supine to/from sit: Independent  2). Sit to/from stand: Modified Independent  3). Bed to chair: Modified Independent  4). Gait: Ambulate 150 ft.  with  Modified Independent and use of LRAD (least restrictive assistive device)  5). Tolerate B LE exercises 3 sets of 10-15 reps  6). Ascend/descend 2 steps with Modified Independent with use of hand rail bilateral and LRAD (least restrictive assistive device)    Plan   Continue with plan of care. Signature: Kathrin Tenorio, MS PT, # [id number]    If patient discharges from this facility prior to next visit, this note will serve as the Discharge Summary.

## 2022-03-01 ENCOUNTER — APPOINTMENT (OUTPATIENT)
Dept: NUCLEAR MEDICINE | Age: 87
DRG: 177 | End: 2022-03-01
Payer: MEDICARE

## 2022-03-01 LAB
ANION GAP SERPL CALCULATED.3IONS-SCNC: 17 MMOL/L (ref 3–16)
BUN BLDV-MCNC: 43 MG/DL (ref 7–20)
CALCIUM SERPL-MCNC: 10.3 MG/DL (ref 8.3–10.6)
CHLORIDE BLD-SCNC: 98 MMOL/L (ref 99–110)
CO2: 24 MMOL/L (ref 21–32)
CREAT SERPL-MCNC: 1.2 MG/DL (ref 0.6–1.2)
GFR AFRICAN AMERICAN: 52
GFR NON-AFRICAN AMERICAN: 43
GLUCOSE BLD-MCNC: 135 MG/DL (ref 70–99)
GLUCOSE BLD-MCNC: 139 MG/DL (ref 70–99)
GLUCOSE BLD-MCNC: 139 MG/DL (ref 70–99)
GLUCOSE BLD-MCNC: 159 MG/DL (ref 70–99)
GLUCOSE BLD-MCNC: 163 MG/DL (ref 70–99)
GLUCOSE BLD-MCNC: 172 MG/DL (ref 70–99)
GLUCOSE BLD-MCNC: 191 MG/DL (ref 70–99)
LV EF: 32 %
LVEF MODALITY: NORMAL
PERFORMED ON: ABNORMAL
POTASSIUM SERPL-SCNC: 4.3 MMOL/L (ref 3.5–5.1)
PRO-BNP: 3329 PG/ML (ref 0–449)
SODIUM BLD-SCNC: 139 MMOL/L (ref 136–145)

## 2022-03-01 PROCEDURE — 6370000000 HC RX 637 (ALT 250 FOR IP): Performed by: INTERNAL MEDICINE

## 2022-03-01 PROCEDURE — 93017 CV STRESS TEST TRACING ONLY: CPT

## 2022-03-01 PROCEDURE — 99233 SBSQ HOSP IP/OBS HIGH 50: CPT | Performed by: INTERNAL MEDICINE

## 2022-03-01 PROCEDURE — 36415 COLL VENOUS BLD VENIPUNCTURE: CPT

## 2022-03-01 PROCEDURE — 99232 SBSQ HOSP IP/OBS MODERATE 35: CPT | Performed by: INTERNAL MEDICINE

## 2022-03-01 PROCEDURE — 2580000003 HC RX 258: Performed by: INTERNAL MEDICINE

## 2022-03-01 PROCEDURE — 78452 HT MUSCLE IMAGE SPECT MULT: CPT

## 2022-03-01 PROCEDURE — 3430000000 HC RX DIAGNOSTIC RADIOPHARMACEUTICAL: Performed by: INTERNAL MEDICINE

## 2022-03-01 PROCEDURE — 83880 ASSAY OF NATRIURETIC PEPTIDE: CPT

## 2022-03-01 PROCEDURE — 2060000000 HC ICU INTERMEDIATE R&B

## 2022-03-01 PROCEDURE — A9502 TC99M TETROFOSMIN: HCPCS | Performed by: INTERNAL MEDICINE

## 2022-03-01 PROCEDURE — 6360000002 HC RX W HCPCS: Performed by: INTERNAL MEDICINE

## 2022-03-01 PROCEDURE — 80048 BASIC METABOLIC PNL TOTAL CA: CPT

## 2022-03-01 RX ORDER — SODIUM CHLORIDE 0.9 % (FLUSH) 0.9 %
5-40 SYRINGE (ML) INJECTION EVERY 12 HOURS SCHEDULED
Status: CANCELLED | OUTPATIENT
Start: 2022-03-01

## 2022-03-01 RX ORDER — SODIUM CHLORIDE 9 MG/ML
INJECTION, SOLUTION INTRAVENOUS CONTINUOUS
Status: DISCONTINUED | OUTPATIENT
Start: 2022-03-01 | End: 2022-03-02 | Stop reason: HOSPADM

## 2022-03-01 RX ORDER — SODIUM CHLORIDE 9 MG/ML
INJECTION, SOLUTION INTRAVENOUS CONTINUOUS
Status: CANCELLED | OUTPATIENT
Start: 2022-03-01

## 2022-03-01 RX ORDER — SODIUM CHLORIDE 0.9 % (FLUSH) 0.9 %
5-40 SYRINGE (ML) INJECTION PRN
Status: CANCELLED | OUTPATIENT
Start: 2022-03-01

## 2022-03-01 RX ORDER — SODIUM CHLORIDE 9 MG/ML
25 INJECTION, SOLUTION INTRAVENOUS PRN
Status: CANCELLED | OUTPATIENT
Start: 2022-03-01

## 2022-03-01 RX ADMIN — TORSEMIDE 20 MG: 20 TABLET ORAL at 14:49

## 2022-03-01 RX ADMIN — INSULIN LISPRO 2 UNITS: 100 INJECTION, SOLUTION INTRAVENOUS; SUBCUTANEOUS at 20:13

## 2022-03-01 RX ADMIN — SACUBITRIL AND VALSARTAN 1 TABLET: 24; 26 TABLET, FILM COATED ORAL at 20:13

## 2022-03-01 RX ADMIN — Medication 2000 UNITS: at 14:49

## 2022-03-01 RX ADMIN — SODIUM CHLORIDE, PRESERVATIVE FREE 10 ML: 5 INJECTION INTRAVENOUS at 14:50

## 2022-03-01 RX ADMIN — SODIUM CHLORIDE: 9 INJECTION, SOLUTION INTRAVENOUS at 18:30

## 2022-03-01 RX ADMIN — REGADENOSON 0.4 MG: 0.08 INJECTION, SOLUTION INTRAVENOUS at 13:10

## 2022-03-01 RX ADMIN — ENOXAPARIN SODIUM 40 MG: 100 INJECTION SUBCUTANEOUS at 14:53

## 2022-03-01 RX ADMIN — TETROFOSMIN 33.7 MILLICURIE: 1.38 INJECTION, POWDER, LYOPHILIZED, FOR SOLUTION INTRAVENOUS at 13:10

## 2022-03-01 RX ADMIN — OXYCODONE HYDROCHLORIDE AND ACETAMINOPHEN 500 MG: 500 TABLET ORAL at 20:13

## 2022-03-01 RX ADMIN — SPIRONOLACTONE 25 MG: 25 TABLET ORAL at 14:49

## 2022-03-01 RX ADMIN — ASPIRIN 81 MG: 81 TABLET, COATED ORAL at 14:49

## 2022-03-01 RX ADMIN — CARVEDILOL 6.25 MG: 6.25 TABLET, FILM COATED ORAL at 16:42

## 2022-03-01 RX ADMIN — TETROFOSMIN 11.3 MILLICURIE: 1.38 INJECTION, POWDER, LYOPHILIZED, FOR SOLUTION INTRAVENOUS at 11:15

## 2022-03-01 NOTE — PROGRESS NOTES
[address]   Progress Note  Cardiology      HPI: Ms. Jonathan Jett is being seen today for f/u undefined cardiomyopathy. She feels tired today but denies specific complaints. Tele reviewed showing NSR 87bpm.      Physical Examination:    Vitals:    03/01/22 0013   BP: 123/72   Pulse: 83   Resp: 22   Temp: 97.9 °F (36.6 °C)   SpO2: 90%          Constitutional and General Appearance: NAD   Respiratory:  · Normal excursion and expansion without use of accessory muscles  · Resp Auscultation: Normal breath sounds without dullness  Cardiovascular:  · The apical impulses not displaced  · Heart tones are crisp and normal  · Cervical veins are not engorged  · The carotid upstroke is normal in amplitude and contour without delay or bruit  · Normal S1S2, No S3, No Murmur  · Peripheral pulses are symmetrical and full  · There is no clubbing, cyanosis of the extremities.   · No edema  · Pedal Pulses: 2+ and equal   Abdomen:  · No masses or tenderness  · Liver/Spleen: No Abnormalities Noted  Neurological/Psychiatric:  · Alert and oriented in all spheres  · Moves all extremities well  · No abnormalities of mood, affect, memory, mentation, or behavior are noted  Skin: warm and dry      Lab Results   Component Value Date    WBC 8.4 02/27/2022    HGB 13.1 02/27/2022    HCT 40.4 02/27/2022    MCV 83.1 02/27/2022     02/27/2022     Lab Results   Component Value Date    CREATININE 1.1 02/28/2022    BUN 41 (H) 02/28/2022     02/28/2022    K 4.4 02/28/2022     02/28/2022    CO2 31 02/28/2022     No results found for: INR, PROTIME       EKG 2/23/2022  Sinus tachycardiaBaseline artifactNonspecific ST and T wave abnormality 140 bpm     Echo 2/23/2022  Summary   LV systolic function is reduced with ejection fraction estimated at 35%.   There is mild global hypokinesis.   There is mild concentric left ventricular hypertrophy.   Elevated left ventricular diastolic filling pressure.   The right atrium is mildly dilated.   Mild posterior mitral annular calcification is present.   Aortic valve appears sclerotic but opens adequately.   Moderate mitral regurgitation.   Mild aortic, tricuspid, and pulmonic regurgitation is present.   Systolic pulmonary artery pressure (SPAP) estimated at 76 mmHg (RA pressure   8 mmHg), consistent with severe pulmonary hypertension.  Caroline Greeley is a trivial circumferential pericardial effusion noted.   11/21/2016 EF greater than 70%Dynamic outflow tract obstruction, LVH, DD1,   posterior MAC     CXR 2/23/2022  Findings most consistent with CHF with vascular congestion and interstitial   edema.      CT Chest 2/23/2022  No evidence of pulmonary embolism.       COVID-19 pneumonia as above.         Assessment:  Sasha Cueva is a 81 y.o. patient who presented [facility]23/2022 with complaints of SOB. She was in outpatient getting an ECHO for progressive SOB. Rapid response was called. She has PMH significant for HTN, HLD, and DM. No known cardiac history. Note Echo 11/21/2016 EF=>70%; mild cLVH; grade I DD. Presented to ED in acute respiratory failure. Reports 6 months of SOB mainly with exertion and recently c/o SOB with laying flat and waking up from sleep SOB. Symptoms improved with BiPap, steroids, albuterol, Lasix 40mg IV x 1, and Nitro-Bid ointment. She did test positive for COVID-19. Note EKG ST 140bpm; baseline artifact; nonspecific ST and T wave abnormality. Most recent Echo 2/23/2022 EF=35%; mild global HK; mild cLVH; RA mildly dilated; AV sclerosis;; mild posterior MAC; mod MR; mild AR, TR and KY; severe pulmonary HTN (SPAP) estimated at 76 mmHg; BUN 21.  Cr 1.3. Calcium 11.1. BNP 13,005. Troponin <0.01. Note CXR consistent with CHF. Note Chest CT highly suggestive of COVID-19 pneumonia. Admitted to ICU on BiPAP and now on NC 3L. 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 Recs:  1. Continue demadex 20mg po daily. Total net negative 3L  2. Continue coreg 6.25mg BID, entresto 24-25mg BID, and aldactone 25mg daily for LV dysfunction. 3. Pending lexiscan nuclear stress test to assess myocardial perfusion and LV fcn today   4.. Goal will be to continue GDMT for cardiomyopathy and will have OV and repeat ECHO in couple of months to reassess LVEF. 5. Likely to SNF rehab if nuc stress without concerning issues.        Patient Active Problem List   Diagnosis    Osteoarthritis    Mixed hyperlipidemia    Essential hypertension    Diabetes mellitus ([address]    Vitamin D deficiency    Stress incontinence    Hypercalcemia    Shoulder impingement    Bilateral adhesive capsulitis of shoulders    Ruptured Bakers cyst    Tear of medial meniscus of right knee, current    Hyperuricemia    Acute respiratory failure with hypoxia ([address]    Cardiomyopathy ([address]    SOB (shortness of breath)    COVID-19    Acute respiratory failure with hypoxia and hypercapnia (HCC)    Acute systolic CHF (congestive heart failure) ([address]

## 2022-03-01 NOTE — CARE COORDINATION
INTERDISCIPLINARY PLAN OF CARE CONFERENCE    Date/Time: 3/1/2022 4:07 PM  Completed by: Lisset Fischer RN, Case Management      Patient Name:  Mady Rodgers  YOB: 1935  Admitting Diagnosis: Acute respiratory failure with hypoxia ([facility] [address] [J96.01]  Acute respiratory failure with hypoxia and hypercapnia ([address] [J96.01, J96.02]     Admit Date/Time:  2/23/2022  1:09 PM    Chart reviewed. Interdisciplinary team contacted or reviewed plan related to patient progress and discharge plans. Disciplines included Case Management, Nursing, and Dietitian. Current Status:PCU inpt; Z07+ on 2/23 on RA  PT/OT recommendation for discharge plan of care: SNF    Expected D/C Disposition:  Skilled nursing facility  Confirmed plan with patient and/or family Yes confirmed with: (name) pt and dtr  Met with:pt and dtr  Discharge Plan Comments: Chart reviewed. Met with pt and dtr and provided them with SNF list. Pt and dtr wanting another referral to [facility] for [address]. Discussed that they will not accept pt until after 10 day quarantine which will be 3/6. Pt will require precert. Discussed with Dr Marc Oliveros and she is aware pt may be an avoidable day and she is agreeable as pt and dtr are refusing all covid accepting facilities. LVM for Smitha Carmen @ [facility] at this time, awaiting return call. Follow.     Home O2 in place on admit: No

## 2022-03-01 NOTE — PROGRESS NOTES
Dr Roddy Dancer    Pt's family request call concerning visitation and Covid results and further testing.  Family does not want patient restricted at SNF/Rehab due to possible false Covid test. Daughter Wan Andre @ [phone]

## 2022-03-01 NOTE — PROGRESS NOTES
Dr. Ramirez Amol in to see pt.  All questions answered at this time regarding stress test. Krissy Gomez RN

## 2022-03-01 NOTE — PROGRESS NOTES
Occupational Therapy  Attempted to see patient this pm, off floor for stress test.  Will continue to follow.   Fitzgerald Kitchen, OTR/L [id number]

## 2022-03-01 NOTE — FLOWSHEET NOTE
03/01/22 0929   Vital Signs   Temp 97.4 °F (36.3 °C)   Temp Source Oral   Pulse 90   Heart Rate Source Monitor   Resp 20   /68   BP Location Left lower arm   Level of Consciousness Alert (0)   MEWS Score 1   Oxygen Therapy   SpO2 91 %   O2 Device None (Room air)   O2 Flow Rate (L/min) 0 L/min     Pt. Resting in bed. VS as shown. Pt. Assessment completed- see flow sheet. Pt. Remains NPO for stress test. Pt. denies further needs at this time. Will continue to monitor. Call light is within reach.   Fauzia Parish RN

## 2022-03-01 NOTE — PROGRESS NOTES
Pt's family request a different rehab facility. Worried due to News story about current facility she is scheduled to go to.

## 2022-03-01 NOTE — FLOWSHEET NOTE
Transfer assessment complete. See flowsheet for full assessment. Pt denies any needs, call light within reach.        03/01/22 0013   Vital Signs   Temp 97.9 °F (36.6 °C)   Temp Source Oral   Pulse 83   Heart Rate Source Monitor   Resp 22   /72   BP Location Left upper arm   MAP (mmHg) 89   Patient Position Semi fowlers   Level of Consciousness Alert (0)   MEWS Score 2   Patient Currently in Pain Denies   Oxygen Therapy   SpO2 90 %   O2 Device None (Room air)

## 2022-03-01 NOTE — PROGRESS NOTES
Dr Madison Alvarez    Pt's family request call prior to Stress test. Worried that after previous test, could be problematic. They also request to be present at the hospital during procedure in case something does happen.  Please Call Daughter Massimo Raya @ [phone]

## 2022-03-01 NOTE — PROGRESS NOTES
Transport here to take pt for stress. Pt. Incontinent of stool. Pt. Cleansed at this time. Pt. Stable when leaving the unit. Daughter here. This RN updated her at this time. No other questions.  Raffi Martinez RN

## 2022-03-01 NOTE — ED PROVIDER NOTES
Non-distended. No masses. No organomegaly. No guarding or rebound. MUSCULOSKELETAL: No extremity edema. Compartments soft. No deformity. No tenderness in the extremities. All extremities neurovascularly intact. SKIN: Warm and dry. No acute rashes. NEUROLOGICAL: Alert and oriented. No gross facial drooping. Strength 5/5, sensation intact. PSYCHIATRIC: Anxious      ED course / MDM:   Overall ill appearing patient, in acute distress, presenting for respiratory distress. Patient had acute onset of symptoms while having outpatient cardiac echocardiogram.  Physical exam remarkable for tachycardia, hypoxia, respiratory distress with diminished breath sounds bilaterally. I personally saw the patient and performed a substantive portion of the visit including all aspects of the medical decision making. Differential diagnosis includes but is not limited to: Pneumonia, pneumothorax, pleural effusion, ACS, CHF exacerbation, COPD exacerbation, asthma, pulmonary embolism, arrhythmia, anemia, COVID, hypertensive emergency    Patient arrived in respiratory distress on a nonrebreather. Breath sounds bilaterally but significantly diminished. Patient given a breathing treatment. Chest x-ray obtained and did not show evidence of obvious pneumothorax. Patient had worsening mentation and continued heavy work of breathing so she was placed on BiPAP. Workup showed:    CT CHEST PULMONARY EMBOLISM W CONTRAST   Final Result   No evidence of pulmonary embolism. COVID-19 pneumonia as above. XR CHEST PORTABLE   Final Result   Findings most consistent with CHF with vascular congestion and interstitial   edema. Chest x-ray showed evidence of CHF with vascular congestion and edema. I did have concern for flash pulmonary edema in the setting of hypertensive emergency given the patient's systolic blood pressure was greater than 200. Patient was given Lasix and had topical nitroglycerin.   Breathing and blood pressure did improve with this treatment. BNP was significantly elevated to 13,005. Did have concern for pulmonary embolism given acute onset of symptoms as well as significant tachycardia and hypoxia. D-dimer was elevated. CT PE study did not show evidence of pulmonary embolism. It did show evidence consistent with coronavirus. Patient was positive for coronavirus. She had already received 125 mg of Solu-Medrol. However, procalcitonin was within normal limits. Low suspicion for bacterial pneumonia. The Ekg interpreted by me shows  sinus tachycardia, enpo=303   Axis is   Normal  QTc is  prolonged  Intervals and Durations are unremarkable. ST Segments: nonspecific changes  No previous for comparison    EKG showed no evidence of acute ischemia. Troponin was within normal limits. Patient denies chest pain. At this time, she has other causes of her symptoms that are more likely. Patient does still require echocardiogram during hospitalization and can have further cardiac work-up as needed. Symptoms consistent with COPD exacerbation. Patient is a history of smoking but no known diagnosis of COPD. Blood gas showed acidemia and hypercarbia. Patient received breathing treatments and Solu-Medrol and BiPAP for treatment, with improvement of symptoms. No significant electrolyte abnormalities or evidence of kidney dysfunction. Liver function testing unremarkable    Mild leukocytosis to 12.2. No anemia or thrombocytopenia. Based on results of work-up, I am concerned for respiratory failure. Exact etiology unknown, likely combined COPD, Covid, heart failure with pulmonary edema. At this time, do feel the patient requires admission for further work-up and management.   Discussed the patient with hospital team.  Significant improvement in patient's mentation and breathing with initial treatments    I spent a total of 45 minutes of critical care time in obtaining history, performing a physical exam, bedside monitoring of interventions, collecting and interpreting tests and discussion with consultants but not including time spent performing procedures. Clinical Concern respiratory failure, hypercapnia with altered mentation, pulmonary edema, Covid, hypertensive emergency    Intervention BiPAP, steroids, Lasix, nitroglycerin        CLINICAL IMPRESSION  1. Acute respiratory failure with hypoxia and hypercapnia (HCC)    2. Acute pulmonary edema (HCC)    3. COVID-19    4. Hypertensive emergency        Blood pressure 159/85, pulse 100, temperature source Oral, resp. rate 23, weight 193 lb 12.6 oz (87.9 kg), SpO2 99 %, not currently breastfeeding. Matti Perez was admitted in stable condition. All diagnostic, treatment, and disposition decisions were made by myself in conjunction with the advanced practice provider. For all further details of the patient's emergency department visit, please see the advanced practice provider's documentation. Comment: Please note this report has been produced using speech recognition software and may contain errors related to that system including errors in grammar, punctuation, and spelling, as well as words and phrases that may be inappropriate. If there are any questions or concerns please feel free to contact the dictating provider for clarification.         Anali Henry MD  03/01/22 5362

## 2022-03-01 NOTE — PROGRESS NOTES
I personally reviewed nuc study---see below        Based on abnormal nuclear imaging study, new cardiomyopathy, and SOB symptoms I recommend left heart cath for definitive evaluation of coronary arteries. Risks, benefits, expectations, and alternative treatments were discussed. Questions appropriately answered. Azam Dewey agrees to proceed and verbalized understanding. Her daughter, Zenon Herrmann, was present and agrees to procedure also. Will arrange for procedure tomorrow. Will hold demadex and give some IVF in anticipation of cath. I d/w Dr. Teressa Bone, interventional partner, and he agrees to cath. Reneemeagan Bobby 3/1/22  Summary    Moderate sized inferolateral and lateral partial reversibility defects    consistent with ischemia and infarction in the territory of the mid LCx    and/or RCA. LV function is moderately reduced with global hypokinesis and    ejection fraction of 32%. HIgher risk abnormal study.

## 2022-03-01 NOTE — PROGRESS NOTES
IM Progress Note    Admit Date:  2/23/2022    Patient admitted with acute hypoxic respiratory failure, COVID-19 pneumonia. vaccinated patient. Initially admitted to ICU, required BiPAP. O2 sats have improved now. transferred to PCU. Back on demadex now   She has new cardiomyopathy and IV Lasix given for possible CHF on admission. Currently she is appears compensated, Lasix has been held due to elevated creatinine. Subjective:  Ms. Fili Lincoln is stable today. Oxygen saturations remained stable on 2 L. Lars Brittle No chest pains or shortness of breath  She completed her stress test today. Objective:   /64   Pulse 101   Temp 97.9 °F (36.6 °C) (Oral)   Resp 20   Wt 193 lb 12.6 oz (87.9 kg)   SpO2 91%   Breastfeeding No   BMI 34.33 kg/m²       Intake/Output Summary (Last 24 hours) at 3/1/2022 1526  Last data filed at 3/1/2022 1129  Gross per 24 hour   Intake 0 ml   Output 150 ml   Net -150 ml         Physical Exam:   Patient seen in droplet plus precautions  General:  Obese elderly female up in bed  Awake, alert, NAD  Skin:  Warm and dry  Neck:  JVD absent. Neck supple  Chest:  Clear to auscultation, respiration easy. No wheezes, rales or rhonchi. Cardiovascular:  RRR ,S1S2 normal  Abdomen:  Soft, non tender, non distended, BS +  Extremities:  Trace benja LE edema resolved  Intact peripheral pulses.  Brisk cap refill, < 2 secs  Neuro: non focal      Medications:   Scheduled Meds:   sacubitril-valsartan  1 tablet Oral BID    carvedilol  6.25 mg Oral BID WC    torsemide  20 mg Oral Daily    spironolactone  25 mg Oral Lunch    docusate sodium  100 mg Oral Daily    polyethylene glycol  17 g Oral Daily    enoxaparin  40 mg SubCUTAneous Daily    aspirin  81 mg Oral Daily    sodium chloride flush  5-40 mL IntraVENous 2 times per day    insulin lispro  0-12 Units SubCUTAneous Q4H    Vitamin D  2,000 Units Oral Daily    ascorbic acid  500 mg Oral BID       Continuous Infusions:   dextrose      sodium chloride         Data:  CBC:   Recent Labs     02/27/22 0433   WBC 8.4   RBC 4.86   HGB 13.1   HCT 40.4   MCV 83.1   RDW 15.2        BMP:   Recent Labs     02/27/22  0433 02/28/22 0419 03/01/22  0412    139 139   K 4.4 4.4 4.3    100 98*   CO2 30 31 24   BUN 34* 41* 43*   CREATININE 1.1 1.1 1.2       Cultures  COVID PCR done twice. One is positive and one is negative. Influenza A and B not detected      Radiology  CT CHEST PULMONARY EMBOLISM W CONTRAST   Final Result   No evidence of pulmonary embolism. COVID-19 pneumonia as above. XR CHEST PORTABLE   Final Result   Findings most consistent with CHF with vascular congestion and interstitial   edema. NM Cardiac Stress Test Nuclear Imaging    (Results Pending)       ECHO      Summary   LV systolic function is reduced with ejection fraction estimated at 35%. There is mild global hypokinesis. There is mild concentric left ventricular hypertrophy. Elevated left ventricular diastolic filling pressure. The right atrium is mildly dilated. Mild posterior mitral annular calcification is present. Aortic valve appears sclerotic but opens adequately. Moderate mitral regurgitation. Mild aortic, tricuspid, and pulmonic regurgitation is present. Systolic pulmonary artery pressure (SPAP) estimated at 76 mmHg (RA pressure   8 mmHg), consistent with severe pulmonary hypertension. There is a trivial circumferential pericardial effusion noted. 11/21/2016 EF greater than 70%Dynamic outflow tract obstruction, LVH, DD1,   posterior MAC       Assessment:  Principal Problem:    Acute respiratory failure with hypoxia (HCC)  Active Problems:    Cardiomyopathy (HCC)    SOB (shortness of breath)    COVID-19    Acute respiratory failure with hypoxia and hypercapnia (HCC)    Acute systolic CHF (congestive heart failure) (HCC)  Resolved Problems:    * No resolved hospital problems.  *      Plan:    # Acute respiratory failure with hypoxia and hypercapnia  -Secondary to COVID-19 pneumonia, CHF  -Admitted to ICU, s/p BiPAP. She has improved now. Oxygen saturation stable on 2L  - transferred  to PCU    #COVID-19 pneumonia  -Vaccinated patient  -Covid testing done twice. One was positive and one was negative. Both were PCR studies   - CT chest did show changes consistent with Covid pneumonia, no PE.  -Oxygen saturation stable;  keep on droplet plus precautions for now  -She got Decadron for 2 days and then subsequently stopped  -Continue inhaled bronchodilators   Due to discrepancy in testing plan is to keep in droplet plus precautions for 10 days from date of diagnosis( 2/23)    #Acute systolic CHF  # cardiomyopathy- likely ischemic  #Severe pulmonary hypertension  -Patient with shortness of breath for several months, she had outpatient echocardiogram done -> sent to the ER from echo department for increased shortness of breath.  -Echo shows decreased ejection fraction 35% with pulmonary hypertension.  -Cardiology consulted- for ischemic eval  -IV lasix changed to demadex 20 mg daily   - Coreg and  low dose Entresto 24/26 mg bid added  Monitor renal function. Check cardiac stress test today    # DM-2  -On sliding scale insulin  - repeat hemoglobin A1c 6.4%    # Hypercalcemia  - monitor with diuresis  Calcium levels have improved now.  11.1-> 10.6     #Obesity     DVT Prophylaxis: Lovenox  ADULT DIET; Regular; Low Fat/Low Chol/High Fiber/2 gm Na  Code Status: Full Code    Disposition  SNF placement  Await cardiac stress testing results       addendum   stress test abnormal    Summary    Moderate sized inferolateral and lateral partial reversibility defects    consistent with ischemia and infarction in the territory of the mid LCx    and/or RCA. LV function is moderately reduced with global hypokinesis and    ejection fraction of 32%. HIgher risk abnormal study         D/W cardiology.    plan is for transfer to [facility] in AM for cardiac cath

## 2022-03-01 NOTE — PROGRESS NOTES
Patient transferred from ICU to [facility]. Patient oriented to room, call light, bed rails, phone, lights and bathroom. Patient instructed about the schedule of the day including: vital sign frequency, lab draws, possible tests, frequency of MD and staff rounds, daily weights, I &O's and prescribed diet. Bed alarm in place, patient aware of placement and reason. Telemetry box in place, patient aware of placement and reason. Bed locked, in lowest position, side rails up 2/4, call light within reach. Recliner Assessment  Patient is not able to demonstrated the ability to move from a reclining position to an upright position within the recliner. however patient is alert, oriented and able to provide informed consent    4 Eyes Skin Assessment     The patient is being assess for   Transfer to New Unit    I agree that 2 RN's have performed a thorough Head to Toe Skin Assessment on the patient. ALL assessment sites listed below have been assessed. Areas assessed for pressure by both nurses:   [x]   Head, Face, and Ears   [x]   Shoulders, Back, and Chest, Abdomen  [x]   Arms, Elbows, and Hands   [x]   Coccyx, Sacrum, and Ischium  [x]   Legs, Feet, and Heels        Excoriation in groin and abdominal folds, buttocks and coccyx red but blanchable. **SHARE this note so that the co-signing nurse is able to place an eSignature**    Co-signer eSignature: Electronically signed by Sheron Mcdaniels RN on 3/1/22 at 5:22 AM EST    Does the Patient have Skin Breakdown related to pressure?   No              Javier Prevention initiated:  No   Wound Care Orders initiated:  No      [facility] nurse consulted for Pressure Injury (Stage 3,4, Unstageable, DTI, NWPT, Complex wounds)and New or Established Ostomies:  NA      Primary Nurse eSignature: Electronically signed by Rufino Sweet RN on 3/1/22 at 12:16 AM EST

## 2022-03-01 NOTE — PROGRESS NOTES
Unit transfer and nursing assignment change, bedside report given to [facility]. Pt exhibits no s/s of distress. Pt is currently a PCU pt, but just now getting bed for that unit. Care has been transferred at this time.

## 2022-03-02 ENCOUNTER — HOSPITAL ENCOUNTER (INPATIENT)
Age: 87
LOS: 6 days | Discharge: NURSING, REHAB OR LONG-TERM CARE | DRG: 286 | End: 2022-03-08
Attending: INTERNAL MEDICINE | Admitting: INTERNAL MEDICINE
Payer: MEDICARE

## 2022-03-02 VITALS
OXYGEN SATURATION: 94 % | RESPIRATION RATE: 17 BRPM | HEART RATE: 73 BPM | DIASTOLIC BLOOD PRESSURE: 63 MMHG | SYSTOLIC BLOOD PRESSURE: 117 MMHG | WEIGHT: 192.5 LBS | TEMPERATURE: 97 F | BODY MASS INDEX: 34.1 KG/M2

## 2022-03-02 DIAGNOSIS — N17.9 AKI (ACUTE KIDNEY INJURY) (HCC): Primary | ICD-10-CM

## 2022-03-02 DIAGNOSIS — I25.5 ISCHEMIC CARDIOMYOPATHY: ICD-10-CM

## 2022-03-02 PROBLEM — J96.01 ACUTE RESPIRATORY FAILURE WITH HYPOXIA (HCC): Status: RESOLVED | Noted: 2022-02-23 | Resolved: 2022-03-02

## 2022-03-02 LAB
ANION GAP SERPL CALCULATED.3IONS-SCNC: 11 MMOL/L (ref 3–16)
BUN BLDV-MCNC: 50 MG/DL (ref 7–20)
CALCIUM SERPL-MCNC: 10.1 MG/DL (ref 8.3–10.6)
CHLORIDE BLD-SCNC: 101 MMOL/L (ref 99–110)
CO2: 31 MMOL/L (ref 21–32)
CREAT SERPL-MCNC: 1.5 MG/DL (ref 0.6–1.2)
GFR AFRICAN AMERICAN: 40
GFR NON-AFRICAN AMERICAN: 33
GLUCOSE BLD-MCNC: 129 MG/DL (ref 70–99)
GLUCOSE BLD-MCNC: 132 MG/DL (ref 70–99)
GLUCOSE BLD-MCNC: 134 MG/DL (ref 70–99)
GLUCOSE BLD-MCNC: 138 MG/DL (ref 70–99)
GLUCOSE BLD-MCNC: 140 MG/DL (ref 70–99)
GLUCOSE BLD-MCNC: 150 MG/DL (ref 70–99)
GLUCOSE BLD-MCNC: 151 MG/DL (ref 70–99)
GLUCOSE BLD-MCNC: 154 MG/DL (ref 70–99)
HCT VFR BLD CALC: 39.3 % (ref 36–48)
HEMOGLOBIN: 12.7 G/DL (ref 12–16)
MCH RBC QN AUTO: 26.8 PG (ref 26–34)
MCHC RBC AUTO-ENTMCNC: 32.2 G/DL (ref 31–36)
MCV RBC AUTO: 83.2 FL (ref 80–100)
PDW BLD-RTO: 15.2 % (ref 12.4–15.4)
PERFORMED ON: ABNORMAL
PLATELET # BLD: 270 K/UL (ref 135–450)
PMV BLD AUTO: 8.8 FL (ref 5–10.5)
POTASSIUM SERPL-SCNC: 3.8 MMOL/L (ref 3.5–5.1)
RBC # BLD: 4.72 M/UL (ref 4–5.2)
SODIUM BLD-SCNC: 143 MMOL/L (ref 136–145)
WBC # BLD: 8.9 K/UL (ref 4–11)

## 2022-03-02 PROCEDURE — 2580000003 HC RX 258: Performed by: INTERNAL MEDICINE

## 2022-03-02 PROCEDURE — 93005 ELECTROCARDIOGRAM TRACING: CPT | Performed by: HOSPITALIST

## 2022-03-02 PROCEDURE — 6370000000 HC RX 637 (ALT 250 FOR IP): Performed by: INTERNAL MEDICINE

## 2022-03-02 PROCEDURE — 85027 COMPLETE CBC AUTOMATED: CPT

## 2022-03-02 PROCEDURE — 99239 HOSP IP/OBS DSCHRG MGMT >30: CPT

## 2022-03-02 PROCEDURE — 99223 1ST HOSP IP/OBS HIGH 75: CPT | Performed by: INTERNAL MEDICINE

## 2022-03-02 PROCEDURE — 36415 COLL VENOUS BLD VENIPUNCTURE: CPT

## 2022-03-02 PROCEDURE — 6370000000 HC RX 637 (ALT 250 FOR IP): Performed by: HOSPITALIST

## 2022-03-02 PROCEDURE — 1200000000 HC SEMI PRIVATE

## 2022-03-02 PROCEDURE — 94640 AIRWAY INHALATION TREATMENT: CPT

## 2022-03-02 PROCEDURE — 80048 BASIC METABOLIC PNL TOTAL CA: CPT

## 2022-03-02 RX ORDER — CARVEDILOL 6.25 MG/1
6.25 TABLET ORAL 2 TIMES DAILY WITH MEALS
Status: DISCONTINUED | OUTPATIENT
Start: 2022-03-02 | End: 2022-03-08 | Stop reason: HOSPADM

## 2022-03-02 RX ORDER — ALBUTEROL SULFATE 90 UG/1
2 AEROSOL, METERED RESPIRATORY (INHALATION) EVERY 4 HOURS PRN
Status: DISCONTINUED | OUTPATIENT
Start: 2022-03-02 | End: 2022-03-08 | Stop reason: HOSPADM

## 2022-03-02 RX ORDER — SODIUM CHLORIDE 0.9 % (FLUSH) 0.9 %
10 SYRINGE (ML) INJECTION PRN
Status: DISCONTINUED | OUTPATIENT
Start: 2022-03-02 | End: 2022-03-08 | Stop reason: HOSPADM

## 2022-03-02 RX ORDER — ASCORBIC ACID 500 MG
500 TABLET ORAL 2 TIMES DAILY
Status: DISCONTINUED | OUTPATIENT
Start: 2022-03-02 | End: 2022-03-08 | Stop reason: HOSPADM

## 2022-03-02 RX ORDER — SPIRONOLACTONE 25 MG/1
25 TABLET ORAL
Status: CANCELLED | OUTPATIENT
Start: 2022-03-02

## 2022-03-02 RX ORDER — SODIUM CHLORIDE 0.9 % (FLUSH) 0.9 %
10 SYRINGE (ML) INJECTION PRN
Status: CANCELLED | OUTPATIENT
Start: 2022-03-02

## 2022-03-02 RX ORDER — ALLOPURINOL 100 MG/1
100 TABLET ORAL DAILY
Status: CANCELLED | OUTPATIENT
Start: 2022-03-02

## 2022-03-02 RX ORDER — SODIUM CHLORIDE 9 MG/ML
INJECTION, SOLUTION INTRAVENOUS CONTINUOUS
Status: DISCONTINUED | OUTPATIENT
Start: 2022-03-02 | End: 2022-03-03

## 2022-03-02 RX ORDER — ASCORBIC ACID 500 MG
500 TABLET ORAL 2 TIMES DAILY
Status: CANCELLED | OUTPATIENT
Start: 2022-03-02

## 2022-03-02 RX ORDER — SPIRONOLACTONE 25 MG/1
25 TABLET ORAL
Qty: 30 TABLET | Refills: 0 | Status: CANCELLED | OUTPATIENT
Start: 2022-03-02

## 2022-03-02 RX ORDER — SODIUM CHLORIDE 0.9 % (FLUSH) 0.9 %
5-40 SYRINGE (ML) INJECTION EVERY 12 HOURS SCHEDULED
Status: CANCELLED | OUTPATIENT
Start: 2022-03-02

## 2022-03-02 RX ORDER — POTASSIUM CHLORIDE 20 MEQ/1
40 TABLET, EXTENDED RELEASE ORAL PRN
Status: CANCELLED | OUTPATIENT
Start: 2022-03-02

## 2022-03-02 RX ORDER — NICOTINE POLACRILEX 4 MG
15 LOZENGE BUCCAL PRN
Status: DISCONTINUED | OUTPATIENT
Start: 2022-03-02 | End: 2022-03-08 | Stop reason: HOSPADM

## 2022-03-02 RX ORDER — POTASSIUM CHLORIDE 7.45 MG/ML
10 INJECTION INTRAVENOUS PRN
Status: DISCONTINUED | OUTPATIENT
Start: 2022-03-02 | End: 2022-03-08 | Stop reason: HOSPADM

## 2022-03-02 RX ORDER — DOCUSATE SODIUM 100 MG/1
100 CAPSULE, LIQUID FILLED ORAL DAILY
Status: DISCONTINUED | OUTPATIENT
Start: 2022-03-03 | End: 2022-03-08 | Stop reason: HOSPADM

## 2022-03-02 RX ORDER — SODIUM CHLORIDE 9 MG/ML
25 INJECTION, SOLUTION INTRAVENOUS PRN
Status: CANCELLED | OUTPATIENT
Start: 2022-03-02

## 2022-03-02 RX ORDER — SODIUM CHLORIDE 0.9 % (FLUSH) 0.9 %
5-40 SYRINGE (ML) INJECTION PRN
Status: DISCONTINUED | OUTPATIENT
Start: 2022-03-02 | End: 2022-03-07 | Stop reason: SDUPTHER

## 2022-03-02 RX ORDER — DEXTROSE MONOHYDRATE 50 MG/ML
100 INJECTION, SOLUTION INTRAVENOUS PRN
Status: DISCONTINUED | OUTPATIENT
Start: 2022-03-02 | End: 2022-03-03 | Stop reason: SDUPTHER

## 2022-03-02 RX ORDER — MULTIVITAMIN/IRON/FOLIC ACID 18MG-0.4MG
1 TABLET ORAL
Status: CANCELLED | OUTPATIENT
Start: 2022-03-03

## 2022-03-02 RX ORDER — POTASSIUM CHLORIDE 7.45 MG/ML
10 INJECTION INTRAVENOUS PRN
Status: CANCELLED | OUTPATIENT
Start: 2022-03-02

## 2022-03-02 RX ORDER — TORSEMIDE 20 MG/1
20 TABLET ORAL DAILY
Status: CANCELLED | OUTPATIENT
Start: 2022-03-02

## 2022-03-02 RX ORDER — SENNA PLUS 8.6 MG/1
1 TABLET ORAL DAILY PRN
Status: DISCONTINUED | OUTPATIENT
Start: 2022-03-02 | End: 2022-03-08 | Stop reason: HOSPADM

## 2022-03-02 RX ORDER — ONDANSETRON 4 MG/1
4 TABLET, ORALLY DISINTEGRATING ORAL EVERY 8 HOURS PRN
Status: DISCONTINUED | OUTPATIENT
Start: 2022-03-02 | End: 2022-03-07 | Stop reason: SDUPTHER

## 2022-03-02 RX ORDER — SODIUM CHLORIDE 0.9 % (FLUSH) 0.9 %
5-40 SYRINGE (ML) INJECTION PRN
Status: CANCELLED | OUTPATIENT
Start: 2022-03-02

## 2022-03-02 RX ORDER — VITAMIN B COMPLEX
2000 TABLET ORAL DAILY
Status: CANCELLED | OUTPATIENT
Start: 2022-03-02

## 2022-03-02 RX ORDER — SODIUM CHLORIDE 0.9 % (FLUSH) 0.9 %
5-40 SYRINGE (ML) INJECTION PRN
Status: DISCONTINUED | OUTPATIENT
Start: 2022-03-02 | End: 2022-03-08 | Stop reason: HOSPADM

## 2022-03-02 RX ORDER — SODIUM CHLORIDE 0.9 % (FLUSH) 0.9 %
10 SYRINGE (ML) INJECTION EVERY 12 HOURS SCHEDULED
Status: DISCONTINUED | OUTPATIENT
Start: 2022-03-02 | End: 2022-03-05

## 2022-03-02 RX ORDER — NICOTINE POLACRILEX 4 MG
15 LOZENGE BUCCAL PRN
Status: CANCELLED | OUTPATIENT
Start: 2022-03-02

## 2022-03-02 RX ORDER — SPIRONOLACTONE 25 MG/1
25 TABLET ORAL
Status: DISCONTINUED | OUTPATIENT
Start: 2022-03-03 | End: 2022-03-04

## 2022-03-02 RX ORDER — POTASSIUM CHLORIDE 20 MEQ/1
40 TABLET, EXTENDED RELEASE ORAL PRN
Status: DISCONTINUED | OUTPATIENT
Start: 2022-03-02 | End: 2022-03-08 | Stop reason: HOSPADM

## 2022-03-02 RX ORDER — CARVEDILOL 6.25 MG/1
6.25 TABLET ORAL 2 TIMES DAILY WITH MEALS
Qty: 60 TABLET | Refills: 0 | Status: CANCELLED | OUTPATIENT
Start: 2022-03-02

## 2022-03-02 RX ORDER — SODIUM CHLORIDE 0.9 % (FLUSH) 0.9 %
5-40 SYRINGE (ML) INJECTION EVERY 12 HOURS SCHEDULED
Status: DISCONTINUED | OUTPATIENT
Start: 2022-03-02 | End: 2022-03-05

## 2022-03-02 RX ORDER — ONDANSETRON 2 MG/ML
4 INJECTION INTRAMUSCULAR; INTRAVENOUS EVERY 6 HOURS PRN
Status: DISCONTINUED | OUTPATIENT
Start: 2022-03-02 | End: 2022-03-07 | Stop reason: SDUPTHER

## 2022-03-02 RX ORDER — SODIUM CHLORIDE 9 MG/ML
INJECTION, SOLUTION INTRAVENOUS CONTINUOUS
Status: CANCELLED | OUTPATIENT
Start: 2022-03-02

## 2022-03-02 RX ORDER — SODIUM CHLORIDE 9 MG/ML
25 INJECTION, SOLUTION INTRAVENOUS PRN
Status: DISCONTINUED | OUTPATIENT
Start: 2022-03-02 | End: 2022-03-07 | Stop reason: SDUPTHER

## 2022-03-02 RX ORDER — ONDANSETRON 2 MG/ML
4 INJECTION INTRAMUSCULAR; INTRAVENOUS EVERY 6 HOURS PRN
Status: CANCELLED | OUTPATIENT
Start: 2022-03-02

## 2022-03-02 RX ORDER — DEXTROSE MONOHYDRATE 50 MG/ML
100 INJECTION, SOLUTION INTRAVENOUS PRN
Status: DISCONTINUED | OUTPATIENT
Start: 2022-03-02 | End: 2022-03-08 | Stop reason: HOSPADM

## 2022-03-02 RX ORDER — ASPIRIN 81 MG/1
81 TABLET ORAL DAILY
Status: CANCELLED | OUTPATIENT
Start: 2022-03-03

## 2022-03-02 RX ORDER — DEXTROSE MONOHYDRATE 25 G/50ML
12.5 INJECTION, SOLUTION INTRAVENOUS PRN
Status: DISCONTINUED | OUTPATIENT
Start: 2022-03-02 | End: 2022-03-02 | Stop reason: SDUPTHER

## 2022-03-02 RX ORDER — ACETAMINOPHEN 650 MG/1
650 SUPPOSITORY RECTAL EVERY 6 HOURS PRN
Status: DISCONTINUED | OUTPATIENT
Start: 2022-03-02 | End: 2022-03-08 | Stop reason: HOSPADM

## 2022-03-02 RX ORDER — ALBUTEROL SULFATE 90 UG/1
2 AEROSOL, METERED RESPIRATORY (INHALATION) 4 TIMES DAILY
Status: DISCONTINUED | OUTPATIENT
Start: 2022-03-02 | End: 2022-03-02

## 2022-03-02 RX ORDER — SODIUM CHLORIDE 9 MG/ML
25 INJECTION, SOLUTION INTRAVENOUS PRN
Status: DISCONTINUED | OUTPATIENT
Start: 2022-03-02 | End: 2022-03-08 | Stop reason: HOSPADM

## 2022-03-02 RX ORDER — VITAMIN B COMPLEX
2000 TABLET ORAL DAILY
Status: DISCONTINUED | OUTPATIENT
Start: 2022-03-03 | End: 2022-03-08 | Stop reason: HOSPADM

## 2022-03-02 RX ORDER — CARVEDILOL 6.25 MG/1
6.25 TABLET ORAL 2 TIMES DAILY WITH MEALS
Status: CANCELLED | OUTPATIENT
Start: 2022-03-02

## 2022-03-02 RX ORDER — BISACODYL 10 MG
10 SUPPOSITORY, RECTAL RECTAL DAILY PRN
Status: CANCELLED | OUTPATIENT
Start: 2022-03-02

## 2022-03-02 RX ORDER — ONDANSETRON 2 MG/ML
4 INJECTION INTRAMUSCULAR; INTRAVENOUS EVERY 6 HOURS PRN
Status: DISCONTINUED | OUTPATIENT
Start: 2022-03-02 | End: 2022-03-08 | Stop reason: HOSPADM

## 2022-03-02 RX ORDER — TORSEMIDE 20 MG/1
20 TABLET ORAL DAILY
Qty: 30 TABLET | Refills: 0 | Status: CANCELLED | OUTPATIENT
Start: 2022-03-02

## 2022-03-02 RX ORDER — MAGNESIUM SULFATE 1 G/100ML
1000 INJECTION INTRAVENOUS PRN
Status: CANCELLED | OUTPATIENT
Start: 2022-03-02

## 2022-03-02 RX ORDER — DOCUSATE SODIUM 100 MG/1
100 CAPSULE, LIQUID FILLED ORAL DAILY
Status: CANCELLED | OUTPATIENT
Start: 2022-03-02

## 2022-03-02 RX ORDER — AMLODIPINE BESYLATE 5 MG/1
5 TABLET ORAL DAILY
Status: CANCELLED | OUTPATIENT
Start: 2022-03-03

## 2022-03-02 RX ORDER — POLYETHYLENE GLYCOL 3350 17 G/17G
17 POWDER, FOR SOLUTION ORAL DAILY
Status: CANCELLED | OUTPATIENT
Start: 2022-03-02

## 2022-03-02 RX ORDER — ALLOPURINOL 100 MG/1
100 TABLET ORAL DAILY
Status: DISCONTINUED | OUTPATIENT
Start: 2022-03-02 | End: 2022-03-08 | Stop reason: HOSPADM

## 2022-03-02 RX ORDER — ASPIRIN 81 MG/1
81 TABLET ORAL DAILY
Status: DISCONTINUED | OUTPATIENT
Start: 2022-03-03 | End: 2022-03-08 | Stop reason: HOSPADM

## 2022-03-02 RX ORDER — MAGNESIUM SULFATE 1 G/100ML
1000 INJECTION INTRAVENOUS PRN
Status: DISCONTINUED | OUTPATIENT
Start: 2022-03-02 | End: 2022-03-08 | Stop reason: HOSPADM

## 2022-03-02 RX ORDER — ONDANSETRON 4 MG/1
4 TABLET, ORALLY DISINTEGRATING ORAL EVERY 8 HOURS PRN
Status: CANCELLED | OUTPATIENT
Start: 2022-03-02

## 2022-03-02 RX ORDER — ASPIRIN 81 MG/1
81 TABLET ORAL DAILY
Status: CANCELLED | OUTPATIENT
Start: 2022-03-02

## 2022-03-02 RX ORDER — ONDANSETRON 4 MG/1
4 TABLET, ORALLY DISINTEGRATING ORAL EVERY 8 HOURS PRN
Status: DISCONTINUED | OUTPATIENT
Start: 2022-03-02 | End: 2022-03-08 | Stop reason: HOSPADM

## 2022-03-02 RX ORDER — NICOTINE POLACRILEX 4 MG
15 LOZENGE BUCCAL PRN
Status: DISCONTINUED | OUTPATIENT
Start: 2022-03-02 | End: 2022-03-03 | Stop reason: SDUPTHER

## 2022-03-02 RX ORDER — SODIUM CHLORIDE 0.9 % (FLUSH) 0.9 %
5-40 SYRINGE (ML) INJECTION EVERY 12 HOURS SCHEDULED
Status: DISCONTINUED | OUTPATIENT
Start: 2022-03-02 | End: 2022-03-08 | Stop reason: HOSPADM

## 2022-03-02 RX ORDER — ACETAMINOPHEN 325 MG/1
650 TABLET ORAL EVERY 6 HOURS PRN
Status: DISCONTINUED | OUTPATIENT
Start: 2022-03-02 | End: 2022-03-08 | Stop reason: HOSPADM

## 2022-03-02 RX ORDER — POLYETHYLENE GLYCOL 3350 17 G/17G
17 POWDER, FOR SOLUTION ORAL DAILY
Status: DISCONTINUED | OUTPATIENT
Start: 2022-03-03 | End: 2022-03-08 | Stop reason: HOSPADM

## 2022-03-02 RX ORDER — DEXTROSE MONOHYDRATE 50 MG/ML
100 INJECTION, SOLUTION INTRAVENOUS PRN
Status: CANCELLED | OUTPATIENT
Start: 2022-03-02

## 2022-03-02 RX ORDER — MAGNESIUM SULFATE IN WATER 40 MG/ML
2000 INJECTION, SOLUTION INTRAVENOUS PRN
Status: DISCONTINUED | OUTPATIENT
Start: 2022-03-02 | End: 2022-03-08 | Stop reason: HOSPADM

## 2022-03-02 RX ORDER — TORSEMIDE 20 MG/1
20 TABLET ORAL DAILY
Status: DISCONTINUED | OUTPATIENT
Start: 2022-03-03 | End: 2022-03-02

## 2022-03-02 RX ORDER — BISACODYL 10 MG
10 SUPPOSITORY, RECTAL RECTAL DAILY PRN
Status: DISCONTINUED | OUTPATIENT
Start: 2022-03-02 | End: 2022-03-08 | Stop reason: HOSPADM

## 2022-03-02 RX ADMIN — Medication 2 PUFF: at 16:18

## 2022-03-02 RX ADMIN — SODIUM CHLORIDE: 9 INJECTION, SOLUTION INTRAVENOUS at 15:08

## 2022-03-02 RX ADMIN — CARVEDILOL 6.25 MG: 6.25 TABLET, FILM COATED ORAL at 17:45

## 2022-03-02 RX ADMIN — CARVEDILOL 6.25 MG: 6.25 TABLET, FILM COATED ORAL at 08:19

## 2022-03-02 RX ADMIN — SODIUM CHLORIDE: 9 INJECTION, SOLUTION INTRAVENOUS at 15:11

## 2022-03-02 RX ADMIN — ASPIRIN 81 MG: 81 TABLET, COATED ORAL at 08:19

## 2022-03-02 RX ADMIN — OXYCODONE HYDROCHLORIDE AND ACETAMINOPHEN 500 MG: 500 TABLET ORAL at 20:36

## 2022-03-02 ASSESSMENT — PAIN SCALES - GENERAL
PAINLEVEL_OUTOF10: 0
PAINLEVEL_OUTOF10: 0

## 2022-03-02 NOTE — FLOWSHEET NOTE
03/01/22 1448   Vital Signs   Temp 97.9 °F (36.6 °C)   Temp Source Oral   Pulse 101   Heart Rate Source Monitor   Resp 20   /64   Level of Consciousness Alert (0)   MEWS Score 2   Oxygen Therapy   SpO2 91 %   O2 Device None (Room air)     Pt. Back from stress test. Pt. Stable at this time. Medications caught up at this time. Pt. denies further needs at this time. Will continue to monitor. Call light is within reach.   Stanislav Gomes RN

## 2022-03-02 NOTE — PROGRESS NOTES
Order to transfer Pt to [facility]  Patient verified knowledge. IV remains for cath lab needs. Telemetry monitor removed and returned to [facility]. Pt left facility in stable condition to [facility] with all of their personal belongings.

## 2022-03-02 NOTE — FLOWSHEET NOTE
Shift assessment complete. See flowsheet for full assessment. Pt denies any needs, call light within reach.        03/1935   Vital Signs   Temp 97.8 °F (36.6 °C)   Temp Source Oral   Pulse 91   Heart Rate Source Monitor   Resp 22   BP (!) 118/50   BP Location Left upper arm   MAP (mmHg) 67   Patient Position Semi fowlers   Level of Consciousness Alert (0)   MEWS Score 2   Patient Currently in Pain Denies   Oxygen Therapy   SpO2 92 %   O2 Device None (Room air)

## 2022-03-02 NOTE — H&P
HOSPITALISTS HISTORY AND PHYSICAL    3/2/2022 3:10 PM    Patient Information:  Belkis Reynoso is a 81 y.o. female [id number]  PCP:  Faye Yadav DO (Tel: [phone] )    Chief complaint: Acute decompensated CHF with abnormal GXT    History of Present Illness:  Tere Coles is a 81 y.o. female who was transferred from [facility] to [facility] for further evaluation and treatment by invasive cardiology. The patient was recently hospitalized in the ICU following COVID-19 diagnosis and subsequent ischemic cardiomyopathy with heart failure. Stress test was performed and noted to be abnormal therefore it was recommended that she be transferred to this facility for further intervention. Cardiac risk factors include: HTN, HLD, CMP, type II DM, and former smoker. Review of epic chart reveals the patient was experiencing worsening dyspnea with respiratory acidosis upon presentation to OSH therefore she expanded to the ICU and placed on BiPAP therapy. Echo was obtained revealing new onset systolic dysfunction in addition to severe PAH. Patient was diagnosed with SARS-CoV-2 during her hospital staY, despite being fully vaccinated and s/p booster x1. Notable labs prior to transfer include: JANAE with BUN 50/1.5 CrCl 29 cc/min, hyperglycemia 151, BNP 9052, and marked hyperlipidemia with statin intolerance. Summary   LV systolic function is reduced with ejection fraction estimated at 35%. There is mild global hypokinesis. There is mild concentric left ventricular hypertrophy. Elevated left ventricular diastolic filling pressure. The right atrium is mildly dilated. Mild posterior mitral annular calcification is present. Aortic valve appears sclerotic but opens adequately. Moderate mitral regurgitation. Mild aortic, tricuspid, and pulmonic regurgitation is present.    Systolic pulmonary artery pressure (SPAP) estimated at 76 mmHg (RA pressure   8 mmHg), consistent with severe pulmonary hypertension. There is a trivial circumferential pericardial effusion noted. 11/21/2016 EF greater than 70%Dynamic outflow tract obstruction, LVH, DD1,   posterior MAC    Lexiscan myoview 3/1/22  Summary    Moderate sized inferolateral and lateral partial reversibility defects    consistent with ischemia and infarction in the territory of the mid LCx    and/or RCA. LV function is moderately reduced with global hypokinesis and    ejection fraction of 32%. HIgher risk abnormal study. REVIEW OF SYSTEMS:   Constitutional: Negative for fever,chills; positive generalized weakness malaise received 2 additional after except  ENT: Negative for headache, rhinorrhea, and sore throat. Respiratory: Positive for shortness of breath, wheezing, and cough  Cardiovascular: Positive for intermittent chest pain, palpitations, and peripheral edema, orthopnea or PND  Gastrointestinal: Negative for N/V/D and abdominal pain; no hematemesis, hematochezia, or melena; no anorexia  Genitourinary: Negative for dysuria, frequency, retention; no incontinence  Hematologic/Lymphatic: Negative for bleeding tendency/excessive bruising  Musculoskeletal: Positive for myalgias and arthalgias; able to ambulate without difficulty  Neurologic: Negative for LOC, seizure activity, paresthesias, dysarthria, vertigo, and gait disturbance  Skin: Negative for itching,rash, decubitus  Psychiatric: Negative for depression,anxiety, and agitation; no hallucinations; denies SI/HI  Endocrine: Type II DM managed with Metformin  Past Medical History:   has a past medical history of Hypertension, Mixed hyperlipidemia, and Type II or unspecified type diabetes mellitus without mention of complication, not stated as uncontrolled. Past Surgical History:   has a past surgical history that includes lipoma resection and knee surgery (Right, 11/22/2016).      Medications:  No current facility-administered medications on file prior to encounter. Current Outpatient Medications on File Prior to Encounter   Medication Sig Dispense Refill    amLODIPine (NORVASC) 5 MG tablet Take 5 mg by mouth daily      lisinopril-hydroCHLOROthiazide (PRINZIDE;ZESTORETIC) 10-12.5 MG per tablet Take 1 tablet by mouth 2 times daily      allopurinol (ZYLOPRIM) 100 MG tablet Take 100 mg by mouth daily      furosemide (LASIX) 20 MG tablet Take 1 tablet by mouth every other day 15 tablet 0    ACCU-CHEK JERRY PLUS strip USE TO TEST BLOOD SUGAR ONCE DAILY 100 strip 5    Handicap Placard MISC 11/9/21-11/8/26 1 each 0    metFORMIN (GLUCOPHAGE) 500 MG tablet TAKE 1 TABLET EVERY DAY 90 tablet 1    Multiple Vitamins-Minerals (CENTRUM ADULTS PO) Take by mouth      Glucose Blood (BLOOD GLUCOSE TEST STRIPS) STRP 1 strip by Does not apply route three times daily Patient requesting True Results brand 100 strip 3    glucose monitoring kit (FREESTYLE) monitoring kit 1 kit by Does not apply route daily as needed (not prn) Dx E11.9-uses once daily-needs Acucheck Jerry Plus 1 kit 0    aspirin 81 MG EC tablet Take 81 mg by mouth daily. Indications: OTC         Allergies: Allergies   Allergen Reactions    Pcn [Penicillins]     Statins Other (See Comments)     Leg pains    Ibuprofen Rash     Tolerates low dose enteric-coated ASA. Social History:   reports that she quit smoking about 38 years ago. She has never used smokeless tobacco. She reports that she does not drink alcohol and does not use drugs. Family History:  family history is not on file.      Physical Exam:  BP (!) 153/70   Pulse 84   Temp 97.9 °F (36.6 °C) (Oral)   SpO2 91%     General appearance: Pleasant overweight elderly female resting comfortably in bed  Eyes: Sclera clear without conjunctival injection; PERRLA; EOMI  ENT: Mucous membranes moist without thrush; normal dentition  Neck: Supple without meningismus; no goiter; no carotid bruit bilaterally  Cardiovascular: Regular rhythm without ectopy; normal S1-S2 with no murmurs; trace BLE peripheral edema  Respiratory: No tachypnea; CTAB with diminished air exchange, no wheeze, rhonchi or rales; I:E intact  Gastrointestinal: Abdomen soft, non-tender, not distended; bowel sounds normal; no masses/organomegaly appreciated  Musculoskeletal: FROM spine and extremities x4; no gross deformity  Neurology: A&O x3; cranial nerves 2-12 grossly intact; motor 5/5  BUE/BLE; no seizure activity; finger-to-nose/heel-to-shin intact; no pronator drift  Psychiatry: Well-groomed with good eye contact; appropriate affect; no visual/auditory hallucination  Skin: Warm, dry, normal turgor, no rash  PV: 2/4 radial and dorsalis pedis bilaterally; brisk capillary refill    Labs:  CBC:   Lab Results   Component Value Date    WBC 8.9 03/02/2022    RBC 4.72 03/02/2022    HGB 12.7 03/02/2022    HCT 39.3 03/02/2022    MCV 83.2 03/02/2022    MCH 26.8 03/02/2022    MCHC 32.2 03/02/2022    RDW 15.2 03/02/2022     03/02/2022    MPV 8.8 03/02/2022     BMP:    Lab Results   Component Value Date     03/02/2022    K 3.8 03/02/2022    K 4.5 02/23/2022     03/02/2022    CO2 31 03/02/2022    BUN 50 03/02/2022    CREATININE 1.5 03/02/2022    CALCIUM 10.1 03/02/2022    GFRAA 40 03/02/2022    GFRAA >60 05/08/2013    LABGLOM 33 03/02/2022    GLUCOSE 151 03/02/2022     No orders to display         EKG: Pending    I visualized CXR images and EKG strips personally and agree with documented interpretation    Discussed case  with ED provider    Problem List:  Principal Problem:    Acute systolic CHF (congestive heart failure) (HCC)  Active Problems:    COVID-19    Abnormal stress test    Ischemic cardiomyopathy    Mixed hyperlipidemia    Diabetes mellitus ([facility]    Primary hypertension    JANAE (acute kidney injury) ([facility]  Resolved Problems:    * No resolved hospital problems.  *        Consults:  IP CONSULT TO HEART FAILURE NURSE/COORDINATOR  IP CONSULT TO CARDIOLOGY      Assessment/Plan:     Abnormal nuclear GXT  -Admit to telemetry floor with serial cardiac enzymes to be obtained overnight  -Continue ACS a 81 mg daily   -HLD untreated due to intolerance to multiple statins  -Patient to remain n.p.o. for [facility] later this afternoon; CARDS consulted and aware the patient is in-house    Acute systolic CHF  -Admit to floor for continuous telemetry monitoring and strict I's & O's  -Continue current regimen with Coreg, Demadex, Aldactone, and Entresto  -Results of echo obtained prior to transfer documented above  -2G Na and fluid restriction dietary modifications in place    Type II DM  -A1c ordered with results pending at time of dictation  -Home hypoglycemic medications placed on temporary hold  -POC glucose every 4 hours while NPO, with medium dose Humalog SSI  -Carb restriction recommended once diet resumed    COVID-19 in former smoker  -Continuous pulse oximetry monitoring initiated with PRN supplemental O2   -Encourage aggressive pulmonary toilet including incentive spirometry every 4H while awake  -Combivent scheduled Q4H while awake    DVT prophylaxis-renally dosed Lovenox 30 mg daily  Code status-full code  Diet-n.p.o. pending [facility]  IV access-PIV established in ED      Admit as inpatient. I anticipate hospitalization spanning more than two midnights for investigation and treatment of the above medically necessary diagnoses. Comment: Please note this report has been produced using speech recognition software and may contain errors related to that system including errors in grammar, punctuation, and spelling, as well as words and phrases that may be inappropriate. If there are any questions or concerns please feel free to contact the dictating provider for clarification.          Pasquale Ward MD    3/2/2022 3:10 PM

## 2022-03-02 NOTE — CARE COORDINATION
DISCHARGE ORDER  Date/Time 3/2/2022 9:20 AM  Completed by: Ronan Adams RN, Case Management    Patient Name: Tierra Pollock      : 1935  Admitting Diagnosis: Acute respiratory failure with hypoxia ([address] [J96.01]  Acute respiratory failure with hypoxia and hypercapnia ([address] [J96.01, J96.02]      Admit order Date and Status:2022  (verify MD's last order for status of admission)      Noted discharge order. If applicable PT/OT recommendation at Discharge: SNF  DME recommendation by PT/OT:SNF  Confirmed discharge plan  (pt): Yes  with whom_____________pt__  If pt confirmed DC plan does family need to be contacted by CM No if yes who__no____  Discharge Plan: Reviewed chart. Role of discharge planner explained and patient verbalized understanding. Discharge order is noted. Pt is being d/c'd to [facility] today for card cath. Pt's O2 sats are 93% on RA. Pt had a referral to [facility] could take pt after day 10 of covid. Pt is covid pos as of 2022. CM called and left a VM for Shannan Milner with [facility] regarding pt being transferred to [facility] for card cath today so that she can be followed at [facility]. No further discharge needs needed or noted. Reviewed chart. Role of discharge planner explained and patient verbalized understanding. Discharge order is noted. Has Home O2 in place on admit:  No  Informed of need to bring portable home O2 tank on day of discharge for nursing to connect prior to leaving:   No  Verbalized agreement/Understanding:   No    Discharge timeout done with Dominga Moura RN. All discharge needs and concerns addressed.

## 2022-03-02 NOTE — FLOWSHEET NOTE
Pt appears to be resting comfortably. VSS. Per pt, no new needs at this time.         03/02/22 0110   Vital Signs   Temp 97 °F (36.1 °C)   Temp Source Axillary   Pulse 88   Heart Rate Source Monitor   Resp 22   BP (!) 147/61   MAP (mmHg) 83   Patient Position Semi fowlers   Level of Consciousness Alert (0)   MEWS Score 2   Patient Currently in Pain Denies   Oxygen Therapy   SpO2 93 %   O2 Device None (Room air)

## 2022-03-02 NOTE — RT PROTOCOL NOTE
RT Inhaler-Nebulizer Bronchodilator Protocol Note    There is a bronchodilator order in the chart from a provider indicating to follow the RT Bronchodilator Protocol and there is an Initiate RT Inhaler-Nebulizer Bronchodilator Protocol order as well (see protocol at bottom of note). CXR Findings:  No results found. The findings from the last RT Protocol Assessment were as follows:   History Pulmonary Disease: None or smoker <15 pack years  Respiratory Pattern: Regular pattern and RR 12-20 bpm  Breath Sounds: Slightly diminished and/or crackles  Cough: Strong, spontaneous, non-productive  Indication for Bronchodilator Therapy: Decreased or absent breath sounds  Bronchodilator Assessment Score: 2    Aerosolized bronchodilator medication orders have been revised according to the RT Inhaler-Nebulizer Bronchodilator Protocol below. Respiratory Therapist to perform RT Therapy Protocol Assessment initially then follow the protocol. Repeat RT Therapy Protocol Assessment PRN for score 0-3 or on second treatment, BID, and PRN for scores above 3. No Indications - adjust the frequency to every 6 hours PRN wheezing or bronchospasm, if no treatments needed after 48 hours then discontinue using Per Protocol order mode. If indication present, adjust the RT bronchodilator orders based on the Bronchodilator Assessment Score as indicated below. Use Inhaler orders unless patient has one or more of the following: on home nebulizer, not able to hold breath for 10 seconds, is not alert and oriented, cannot activate and use MDI correctly, or respiratory rate 25 breaths per minute or more, then use the equivalent nebulizer order(s) with same Frequency and PRN reasons based on the score. If a patient is on this medication at home then do not decrease Frequency below that used at home.     0-3 - enter or revise RT bronchodilator order(s) to equivalent RT Bronchodilator order with Frequency of every 4 hours PRN for wheezing or increased work of breathing using Per Protocol order mode. 4-6 - enter or revise RT Bronchodilator order(s) to two equivalent RT bronchodilator orders with one order with BID Frequency and one order with Frequency of every 4 hours PRN wheezing or increased work of breathing using Per Protocol order mode. 7-10 - enter or revise RT Bronchodilator order(s) to two equivalent RT bronchodilator orders with one order with TID Frequency and one order with Frequency of every 4 hours PRN wheezing or increased work of breathing using Per Protocol order mode. 11-13 - enter or revise RT Bronchodilator order(s) to one equivalent RT bronchodilator order with QID Frequency and an Albuterol order with Frequency of every 4 hours PRN wheezing or increased work of breathing using Per Protocol order mode. Greater than 13 - enter or revise RT Bronchodilator order(s) to one equivalent RT bronchodilator order with every 4 hours Frequency and an Albuterol order with Frequency of every 2 hours PRN wheezing or increased work of breathing using Per Protocol order mode. RT to enter RT Home Evaluation for COPD & MDI Assessment order using Per Protocol order mode.     Electronically signed by Jonh Avery RCP on 3/2/2022 at 4:17 PM

## 2022-03-02 NOTE — PROGRESS NOTES
Family updated on care plan. Addressed all Pt and family concerns at this time.      Mushtaq Hernandes RN

## 2022-03-02 NOTE — FLOWSHEET NOTE
03/02/22 0800   Vital Signs   Temp 96.9 °F (36.1 °C)   Temp Source Oral   Pulse 83   Heart Rate Source Monitor   Resp 18   /68   BP Location Left upper arm   MAP (mmHg) 78   Patient Position Semi fowlers   Level of Consciousness Alert (0)   MEWS Score 1   Patient Currently in Pain Denies   Pain Assessment   Pain Assessment 0-10   Pain Level 0   Oxygen Therapy   SpO2 93 %   O2 Device None (Room air)   O2 Flow Rate (L/min) 0 L/min       Pt assessment complete; see flow sheets. Vitals completed. Pt NPO for heart cath today. PO ASA and Coreg given per Protocol. Pt denies any chest pain or pressure. Repositioned in bed by self. Pt stable.     Arthur Weaver RN

## 2022-03-02 NOTE — CARE COORDINATION
Received request to cosign note from Henry Montalvo on 02/28/22 at 8:44pm regarding a new facility.      Cosigned note and [facility] is aware of family wanting a new SNF; see note from RN CM on 3/1

## 2022-03-02 NOTE — DISCHARGE SUMMARY
Name:  Randi Gipson  Room:  /7031-02  MRN:    [id number]    Discharge Summary      This discharge summary is in conjunction with a complete physical exam done on the day of discharge. Discharging Provider: Homa Lopez PA-C  Attending Physician: Dr. Melissa Stack MD     Admit: 2/23/2022  Discharge:  3/2/2022     HPI taken from admission H&P:    81 y.o. female who presented to the hospital with a chief complaint of shortness of breath. Very pleasant 72-year-old female who was get an outpatient echocardiogram obtained for worsening dyspnea on exertion and at rest.  During her procedure she developed shortness of breath and difficulty breathing. A rapid response was called and she was taken to the emergency department for evaluation. In the emergency department she was tachypneic, hypoxic and acidotic. She was placed on BiPAP and was admitted to the ICU for further care. An echocardiogram obtained revealed new onset systolic dysfunction which was not there in prior echocardiograms with severe pulmonary hypertension. She was also positive for COVID-19. Of note she has been fully vaccinated with a booster. When I saw the patient she was awake, alert and not on BiPAP therapy. She will be observed in the ICU overnight. Diagnoses this Admission and Hospital Course   #Acute systolic CHF  #Cardiomyopathy- ischemic  #Severe pulmonary hypertension  -Patient with shortness of breath for several months, she had outpatient echocardiogram done -> sent to the ER from echo department for increased shortness of breath.  -Echo shows decreased ejection fraction 35% with pulmonary hypertension.   -IV lasix changed to demadex 20 mg daily - Demedex held for now; giving IVF for cardiac cath today   - Coreg and  low dose Entresto 24/26 mg bid added  Lexiscan myoview 3/1/22  Summary    Moderate sized inferolateral and lateral partial reversibility defects    consistent with ischemia and infarction in the territory of the mid LCx    and/or RCA. LV function is moderately reduced with global hypokinesis and    ejection fraction of 32%. HIgher risk abnormal study.   -Cardiology consulted- for ischemic eval  --With abnormal stress test as above, patient will be transferred to [facility] for [facility] today 3/2/22. Patient and daugther agreeable with this plan. #Acute respiratory failure with hypoxia and hypercapnia  -Secondary to COVID-19 pneumonia, CHF  -Admitted to ICU, s/p BiPAP. She improved, transferred  to PCU  -She is stable on room air today     #COVID-19 pneumonia  -Vaccinated patient  -Covid testing done twice. One was positive and one was negative. Both were PCR studies   - CT chest did show changes consistent with Covid pneumonia, no PE.  -Oxygen saturation stable;  keep on droplet plus precautions for now  -She got Decadron for 2 days and then subsequently stopped  -Continue inhaled bronchodilators   Due to discrepancy in testing plan is to keep in droplet plus precautions for 10 days from date of diagnosis( 2/23)     #DM-2  -On sliding scale insulin  -Repeat hemoglobin A1c 6.4%     #Hypercalcemia  -Calcium levels have improved now.  11.1-> 10.6     #Obesity    Procedures (Please Review Full Report for Details)  Jeferson Guerrier 3/1/22  Summary    Moderate sized inferolateral and lateral partial reversibility defects    consistent with ischemia and infarction in the territory of the mid LCx    and/or RCA. LV function is moderately reduced with global hypokinesis and    ejection fraction of 32%. HIgher risk abnormal study. Consults    Pulmonology  Cardiology      Physical Exam at Discharge:    /68   Pulse 83   Temp 96.9 °F (36.1 °C) (Oral)   Resp 18   Wt 192 lb 8 oz (87.3 kg)   SpO2 93%   Breastfeeding No   BMI 34.10 kg/m²     Gen: No distress. Alert. Eyes: PERRL. No sclera icterus. No conjunctival injection. ENT: No discharge. Pharynx clear. Neck: Trachea midline. Resp: No accessory muscle use.  No crackles. No wheezes. No rhonchi. CV: Regular rate. Regular rhythm. No murmur. No rub. No edema. Capillary Refill: Brisk,< 3 seconds   Peripheral Pulses: +2 palpable, equal bilaterally   GI: Non-tender. Non-distended. Normal bowel sounds. Skin: Warm and dry. No nodule on exposed extremities. No rash on exposed extremities. M/S: No cyanosis. No joint deformity. No clubbing. Neuro: Awake. Grossly nonfocal    Psych: Oriented x 3. No anxiety or agitation. CBC:   Recent Labs     03/02/22  0509   WBC 8.9   HGB 12.7   HCT 39.3   MCV 83.2        BMP:   Recent Labs     02/28/22  0419 03/01/22  0412 03/02/22  0509    139 143   K 4.4 4.3 3.8    98* 101   CO2 31 24 31   BUN 41* 43* 50*   CREATININE 1.1 1.2 1.5*       CULTURES  Results for Guerda Guerin (MRN [id number]) as of 3/2/2022 07:10   Ref. Range 2/23/2022 14:17 2/23/2022 15:22   INFLUENZA A Latest Ref Range: NOT DETECTED  NOT DETECTED NOT DETECTED   INFLUENZA B Latest Ref Range: NOT DETECTED  NOT DETECTED NOT DETECTED   SARS-CoV-2 RNA, RT PCR Latest Ref Range: NOT DETECTED  DETECTED (A) NOT DETECTED       RADIOLOGY  NM Cardiac Stress Test Nuclear Imaging   Final Result      CT CHEST PULMONARY EMBOLISM W CONTRAST   Final Result   No evidence of pulmonary embolism. COVID-19 pneumonia as above. XR CHEST PORTABLE   Final Result   Findings most consistent with CHF with vascular congestion and interstitial   edema.               Discharge Medications     Medication List      STOP taking these medications    lisinopril 10 MG tablet  Commonly known as: PRINIVIL;ZESTRIL     vitamin D3 10 MCG (400 UNIT) Tabs tablet  Commonly known as: CHOLECALCIFEROL        ASK your doctor about these medications    allopurinol 100 MG tablet  Commonly known as: ZYLOPRIM     amLODIPine 5 MG tablet  Commonly known as: NORVASC     aspirin 81 MG EC tablet     * blood glucose test strips  1 strip by Does not apply route three times daily Patient requesting True Results brand     * Accu-Chek Daphne Plus strip  Generic drug: blood glucose test strips  USE TO TEST BLOOD SUGAR ONCE DAILY     CENTRUM ADULTS PO     furosemide 20 MG tablet  Commonly known as: Lasix  Take 1 tablet by mouth every other day     glucose monitoring kit  1 kit by Does not apply route daily as needed (not prn) Dx E11.9-uses once daily-needs Acucheck Daphne Plus     Handicap Placard [facility]  11/9/21-11/8/26     lisinopril-hydroCHLOROthiazide 10-12.5 MG per tablet  Commonly known as: PRINZIDE;ZESTORETIC     metFORMIN 500 MG tablet  Commonly known as: GLUCOPHAGE  TAKE 1 TABLET EVERY DAY         * This list has 2 medication(s) that are the same as other medications prescribed for you. Read the directions carefully, and ask your doctor or other care provider to review them with you. Discharged in stable condition to [facility] for [facility]    Follow Up: Follow up with physician at [facility]. Cardiology as discussed. Abdirizak Espinosa PA-C  3/2/2022 8:49 AM      ADDENDUM    Due to patient COVID 19 status and need for droplet plus precautions patient needed direct admit to hospitalist service for transfer. It was requested for patient to go to  on telemetry. Plan is still for [facility]. I spoke with hospitalist from [facility] who accepted patient. I spent approximately 45 minutes on this discharge today.      Abdirizak Espinosa PA-C  3/2/2022 10:28 AM

## 2022-03-02 NOTE — CONSULTS
[address] [facility]  [phone]      Attending Physician: Connor Damico MD  Reason for Consultation/Chief Complaint: Shortness of breath    Subjective   History of Present Illness:  Shelli Randall is a 81 y.o. patient who presented to the hospital with complaints of shortness of breath, patient originally presented [facility] was admitted due to concerns for Covid pneumonia, and in the course of work-up, echocardiogram showed LV dysfunction she was also treated for heart failure. She was originally admitted on February 23, 2022. At [facility], troponin level was found to be negative however proBNP level was 13,005. Patient was seen by our consult service at [facility] and was referred to [facility] for possible cardiac catheterization. Patient did develop acute kidney injury, diuretics have been held and fluids have been started. Currently, patient denies chest pain or shortness of breath. She feels better since admission to hospital.    Chronically, she does have diabetes, hypertension hypercholesterolemia, these conditions have been controlled on prescribed medical therapy. Work-up during this hospitalization is also included a stress test which showed moderate size inferolateral and lateral defects and EF of 32%, this was felt to be a higher risk abnormal study. Past Medical History:   has a past medical history of Hypertension, Mixed hyperlipidemia, and Type II or unspecified type diabetes mellitus without mention of complication, not stated as uncontrolled. Surgical History:   has a past surgical history that includes lipoma resection and knee surgery (Right, 11/22/2016). Social History:   reports that she quit smoking about 38 years ago. She has never used smokeless tobacco. She reports that she does not drink alcohol and does not use drugs. Family History:  family history is not on file.       Home Medications:  Were reviewed and are listed in nursing record and/or below  Prior to Admission medications    Medication Sig Start Date End Date Taking? Authorizing Provider   amLODIPine (NORVASC) 5 MG tablet Take 5 mg by mouth daily    Historical Provider, MD   lisinopril-hydroCHLOROthiazide (PRINZIDE;ZESTORETIC) 10-12.5 MG per tablet Take 1 tablet by mouth 2 times daily    Historical Provider, MD   allopurinol (ZYLOPRIM) 100 MG tablet Take 100 mg by mouth daily    Historical Provider, MD   furosemide (LASIX) 20 MG tablet Take 1 tablet by mouth every other day 2/18/22   Jarrod Morgan APRN - CNP   ACCU-CHEK DAPHNE PLUS strip USE TO TEST BLOOD SUGAR ONCE DAILY 1/6/22   POOL Trimble - CNP   Handicap Placard [facility] 11/9/21-11/8/26 11/9/21   Jose M Beltran MD   metFORMIN (GLUCOPHAGE) 500 MG tablet TAKE 1 TABLET EVERY DAY 9/16/21   POOL King - CNP   Multiple Vitamins-Minerals (CENTRUM ADULTS PO) Take by mouth    Historical Provider, MD   Glucose Blood (BLOOD GLUCOSE TEST STRIPS) STRP 1 strip by Does not apply route three times daily Patient requesting True Results brand 5/3/18   Gilbert Santos DO   glucose monitoring kit (FREESTYLE) monitoring kit 1 kit by Does not apply route daily as needed (not prn) Dx E11.9-uses once daily-needs Acucheck Daphne Plus 9/7/16   Gilbert Santos DO   aspirin 81 MG EC tablet Take 81 mg by mouth daily.     Indications: OTC    Historical Provider, MD        CURRENT Medications:  0.9 % sodium chloride infusion, Continuous  0.9 % sodium chloride infusion, PRN  sodium chloride flush 0.9 % injection 5-40 mL, 2 times per day  sodium chloride flush 0.9 % injection 5-40 mL, PRN  0.9 % sodium chloride infusion, PRN  magnesium sulfate 1000 mg in dextrose 5% 100 mL IVPB, PRN  ondansetron (ZOFRAN-ODT) disintegrating tablet 4 mg, Q8H PRN   Or  ondansetron (ZOFRAN) injection 4 mg, Q6H PRN  [START ON 3/3/2022] polyethylene glycol (GLYCOLAX) packet 17 g, Daily  potassium chloride (KLOR-CON M) extended release tablet 40 mEq, PRN   Or  potassium bicarb-citric acid (EFFER-K) effervescent tablet 40 mEq, PRN   Or  potassium chloride 10 mEq/100 mL IVPB (Peripheral Line), PRN  sodium chloride flush 0.9 % injection 5-40 mL, 2 times per day  sodium chloride flush 0.9 % injection 10 mL, PRN  dextrose 5 % solution, PRN  glucagon (rDNA) injection 1 mg, PRN  glucose (GLUTOSE) 40 % oral gel 15 g, PRN  insulin lispro (HUMALOG) injection vial 0-12 Units, Q4H  0.9 % sodium chloride infusion, Continuous  ascorbic acid (VITAMIN C) tablet 500 mg, BID  [START ON 3/3/2022] aspirin EC tablet 81 mg, Daily  bisacodyl (DULCOLAX) suppository 10 mg, Daily PRN  carvedilol (COREG) tablet 6.25 mg, BID WC  dextrose bolus (hypoglycemia) 10% 125 mL, PRN   Or  dextrose bolus (hypoglycemia) 10% 250 mL, PRN  [START ON 3/3/2022] docusate sodium (COLACE) capsule 100 mg, Daily  sacubitril-valsartan (ENTRESTO) 24-26 MG per tablet 1 tablet, BID  [START ON 3/3/2022] spironolactone (ALDACTONE) tablet 25 mg, Lunch  [START ON 3/3/2022] torsemide (DEMADEX) tablet 20 mg, Daily  [START ON 3/3/2022] Vitamin D (CHOLECALCIFEROL) tablet 2,000 Units, Daily  allopurinol (ZYLOPRIM) tablet 100 mg, Daily  glucose (GLUTOSE) 40 % oral gel 15 g, PRN  glucagon (rDNA) injection 1 mg, PRN  dextrose 5 % solution, PRN  sodium chloride flush 0.9 % injection 10 mL, 2 times per day  sodium chloride flush 0.9 % injection 10 mL, PRN  0.9 % sodium chloride infusion, PRN  magnesium sulfate 2000 mg in 50 mL IVPB premix, PRN  senna (SENOKOT) tablet 8.6 mg, Daily PRN  acetaminophen (TYLENOL) tablet 650 mg, Q6H PRN   Or  acetaminophen (TYLENOL) suppository 650 mg, Q6H PRN  albuterol sulfate  (90 Base) MCG/ACT inhaler 2 puff, 4x daily   And  ipratropium (ATROVENT HFA) 17 MCG/ACT inhaler 2 puff, 4x daily  ondansetron (ZOFRAN-ODT) disintegrating tablet 4 mg, Q8H PRN   Or  ondansetron (ZOFRAN) injection 4 mg, Q6H PRN  enoxaparin (LOVENOX) injection 30 mg, Daily  0.9 % sodium chloride infusion, Continuous  sodium chloride flush 0.9 % injection 5-40 mL, 2 times per day  sodium chloride flush 0.9 % injection 5-40 mL, PRN  0.9 % sodium chloride infusion, PRN        Allergies:  Pcn [penicillins], Statins, and Ibuprofen     Review of Systems:   A 14 point review of symptoms completed. Pertinent positives identified in the HPI, all other review of symptoms negative as below.       Objective   PHYSICAL EXAM:    Vitals:    03/02/22 1431   BP: (!) 153/70   Pulse: 84   Resp: 16   Temp: 97.9 °F (36.6 °C)   SpO2: 91%              General Appearance:  Alert, cooperative, no distress, appears stated age   Head:  Normocephalic, without obvious abnormality, atraumatic   Eyes:  PERRL, conjunctiva/corneas clear   Nose: Nares normal, no drainage or sinus tenderness   Throat: Lips, mucosa, and tongue normal   Neck: Supple, symmetrical, trachea midline, no adenopathy, thyroid: not enlarged, symmetric, no tenderness/mass/nodules, no carotid bruit or JVD   Lungs:    Bilateral rhonchi, respirations unlabored   Chest Wall:  No deformity or tenderness   Heart:  Regular rate and rhythm, S1, S2 normal, no murmur, rub or gallop   Abdomen:   Soft, non-tender, bowel sounds active all four quadrants,  no masses, no organomegaly   Extremities: Extremities trace bilateral lower extremity edema   Pulses: 2+ and symmetric   Skin: Skin color, texture, turgor normal, no rashes or lesions   Pysch: Normal mood and affect   Neurologic: Normal gross motor and sensory exam.         Labs   CBC:   Lab Results   Component Value Date    WBC 8.9 03/02/2022    RBC 4.72 03/02/2022    HGB 12.7 03/02/2022    HCT 39.3 03/02/2022    MCV 83.2 03/02/2022    RDW 15.2 03/02/2022     03/02/2022     CMP:  Lab Results   Component Value Date     03/02/2022    K 3.8 03/02/2022    K 4.5 02/23/2022     03/02/2022    CO2 31 03/02/2022    BUN 50 03/02/2022    CREATININE 1.5 03/02/2022    GFRAA 40 03/02/2022    GFRAA >60 05/08/2013    AGRATIO 1.4 02/23/2022    LABGLOM 33 03/02/2022    GLUCOSE 151 03/02/2022    PROT 7.7 02/23/2022    PROT 7.2 05/03/2012    CALCIUM 10.1 03/02/2022    BILITOT 0.3 02/23/2022    ALKPHOS 99 02/23/2022    AST 29 02/23/2022    ALT 22 02/23/2022     PT/INR:  No results found for: PTINR  HgBA1c:  Lab Results   Component Value Date    LABA1C 6.4 02/23/2022     Lab Results   Component Value Date    CKTOTAL 34 10/17/2013    TROPONINI <0.01 02/23/2022         Cardiac Data     Last EKG:   Normal sinus rhythm, left axis, poor R wave progression, PVC, baseline artifact versus nonspecific ST/T changes. Echo:       Summary   LV systolic function is reduced with ejection fraction estimated at 35%. There is mild global hypokinesis. There is mild concentric left ventricular hypertrophy. Elevated left ventricular diastolic filling pressure. The right atrium is mildly dilated. Mild posterior mitral annular calcification is present. Aortic valve appears sclerotic but opens adequately. Moderate mitral regurgitation. Mild aortic, tricuspid, and pulmonic regurgitation is present. Systolic pulmonary artery pressure (SPAP) estimated at 76 mmHg (RA pressure   8 mmHg), consistent with severe pulmonary hypertension. There is a trivial circumferential pericardial effusion noted. 11/21/2016 EF greater than 70%Dynamic outflow tract obstruction, LVH, DD1,   posterior MAC    Stress Test:    March 1, 2022:        Summary    Moderate sized inferolateral and lateral partial reversibility defects    consistent with ischemia and infarction in the territory of the mid LCx    and/or RCA. LV function is moderately reduced with global hypokinesis and    ejection fraction of 32%. HIgher risk abnormal study. Cath:    Studies:     Ct chest        Impression   No evidence of pulmonary embolism.       COVID-19 pneumonia as above. I have reviewed labs and imaging/xray/diagnostic testing in this note.     Assessment and Plan Patient Active Problem List   Diagnosis    Osteoarthritis    Mixed hyperlipidemia    Essential hypertension    Diabetes mellitus ([facility]    Vitamin D deficiency    Stress incontinence    Hypercalcemia    Shoulder impingement    Bilateral adhesive capsulitis of shoulders    Ruptured Bakers cyst    Tear of medial meniscus of right knee, current    Hyperuricemia    [facility]    VLTDO-71    Acute systolic CHF (congestive heart failure) ([facility]    Primary hypertension    Abnormal stress test    Pulmonary HTN ([facility]    Ischemic cardiomyopathy    JANAE (acute kidney injury) ([facility]       Acute systolic heart failure, LV dysfunction, new diagnosis, plan on right and left heart catheterization once patient is optimized from acute kidney injury standpoint. Patient had been on Entresto, will hold that, will continue with beta-blocker. Acute kidney injury, will involve nephrology for consultation with plan for heart catheterization, will hold diuretics and continue IV fluids at this time. Although, will continue with spironolactone. Hypertension, beta-blocker as above    Diabetes, as per primary service    Covid pneumonia, as per primary service    Case/findings/plans discussed the patient and family, they understand/agree, they understand that cardiac catheterization will be placed on hold, will reconsider for Friday, March 4, 2022 for right and left heart catheterization. DISCUSSION OF CARDIAC CATHETERIZATION PROCEDURES: The procedures, indications, risks and alternatives have been discussed with the patient and, as appropriate, with the patient's guardian . Risks discussed included, but are not limited to, bleeding, development of blood clots/emboli, damage to blood vessels, renal failure, malignant cardiac arrhythmias, stroke, heart attack, emergent coronary bypass surgery, death, dye allergy.   The patient (and guardian as appropriate) expressed understanding of the aforementioned and wished to proceed. Thank you for allowing us to participate in the care of Jason Mims. Please call me with any questions [phone].     Nessa Coffman MD, [facility]   Interventional Cardiologist  [facility]  [phone] [facility]  [phone] [address]  3/2/2022 3:49 PM

## 2022-03-02 NOTE — PROGRESS NOTES
Report given to Jamarcus Castillo RN. Jamarcus Castillo aware that daughter Lynne Brannon would like to be called with transfer time, and Krishna English would like to be called if she doesn't answer. Care transferred at this time.

## 2022-03-02 NOTE — PROGRESS NOTES
DVT Prophy dose appropriate.   Platelets 678  Renal function < 30  Change Lovenox to 30mg daily  Christoph St Pharm D 3/2/956187:36 AM  .

## 2022-03-02 NOTE — PROGRESS NOTES
Beni Martinez and Ngozi Yanez updated on care plan. Goal for hearth cath later this afternoon. Called and spoke to Iza Weaver RN from [facility]. Updated on care plan and nursing interventions. Addressed all Pt and Nursing concerns.      Kaur Mathis RN

## 2022-03-03 LAB
A/G RATIO: 1.7 (ref 1.1–2.2)
ALBUMIN SERPL-MCNC: 3.4 G/DL (ref 3.4–5)
ALP BLD-CCNC: 81 U/L (ref 40–129)
ALT SERPL-CCNC: 33 U/L (ref 10–40)
ANION GAP SERPL CALCULATED.3IONS-SCNC: 11 MMOL/L (ref 3–16)
AST SERPL-CCNC: 19 U/L (ref 15–37)
BASOPHILS ABSOLUTE: 0.1 K/UL (ref 0–0.2)
BASOPHILS RELATIVE PERCENT: 0.9 %
BILIRUB SERPL-MCNC: 0.6 MG/DL (ref 0–1)
BUN BLDV-MCNC: 48 MG/DL (ref 7–20)
CALCIUM SERPL-MCNC: 10.2 MG/DL (ref 8.3–10.6)
CHLORIDE BLD-SCNC: 106 MMOL/L (ref 99–110)
CO2: 27 MMOL/L (ref 21–32)
CREAT SERPL-MCNC: 1.2 MG/DL (ref 0.6–1.2)
CREATININE URINE: 110.9 MG/DL (ref 28–259)
EKG ATRIAL RATE: 85 BPM
EKG DIAGNOSIS: NORMAL
EKG P AXIS: 69 DEGREES
EKG P-R INTERVAL: 160 MS
EKG Q-T INTERVAL: 378 MS
EKG QRS DURATION: 78 MS
EKG QTC CALCULATION (BAZETT): 449 MS
EKG R AXIS: 117 DEGREES
EKG T AXIS: 72 DEGREES
EKG VENTRICULAR RATE: 85 BPM
EOSINOPHILS ABSOLUTE: 0.4 K/UL (ref 0–0.6)
EOSINOPHILS RELATIVE PERCENT: 5.1 %
GFR AFRICAN AMERICAN: 52
GFR NON-AFRICAN AMERICAN: 43
GLUCOSE BLD-MCNC: 113 MG/DL (ref 70–99)
GLUCOSE BLD-MCNC: 117 MG/DL (ref 70–99)
GLUCOSE BLD-MCNC: 120 MG/DL (ref 70–99)
GLUCOSE BLD-MCNC: 121 MG/DL (ref 70–99)
GLUCOSE BLD-MCNC: 130 MG/DL (ref 70–99)
GLUCOSE BLD-MCNC: 275 MG/DL (ref 70–99)
GLUCOSE BLD-MCNC: 82 MG/DL (ref 70–99)
HCT VFR BLD CALC: 38.4 % (ref 36–48)
HEMOGLOBIN: 12.4 G/DL (ref 12–16)
LYMPHOCYTES ABSOLUTE: 1.3 K/UL (ref 1–5.1)
LYMPHOCYTES RELATIVE PERCENT: 18.7 %
MCH RBC QN AUTO: 26.9 PG (ref 26–34)
MCHC RBC AUTO-ENTMCNC: 32.4 G/DL (ref 31–36)
MCV RBC AUTO: 83 FL (ref 80–100)
MONOCYTES ABSOLUTE: 0.8 K/UL (ref 0–1.3)
MONOCYTES RELATIVE PERCENT: 10.8 %
NEUTROPHILS ABSOLUTE: 4.6 K/UL (ref 1.7–7.7)
NEUTROPHILS RELATIVE PERCENT: 64.5 %
PDW BLD-RTO: 15.3 % (ref 12.4–15.4)
PERFORMED ON: ABNORMAL
PERFORMED ON: NORMAL
PLATELET # BLD: 252 K/UL (ref 135–450)
PMV BLD AUTO: 9.2 FL (ref 5–10.5)
POTASSIUM REFLEX MAGNESIUM: 4.1 MMOL/L (ref 3.5–5.1)
RBC # BLD: 4.62 M/UL (ref 4–5.2)
SODIUM BLD-SCNC: 144 MMOL/L (ref 136–145)
SODIUM URINE: 58 MMOL/L
TOTAL PROTEIN: 5.4 G/DL (ref 6.4–8.2)
URIC ACID, SERUM: 8.7 MG/DL (ref 2.6–6)
WBC # BLD: 7.1 K/UL (ref 4–11)

## 2022-03-03 PROCEDURE — 82570 ASSAY OF URINE CREATININE: CPT

## 2022-03-03 PROCEDURE — 97530 THERAPEUTIC ACTIVITIES: CPT

## 2022-03-03 PROCEDURE — 99223 1ST HOSP IP/OBS HIGH 75: CPT | Performed by: HOSPITALIST

## 2022-03-03 PROCEDURE — 36415 COLL VENOUS BLD VENIPUNCTURE: CPT

## 2022-03-03 PROCEDURE — 97162 PT EVAL MOD COMPLEX 30 MIN: CPT

## 2022-03-03 PROCEDURE — 93010 ELECTROCARDIOGRAM REPORT: CPT | Performed by: INTERNAL MEDICINE

## 2022-03-03 PROCEDURE — 80053 COMPREHEN METABOLIC PANEL: CPT

## 2022-03-03 PROCEDURE — 2580000003 HC RX 258: Performed by: NURSE PRACTITIONER

## 2022-03-03 PROCEDURE — 84550 ASSAY OF BLOOD/URIC ACID: CPT

## 2022-03-03 PROCEDURE — 6370000000 HC RX 637 (ALT 250 FOR IP): Performed by: INTERNAL MEDICINE

## 2022-03-03 PROCEDURE — 97166 OT EVAL MOD COMPLEX 45 MIN: CPT

## 2022-03-03 PROCEDURE — 84300 ASSAY OF URINE SODIUM: CPT

## 2022-03-03 PROCEDURE — 85025 COMPLETE CBC W/AUTO DIFF WBC: CPT

## 2022-03-03 PROCEDURE — 1200000000 HC SEMI PRIVATE

## 2022-03-03 PROCEDURE — 99233 SBSQ HOSP IP/OBS HIGH 50: CPT | Performed by: NURSE PRACTITIONER

## 2022-03-03 RX ORDER — SODIUM CHLORIDE 0.9 % (FLUSH) 0.9 %
5-40 SYRINGE (ML) INJECTION PRN
Status: DISCONTINUED | OUTPATIENT
Start: 2022-03-03 | End: 2022-03-07 | Stop reason: SDUPTHER

## 2022-03-03 RX ORDER — SODIUM CHLORIDE 9 MG/ML
INJECTION, SOLUTION INTRAVENOUS CONTINUOUS
Status: DISCONTINUED | OUTPATIENT
Start: 2022-03-04 | End: 2022-03-04

## 2022-03-03 RX ORDER — SODIUM CHLORIDE 0.9 % (FLUSH) 0.9 %
5-40 SYRINGE (ML) INJECTION EVERY 12 HOURS SCHEDULED
Status: DISCONTINUED | OUTPATIENT
Start: 2022-03-03 | End: 2022-03-05

## 2022-03-03 RX ORDER — SODIUM CHLORIDE 9 MG/ML
25 INJECTION, SOLUTION INTRAVENOUS PRN
Status: DISCONTINUED | OUTPATIENT
Start: 2022-03-03 | End: 2022-03-07 | Stop reason: SDUPTHER

## 2022-03-03 RX ADMIN — OXYCODONE HYDROCHLORIDE AND ACETAMINOPHEN 500 MG: 500 TABLET ORAL at 08:21

## 2022-03-03 RX ADMIN — INSULIN LISPRO 6 UNITS: 100 INJECTION, SOLUTION INTRAVENOUS; SUBCUTANEOUS at 12:20

## 2022-03-03 RX ADMIN — SODIUM CHLORIDE, PRESERVATIVE FREE 10 ML: 5 INJECTION INTRAVENOUS at 20:43

## 2022-03-03 RX ADMIN — DOCUSATE SODIUM 100 MG: 100 CAPSULE, LIQUID FILLED ORAL at 08:21

## 2022-03-03 RX ADMIN — Medication 2000 UNITS: at 08:19

## 2022-03-03 RX ADMIN — POLYETHYLENE GLYCOL 3350 17 G: 17 POWDER, FOR SOLUTION ORAL at 08:21

## 2022-03-03 RX ADMIN — OXYCODONE HYDROCHLORIDE AND ACETAMINOPHEN 500 MG: 500 TABLET ORAL at 20:39

## 2022-03-03 ASSESSMENT — PAIN SCALES - GENERAL
PAINLEVEL_OUTOF10: 0
PAINLEVEL_OUTOF10: 0

## 2022-03-03 NOTE — CONSULTS
Nephrology Consult Note                                                                                                                                                                                                                                                                                                                                                               Office : [phone]     Fax :[phone]              Patient's Name: Sasha Cueva  11:37 AM  3/3/2022    Reason for Consult: JANAE      Requesting Physician:  Merna Bumpers, DO      Chief Complaint:  CHF    History of Present Ilness:    Sasha Cueva is a 81 y.o. female with PMH of HTN , CMP , DM , HTN    Transferred from [facility] for cardiology eval for ischemic cardiomyopathy    Stress test was performed and noted to be abnormal therefore it was recommended that she be transferred to this facility for further intervention. Nephrology consult for JANAE management before cardiac cath. Patient got IVF NS overnight ,serum cr trending down and close to baseline now    Denies chest pain or diarrhea or vomiting   Not in acute distress       Past Medical History:   Diagnosis Date    Hypertension     Mixed hyperlipidemia     Hyperlipidemia    Type II or unspecified type diabetes mellitus without mention of complication, not stated as uncontrolled        Past Surgical History:   Procedure Laterality Date    KNEE SURGERY Right 11/22/2016    LIPOMA RESECTION         History reviewed. No pertinent family history. reports that she quit smoking about 38 years ago. She has never used smokeless tobacco. She reports that she does not drink alcohol and does not use drugs.     Allergies:  Pcn [penicillins], Statins, and Ibuprofen    Current Medications:    0.9 % sodium chloride infusion, PRN  sodium chloride flush 0.9 % injection 5-40 mL, 2 times per day  sodium chloride flush 0.9 % injection 5-40 mL, PRN  0.9 % sodium chloride infusion, PRN  magnesium sulfate 1000 mg in dextrose 5% 100 mL IVPB, PRN  ondansetron (ZOFRAN-ODT) disintegrating tablet 4 mg, Q8H PRN   Or  ondansetron (ZOFRAN) injection 4 mg, Q6H PRN  polyethylene glycol (GLYCOLAX) packet 17 g, Daily  potassium chloride (KLOR-CON M) extended release tablet 40 mEq, PRN   Or  potassium bicarb-citric acid (EFFER-K) effervescent tablet 40 mEq, PRN   Or  potassium chloride 10 mEq/100 mL IVPB (Peripheral Line), PRN  sodium chloride flush 0.9 % injection 5-40 mL, 2 times per day  sodium chloride flush 0.9 % injection 10 mL, PRN  insulin lispro (HUMALOG) injection vial 0-12 Units, Q4H  ascorbic acid (VITAMIN C) tablet 500 mg, BID  aspirin EC tablet 81 mg, Daily  bisacodyl (DULCOLAX) suppository 10 mg, Daily PRN  carvedilol (COREG) tablet 6.25 mg, BID WC  dextrose bolus (hypoglycemia) 10% 125 mL, PRN   Or  dextrose bolus (hypoglycemia) 10% 250 mL, PRN  docusate sodium (COLACE) capsule 100 mg, Daily  [Held by provider] spironolactone (ALDACTONE) tablet 25 mg, Lunch  Vitamin D (CHOLECALCIFEROL) tablet 2,000 Units, Daily  allopurinol (ZYLOPRIM) tablet 100 mg, Daily  glucose (GLUTOSE) 40 % oral gel 15 g, PRN  glucagon (rDNA) injection 1 mg, PRN  dextrose 5 % solution, PRN  sodium chloride flush 0.9 % injection 10 mL, 2 times per day  sodium chloride flush 0.9 % injection 10 mL, PRN  0.9 % sodium chloride infusion, PRN  magnesium sulfate 2000 mg in 50 mL IVPB premix, PRN  senna (SENOKOT) tablet 8.6 mg, Daily PRN  acetaminophen (TYLENOL) tablet 650 mg, Q6H PRN   Or  acetaminophen (TYLENOL) suppository 650 mg, Q6H PRN  ondansetron (ZOFRAN-ODT) disintegrating tablet 4 mg, Q8H PRN   Or  ondansetron (ZOFRAN) injection 4 mg, Q6H PRN  enoxaparin (LOVENOX) injection 30 mg, Daily  sodium chloride flush 0.9 % injection 5-40 mL, 2 times per day  sodium chloride flush 0.9 % injection 5-40 mL, PRN  0.9 % sodium chloride infusion, PRN  albuterol sulfate  (90 Base) MCG/ACT inhaler 2 puff, Q4H PRN   And  ipratropium (ATROVENT HFA) 17 MCG/ACT inhaler 2 puff, Q4H PRN        Review of Systems:   14 point ROS obtained but were negative except mentioned in HPI      Physical exam:     Vitals:  BP 89/62   Pulse 85   Temp 97.8 °F (36.6 °C) (Oral)   Resp 18   SpO2 92%   Constitutional:  OAA X3 NAD  Skin: no rash, turgor wnl  Heent:  eomi, mmm  Neck: no bruits or jvd noted  Cardiovascular:  S1, S2 without m/r/g  Respiratory: CTA B without w/r/r  Abdomen:  +bs, soft, nt, nd  Ext: no  lower extremity edema  Psychiatric: mood and affect appropriate  Musculoskeletal:  Rom, muscular strength intact    Data:   Labs:  CBC:   Recent Labs     03/02/22  0509 03/03/22  0534   WBC 8.9 7.1   HGB 12.7 12.4    252     BMP:    Recent Labs     03/01/22  0412 03/02/22  0509 03/03/22  0534    143 144   K 4.3 3.8 4.1   CL 98* 101 106   CO2 24 31 27   BUN 43* 50* 48*   CREATININE 1.2 1.5* 1.2   GLUCOSE 159* 151* 121*     Ca/Mg/Phos:   Recent Labs     03/01/22  0412 03/02/22  0509 03/03/22  0534   CALCIUM 10.3 10.1 10.2     Hepatic:   Recent Labs     03/03/22  0534   AST 19   ALT 33   BILITOT 0.6   ALKPHOS 81     Troponin: No results for input(s): TROPONINI in the last 72 hours. BNP: No results for input(s): BNP in the last 72 hours. Lipids: No results for input(s): CHOL, TRIG, HDL, LDLCALC, LABVLDL in the last 72 hours. ABGs: No results for input(s): PHART, PO2ART, FIH8TNQ in the last 72 hours. INR: No results for input(s): INR in the last 72 hours. UA:No results for input(s): Hesham Jessica, GLUCOSEU, BILIRUBINUR, Amy Isidro, BLOODU, PHUR, PROTEINU, UROBILINOGEN, NITRU, LEUKOCYTESUR, LABMICR, URINETYPE in the last 72 hours. Urine Microscopic: No results for input(s): LABCAST, BACTERIA, COMU, HYALCAST, WBCUA, RBCUA, EPIU in the last 72 hours. Urine Culture: No results for input(s): LABURIN in the last 72 hours.   Urine Chemistry: No results for input(s): Keila [place], Paul Wilks in the last 72 hours.          IMAGING:  No orders to display       Assessment/Plan     1.   Acute renal failure       Baseline serum cr ~ 1      2 CHF , acute       3 COIVD 19 infection     4 Ischemic cardiomyopathy    5 DLP    6 HTN    Plan    JANAE improving   Serum cr trend down to 1.2    Would stop IVF NS for now     Give  IVF NS  6 hrs before and after  cardiac cath as DIANN ppx    Use least possible amount of contrast     Hold diuretics     Hold ACEI or ARB    Avoid NSAIDs    Keep SBP > 120 mmhg                Thank you for allowing us to participate in care of Humberto Conteh MD  Feel free to contact me   [facility] [address]  Office : [phone]  Fax :[phone]

## 2022-03-03 NOTE — PROGRESS NOTES
Cath lab said procedure was cancelled for today. Diet order placed. Per cath lab will now go Friday at 1100.

## 2022-03-03 NOTE — PROGRESS NOTES
[facility]   Daily Progress Note    Admit Date:  3/2/2022  HPI:  No chief complaint on file. Interval history: Juanito Toth is being followed for shortness of breath. Presented to [facility] with dyspnea, work up showed LVEF drop, also treated for COVID pneumonia. Subjective:  Ms. Raiza Pickering is [place]. No chest pain. Remains off of oxygen. No shortness of breath.      Objective:   BP 89/62   Pulse 85   Temp 97.8 °F (36.6 °C) (Oral)   Resp 18   SpO2 92%     Intake/Output Summary (Last 24 hours) at 3/3/2022 0858  Last data filed at 3/3/2022 0525  Gross per 24 hour   Intake 854 ml   Output 300 ml   Net 554 ml       NYHA: IV    Physical Exam:  General:  Awake, alert, NAD, [place]  Skin:  Warm and dry  Neck:  JVD difficult to assess due to body habitus   Chest:  Dim to auscultation, no wheezes/rhonchi/rales  Telemetry: NSR   Cardiovascular:  RRR S1S2, no m/r/g   Abdomen:  Soft, nontender, +bowel sounds  Extremities:  no bilateral lower extremity edema    Medications:    sodium chloride flush  5-40 mL IntraVENous 2 times per day    polyethylene glycol  17 g Oral Daily    sodium chloride flush  5-40 mL IntraVENous 2 times per day    insulin lispro  0-12 Units SubCUTAneous Q4H    ascorbic acid  500 mg Oral BID    aspirin  81 mg Oral Daily    carvedilol  6.25 mg Oral BID WC    docusate sodium  100 mg Oral Daily    [Held by provider] spironolactone  25 mg Oral Lunch    Vitamin D  2,000 Units Oral Daily    allopurinol  100 mg Oral Daily    sodium chloride flush  10 mL IntraVENous 2 times per day    enoxaparin  30 mg SubCUTAneous Daily    sodium chloride flush  5-40 mL IntraVENous 2 times per day      sodium chloride 75 mL/hr at 03/02/22 1511    sodium chloride      sodium chloride      sodium chloride Stopped (03/02/22 1510)    dextrose      sodium chloride      sodium chloride      sodium chloride         Lab Data:  CBC:   Recent Labs     03/02/22  0509 03/03/22  0534   WBC 8.9 7.1 Severe pulmonary HTn  HTN  HLD- statin allergy  DM    Plan:  Continue to hold diuretics and entresto  IVFs per nephrology; discussed with nephrology. NPO after midnight tonight for cardiac cath scheduled at 11 am tomorrow   Continue aspirin and coreg       Education provided today Disease process and medications discussed. Questions answered fully.   Total Time spent educating today 10 minutes     POOL Howell - CNP,  3/3/2022, 12:35 PM

## 2022-03-03 NOTE — PROGRESS NOTES
Physical Therapy    Facility/Department: [facility] C3 TELE/MED SURG/ONC  Initial Assessment    NAME: Khadijah Hanley  : 1935  MRN: [id number]    Date of Service: 3/3/2022    Discharge Recommendations:  Subacute/Skilled Nursing Facility   PT Equipment Recommendations  Equipment Needed: No  Other: TBD at SNF    Assessment   Body structures, Functions, Activity limitations: Decreased functional mobility ; Decreased strength;Decreased safe awareness;Decreased endurance;Decreased cognition;Decreased balance;Decreased posture  Assessment: Pt referred for PT evaluation during current hospital stay with dx of acute systolic CHF. Pt recently diagnosed with COVID PNA, although currently on room air and out of isolation precautions as of today. Pt functioning below her baseline, requiring modA x 1 for bed mobility and assist x 2 for portions of transfers with support of RW. Pt too weak/unsteady to safely ambulate with PT today. Recommend SNF at D/C in light of current deficits. Treatment Diagnosis: Decreased (I) with mobility  Specific instructions for Next Treatment: Progress ther ex and mobility as tolerated  Prognosis: Good  Decision Making: Medium Complexity  PT Education: Goals; General Safety;PT Role;Plan of Care;Disease Specific Education; Functional Mobility Training;Orientation;Equipment;Precautions;Transfer Training;Energy Conservation  Patient Education: Disease-specific education: Pt educated on role of PT in hospital, benefits of regular OOB mobility, general safety during hospital stay - pt verbalizes understanding, will likely require some reinforcement. REQUIRES PT FOLLOW UP: Yes  Activity Tolerance: Patient Tolerated treatment well  Activity Tolerance: Vitals during session on room air: BP = 148/95, HR = 96 bpm, O2 sat = 95%. Patient Diagnosis(es): There were no encounter diagnoses.      has a past medical history of Hypertension, Mixed hyperlipidemia, and Type II or unspecified type diabetes mellitus without mention of complication, not stated as uncontrolled. has a past surgical history that includes lipoma resection and knee surgery (Right, 11/22/2016). Restrictions  Restrictions/Precautions  Restrictions/Precautions: General Precautions,Fall Risk  Position Activity Restriction  Other position/activity restrictions: High fall risk per nursing assessment, PureWick external female urinary catheter, telemetry  Vision/Hearing  Vision: Impaired  Vision Exceptions: Wears glasses at all times  Hearing: Exceptions to [facility]  Hearing Exceptions: Hard of hearing/hearing concerns     Subjective  General  Chart Reviewed: Yes  Patient assessed for rehabilitation services?: Yes  Additional Pertinent Hx: Recently COVID+ although out of isolation as of 3/03/22  Family / Caregiver Present: No  Referring Practitioner: Andree Wells MD  Referral Date : 03/02/22  Diagnosis: Acute systolic CHF, COVID PNA (pt now out of isolation precautions)  Follows Commands: Within Functional Limits  General Comment  Comments: Pt resting in bed upon entry of therapy staff  Subjective  Subjective: Pt agreeable to work with PT this afternoon and transfer up to chair for dinner.   Pain Screening  Patient Currently in Pain: Denies  Intervention List: Patient able to continue with treatment    Orientation  Orientation  Overall Orientation Status: Impaired  Orientation Level: Oriented to person;Oriented to place;Oriented to time;Disoriented to situation (pt oriented to place (\"hospital\") when given increased time to respond, thought she was in the hospital \"to get worked on\" -- unsure of specifics)     Social/Functional History  Social/Functional History  Lives With: Alone  Type of Home: House  Home Layout: One level  Home Access: Level entry (2 steps in the living room (however plans on staying on main level and not using the living room))  Bathroom Shower/Tub: Tub/Shower unit  Bathroom Toilet: Handicap height  Home Equipment: Rolling walker,Crutches,Cane  Receives Help From: Family (daughter lives near by and can assist if need, but haven't needed yet.)  ADL Assistance: Independent  Homemaking Assistance: Independent  Homemaking Responsibilities: Yes  Meal Prep Responsibility: Primary  Laundry Responsibility: Primary  Cleaning Responsibility: Primary  Shopping Responsibility: No (daughter completes)  Ambulation Assistance: Independent  Transfer Assistance: Independent  Active : Yes  Occupation: Retired  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)    Objective  Observation/Palpation  Posture: Fair    AROM RLE (degrees)  RLE AROM: WFL  AROM LLE (degrees)  LLE AROM : WFL  Strength RLE  Comment: Grossly 4-/5 throughout  Strength LLE  Comment: Grossly 4-/5 throughout     Bed mobility  Supine to Sit: Moderate assistance  Scooting: Moderate assistance (to scoot forward to EOB)     Transfers  Sit to Stand: 2 Person Assistance (min-modA x 2 from EOB to RW)  Stand to sit: Moderate Assistance  Bed to Chair: 2 Person Assistance (Yue x 2 using RW, moving toward L side, mildly unsteady)     Ambulation  Ambulation?: No (unsafe to attempt given pt's unsteadiness)     Balance  Posture: Fair  Sitting - Static: Good;-  Sitting - Dynamic: Fair  Standing - Static: Fair;- (using RW)  Standing - Dynamic: Fair;- (using RW)    Plan   Times per week: 3-5x/week in acute care  Times per day: Daily  Specific instructions for Next Treatment: Progress ther ex and mobility as tolerated  Current Treatment Recommendations: Strengthening,Balance Training,Endurance Training,Functional Mobility Training,Transfer Training,Gait Training,Safety Education & Training,Equipment Evaluation, Education, & procurement  Safety Devices:  All fall risk precautions in place,Left in chair,Call light within reach,Chair alarm in place,Nurse notified,Gait belt,Patient at risk for falls    AM-PAC Score  AM-PAC Inpatient Mobility without Stair Climbing Raw Score : 9 (03/03/22 1740)  AM-PAC Inpatient without Stair Climbing T-Scale Score : 32.44 (03/03/22 1740)  Mobility Inpatient CMS 0-100% Score: 76.07 (03/03/22 1740)  Mobility Inpatient without Stair CMS G-Code Modifier : CL (03/03/22 1740)    Goals  Short term goals  Time Frame for Short term goals: 1 week, 3/10/22 (unless otherwise specified)  Short term goal 1: Pt will transfer supine <-> sit with CGA x 1  Short term goal 2: Pt will transfer sit <-> stand and bed>chair using RW with Yue x 1  Short term goal 3: Pt will ambulate x 40 feet using RW with Yue x 1  Short term goal 4: By 3/06/22: Pt will tolerate 12-15 reps BLE exercise for strengthening, balance, and endurance  Patient Goals   Patient goals : \"To get stronger\"       Therapy Time   Individual Concurrent Group Co-treatment   Time In 1630         Time Out 1703         Minutes 33         Timed Code Treatment Minutes: Sean Taylor [address] #[id number]    If pt is unable to be seen after this session, please let this note serve as discharge summary. Please see case management note for discharge disposition. Thank you.

## 2022-03-03 NOTE — PROGRESS NOTES
Pt assessment completed and charted. VSS. Pt a/o x4. Medications given per MAR. Purewick in place. Pt only urinated 100mL so far. Explained to pt if she doesn't urinate more in AM that we will have to bladder scan. Bed in lowest position and wheels locked. Call light within reach. Bedside table within reach. Non-skid footwear in place. Pt denies any other needs at this time. Pt calls out appropriately. Will continue to monitor.

## 2022-03-03 NOTE — PROGRESS NOTES
Shift assessment completed. Pt A&O x4, VSS. Non skid socks on. Purewick in place, urine yellow and clear. Denies any needs at this time. Pt NPO at midnight for cath tomorrow. Bed alarm active for safety. Bed locked and in lowest position. Call light and bedside table within reach. Will continue to monitor.

## 2022-03-03 NOTE — CARE COORDINATION
CASE MANAGEMENT INITIAL ASSESSMENT      Reviewed chart and completed assessment with patient:yes  Explained Case Management role/services. yes    Primary contact information:daughter [address] :           Can this person be reached and be able to respond quickly, such as within a few minutes or hours? Yes      Admit date/status:3/3/22  Diagnosis:COVID+ , cardiomyopathy  Is this a Readmission?:  No, transfer from [address] [place] [address] required for SNF: Yes       3 night stay required: No    Living arrangements, Adls, care needs, prior to admission:from  Home alone, usually independent in ADLs    Transportation:car     Durable Medical Equipment at home:  Walker__Cane__RTS__ BSC__Shower Chair__  02__ HHN__ CPAP__  BiPap__  Hospital Bed__ W/C___ Other__________    Services in the home and/or outpatient, prior to [address] (if applicable)   · Name:  · Address:  · Dialysis Schedule:  · Phone:  · Fax:    PT/OT recs:SNF    Hospital Exemption Notification (HEN):    Barriers to discharge:medical complications and COVID+    Plan/comments:Referred to patient for d/c planning. Spoke to patient. Call placed to daughterJb and message left. Patient is an 81year old female admitted for cardiomyopathy. Patient usually lives at home alone. Patient is usually independent in ADLs. Per patient plan is for SNF. Per medical record, plan is for [facility] KALIE ROONEY 10 days post COVID+ dx. Call placed to [facility] to verify 10 day date and if bed available. Spoke to patient who requested to verify plan with amanda Lofton. Call placed and message left. Await response. Electronically signed by JANELLE Brannon LISW-S on 3/3/2022 at 10:32 AM     UPDATE: Received return call from [facility] Fantasy BuzzerGWEN ROONEY will be able to accept patient 3/6/22 Baljit.     Electronically signed by JANELLE Brannon LISW-S on 3/3/2022 at 3:03 PM     ECOC on chart for MD signature

## 2022-03-03 NOTE — PROGRESS NOTES
Occupational Therapy   Occupational Therapy Initial Assessment/Treatment  Date: 3/3/2022   Patient Name: Shelli Randall  MRN: [id number]     : 1935    Date of Service: 3/3/2022    Discharge Recommendations:  Subacute/Skilled Nursing Facility       Assessment   Performance deficits / Impairments: Decreased functional mobility ; Decreased safe awareness;Decreased balance;Decreased ADL status; Decreased endurance;Decreased strength  [facility], [facility] Today patient min-modA of 2 for transfers, maxA for LE dressing, unsteadiness noted with transfers and posterior clean seated EOB. After evaluation, pt found to be presenting with the above mentioned occupational performance deficits which are affecting participation in daily living skills. Pt would benefit from continued skilled occupational therapy to address ADLs, functional mobility, and safety while in acute care. Prognosis: Good  Decision Making: Medium Complexity  OT Education: OT Role;Transfer Training;Plan of Care;ADL Adaptive Strategies; Equipment  Patient Education: Disease specific: Pt educated on benefits of OOB activity to decrease risks associated with prolonged bedrest while in hospital. Pt verb understanding. REQUIRES OT FOLLOW UP: Yes  Activity Tolerance  Activity Tolerance: Patient Tolerated treatment well  Safety Devices  Safety Devices in place: Yes  Type of devices: Gait belt;Call light within reach; Left in chair;Chair alarm in place;Nurse notified           Patient Diagnosis(es): There were no encounter diagnoses. has a past medical history of Hypertension, Mixed hyperlipidemia, and Type II or unspecified type diabetes mellitus without mention of complication, not stated as uncontrolled. has a past surgical history that includes lipoma resection and knee surgery (Right, 2016).            Restrictions  Restrictions/Precautions  Restrictions/Precautions: General Precautions,Fall Risk  Position Activity Restriction  Other position/activity restrictions: High fall risk per nursing assessment, PureWick external female urinary catheter, telemetry    Subjective   General  Chart Reviewed: Robson Khan and Physical  Patient assessed for rehabilitation services?: Yes  Family / Caregiver Present: No  Referring Practitioner: En Rodriguez MD 3/02/22  Diagnosis: Acute systolic CHF  Patient Currently in Pain: Denies  Vital Signs  Pulse: 96  Heart Rate Source: Monitor  BP: (!) 148/95  BP Location: Left lower arm  Patient Position: Sitting;Up in chair  Patient Currently in Pain: Denies  Oxygen Therapy  SpO2: 95 %  Pulse Oximeter Device Mode: Intermittent  Pulse Oximeter Device Location: Finger;Left  O2 Device: None (Room air)  Social/Functional History  Social/Functional History  Lives With: Alone  Type of Home: House  Home Layout: One level  Home Access: Level entry (2 steps in the living room (however plans on staying on main level and not using the living room))  Bathroom Shower/Tub: Tub/Shower unit  Bathroom Toilet: Handicap height  Home Equipment: Rolling walker,Crutches,Cane  Receives Help From: Family (daughter lives near by and can assist if need, but haven't needed yet.)  ADL Assistance: Independent  Homemaking Assistance: Independent  Homemaking Responsibilities: Yes  Meal Prep Responsibility: Primary  Laundry Responsibility: Primary  Cleaning Responsibility: Primary  Shopping Responsibility: No (daughter completes)  Ambulation Assistance: Independent  Transfer Assistance: Independent  Active : Yes  Occupation: Retired  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)       Objective   Vision: Impaired  Vision Exceptions: Wears glasses at all times  Hearing: Exceptions to [facility]  Hearing Exceptions: Hard of hearing/hearing concerns    Orientation  Overall Orientation Status: Impaired  Orientation Level: Oriented to place;Oriented to time;Oriented to person;Disoriented to place; Disoriented to situation  Observation/Palpation  Posture: Fair     Balance  Sitting Balance: Stand by assistance-modA d/t posterior lean  Standing Balance: Minimal assistance (of 2 with RW)    ADL  Feeding: Setup  Grooming: Setup  UE Dressing: Minimal assistance  LE Dressing: Maximum assistance     Tone RUE  RUE Tone: Normotonic  Tone LUE  LUE Tone: Normotonic  Coordination  Movements Are Fluid And Coordinated: Yes     Bed mobility  Supine to Sit: Moderate assistance  Sit to Supine: Unable to assess (up to chair at end of session)  Transfers  Stand Step Transfers: Minimal assistance (bed to chair to pt L with RW)  Sit to stand: Moderate assistance;2 Person assistance (up to RW with cues for safe hand placement)  Stand to sit: Moderate assistance;2 Person assistance        LUE AROM (degrees)  LUE AROM : WFL  RUE AROM (degrees)  RUE AROM : WFL            Plan   Plan  Times per week: 3-5x's a week while in acute care      AM-PAC Score        AM-[facility] Inpatient Daily Activity Raw Score: 15 (03/03/22 1739)  -PAC Inpatient ADL T-Scale Score : 34.69 (03/03/22 1739)  ADL Inpatient CMS 0-100% Score: 56.46 (03/03/22 1739)  ADL Inpatient CMS G-Code Modifier : CK (03/03/22 1739)    Goals  Short term goals  Time Frame for Short term goals: 1 week 3/10  Short term goal 1: Pt will complete toilet transfers with CGA  Short term goal 2: Pt will complete LE dressing with Yue  Short term goal 3: Pt will complete standing level ADLs with CGA for balance  Patient Goals   Patient goals : \"to get stronger\"       Therapy Time   Individual Concurrent Group Co-treatment   Time In 1630         Time Out 1703         Minutes 33         Timed Code Treatment Minutes: 23 Minutes       Derickan Inch, OTR/L  If pt is unable to be seen after this session, please let this note serve as discharge summary. Please see case management note for discharge disposition. Thank you.

## 2022-03-03 NOTE — PROGRESS NOTES
Hospitalist Progress Note    Date of Admission: 3/2/2022    Chief Complaint: SOB    Hospital Course:     Caleb Ford is a 81 y.o. female who was transferred from [facility] to [facility] for further evaluation and treatment by invasive cardiology. The patient was recently hospitalized in the ICU following COVID-19 diagnosis and subsequent ischemic cardiomyopathy with heart failure. Stress test was performed and noted to be abnormal therefore it was recommended that she be transferred to this facility for further intervention. Cardiac risk factors include: HTN, HLD, CMP, type II DM, and former smoker. Review of epic chart reveals the patient was experiencing worsening dyspnea with respiratory acidosis upon presentation to OSH therefore she expanded to the ICU and placed on BiPAP therapy. Echo was obtained revealing new onset systolic dysfunction in addition to severe PAH. Patient was diagnosed with SARS-CoV-2 during her hospital staY, despite being fully vaccinated and s/p booster x1. Notable labs prior to transfer include: JANAE with BUN 50/1.5 CrCl 29 cc/min, hyperglycemia 151, BNP 9052, and marked hyperlipidemia with statin intolerance. Subjective: No significant CP or SOB. No hypoxia. Labs:   Recent Labs     03/02/22  0509 03/03/22  0534   WBC 8.9 7.1   HGB 12.7 12.4   HCT 39.3 38.4    252     Recent Labs     03/01/22  0412 03/02/22  0509 03/03/22  0534    143 144   K 4.3 3.8 4.1   CL 98* 101 106   CO2 24 31 27   BUN 43* 50* 48*   CREATININE 1.2 1.5* 1.2   CALCIUM 10.3 10.1 10.2     Recent Labs     03/03/22  0534   AST 19   ALT 33   BILITOT 0.6   ALKPHOS 81     No results for input(s): INR in the last 72 hours. Physical Exam Performed:    /63   Pulse 81   Temp 97.9 °F (36.6 °C) (Oral)   Resp 20   SpO2 94%     General appearance:  No apparent distress, appears stated age and cooperative. HEENT:  Pupils equal, round, and reactive to light.  Conjunctivae/corneas clear.  Neck:  Supple, no jugular venous distention. Trachea midline with full range of motion. Respiratory:  Normal respiratory effort. Clear to auscultation, bilaterally without Rales/Wheezes/Rhonchi. Cardiovascular:  Regular rate and rhythm with normal S1/S2 without murmurs, rubs or gallops. Abdomen:  Soft, non-tender, non-distended with normal bowel sounds. Musculoskelatal:  No clubbing, cyanosis or edema bilaterally. Full range of motion without deformity. Neurologic:  Neurovascularly intact without any focal sensory/motor deficits. Cranial nerves: II-XII intact, grossly non-focal.  Psychiatric:  Alert and oriented, thought content appropriate, normal insight  Skin:  Skin color, texture, turgor normal.  No rashes or lesions.   Capillary Refill:  Brisk,< 3 seconds   Peripheral Pulses:  +2 palpable, equal bilaterally       Assessment/Plan:    Active Hospital Problems    Diagnosis     Ischemic cardiomyopathy [I25.5]     JANAE (acute kidney injury) ([address] [N17.9]     Abnormal stress test [R94.39]     Primary hypertension [I10]     Acute systolic CHF (congestive heart failure) (HCC) [I50.21]     COVID-19 [U07.1]     Mixed hyperlipidemia [E78.2]     Diabetes mellitus ([address] [E11.9]      Abnormal nuclear GXT  -Continue ACS a 81 mg daily   -HLD untreated due to intolerance to multiple statins  -Cardiology following  --Plan for [facility] Friday    Acute on chronic systolic CHF  -Continue current regimen with Coreg, Demadex, Aldactone, and Entresto  -ECHO noted  -RHC planned    Type II DM  -A1c ordered with results pending at time of dictation  -Home hypoglycemic medications placed on temporary hold  -POC glucose every 4 hours while NPO, with medium dose Humalog SSI  -Carb restriction recommended once diet resumed    COVID-19 in former smoker  -Continuous pulse oximetry monitoring initiated with PRN supplemental O2   -Encourage aggressive pulmonary toilet including incentive spirometry every 4H while awake  -Combivent scheduled Q4H while awake  -Positive test was 2/23/22, however, she presented to PCP on 2/22/22 with complaint of worsening SOB, so suspect symptom onset even sooner. Has completed > 10 days of appropriate Isolation and Droplet Plus precautions can be completed. DVT Prophylaxis: Lovenox  Diet: ADULT DIET; Regular; 3 carb choices (45 gm/meal);  Low Fat/Low Chol/High Fiber/JOSEP  Diet NPO Exceptions are: Sips of Water with Meds  Code Status: Full Code  PT/OT Eval Status: Pending course     Dispo - 2-3 days     Shad Pinto MD

## 2022-03-04 ENCOUNTER — APPOINTMENT (OUTPATIENT)
Dept: CARDIAC CATH/INVASIVE PROCEDURES | Age: 87
DRG: 286 | End: 2022-03-04
Attending: INTERNAL MEDICINE
Payer: MEDICARE

## 2022-03-04 LAB
ANION GAP SERPL CALCULATED.3IONS-SCNC: 13 MMOL/L (ref 3–16)
BUN BLDV-MCNC: 43 MG/DL (ref 7–20)
CALCIUM SERPL-MCNC: 10.2 MG/DL (ref 8.3–10.6)
CHLORIDE BLD-SCNC: 104 MMOL/L (ref 99–110)
CO2: 24 MMOL/L (ref 21–32)
CREAT SERPL-MCNC: 1.3 MG/DL (ref 0.6–1.2)
GFR AFRICAN AMERICAN: 47
GFR NON-AFRICAN AMERICAN: 39
GLUCOSE BLD-MCNC: 117 MG/DL (ref 70–99)
GLUCOSE BLD-MCNC: 127 MG/DL (ref 70–99)
GLUCOSE BLD-MCNC: 128 MG/DL (ref 70–99)
GLUCOSE BLD-MCNC: 136 MG/DL (ref 70–99)
GLUCOSE BLD-MCNC: 164 MG/DL (ref 70–99)
GLUCOSE BLD-MCNC: 263 MG/DL (ref 70–99)
GLUCOSE BLD-MCNC: 79 MG/DL (ref 70–99)
PERFORMED ON: ABNORMAL
PERFORMED ON: NORMAL
POTASSIUM SERPL-SCNC: 4.4 MMOL/L (ref 3.5–5.1)
PRO-BNP: 2902 PG/ML (ref 0–449)
SODIUM BLD-SCNC: 141 MMOL/L (ref 136–145)

## 2022-03-04 PROCEDURE — 83880 ASSAY OF NATRIURETIC PEPTIDE: CPT

## 2022-03-04 PROCEDURE — 99233 SBSQ HOSP IP/OBS HIGH 50: CPT | Performed by: HOSPITALIST

## 2022-03-04 PROCEDURE — 97530 THERAPEUTIC ACTIVITIES: CPT

## 2022-03-04 PROCEDURE — 2580000003 HC RX 258: Performed by: INTERNAL MEDICINE

## 2022-03-04 PROCEDURE — 6370000000 HC RX 637 (ALT 250 FOR IP): Performed by: HOSPITALIST

## 2022-03-04 PROCEDURE — 36415 COLL VENOUS BLD VENIPUNCTURE: CPT

## 2022-03-04 PROCEDURE — 6360000002 HC RX W HCPCS: Performed by: HOSPITALIST

## 2022-03-04 PROCEDURE — 1200000000 HC SEMI PRIVATE

## 2022-03-04 PROCEDURE — 97116 GAIT TRAINING THERAPY: CPT

## 2022-03-04 PROCEDURE — 97110 THERAPEUTIC EXERCISES: CPT

## 2022-03-04 PROCEDURE — 99232 SBSQ HOSP IP/OBS MODERATE 35: CPT | Performed by: NURSE PRACTITIONER

## 2022-03-04 PROCEDURE — 80048 BASIC METABOLIC PNL TOTAL CA: CPT

## 2022-03-04 PROCEDURE — 2580000003 HC RX 258: Performed by: NURSE PRACTITIONER

## 2022-03-04 PROCEDURE — 6370000000 HC RX 637 (ALT 250 FOR IP): Performed by: INTERNAL MEDICINE

## 2022-03-04 RX ORDER — SODIUM CHLORIDE 9 MG/ML
INJECTION, SOLUTION INTRAVENOUS CONTINUOUS
Status: CANCELLED | OUTPATIENT
Start: 2022-03-06

## 2022-03-04 RX ORDER — FUROSEMIDE 10 MG/ML
40 INJECTION INTRAMUSCULAR; INTRAVENOUS ONCE
Status: COMPLETED | OUTPATIENT
Start: 2022-03-04 | End: 2022-03-04

## 2022-03-04 RX ORDER — SODIUM CHLORIDE 9 MG/ML
INJECTION, SOLUTION INTRAVENOUS CONTINUOUS
Status: DISCONTINUED | OUTPATIENT
Start: 2022-03-04 | End: 2022-03-08

## 2022-03-04 RX ADMIN — ALLOPURINOL 100 MG: 100 TABLET ORAL at 13:44

## 2022-03-04 RX ADMIN — DOCUSATE SODIUM 100 MG: 100 CAPSULE, LIQUID FILLED ORAL at 09:13

## 2022-03-04 RX ADMIN — SODIUM CHLORIDE: 9 INJECTION, SOLUTION INTRAVENOUS at 18:58

## 2022-03-04 RX ADMIN — INSULIN LISPRO 2 UNITS: 100 INJECTION, SOLUTION INTRAVENOUS; SUBCUTANEOUS at 17:03

## 2022-03-04 RX ADMIN — FUROSEMIDE 40 MG: 10 INJECTION, SOLUTION INTRAMUSCULAR; INTRAVENOUS at 15:51

## 2022-03-04 RX ADMIN — INSULIN LISPRO 6 UNITS: 100 INJECTION, SOLUTION INTRAVENOUS; SUBCUTANEOUS at 20:29

## 2022-03-04 RX ADMIN — ASPIRIN 81 MG: 81 TABLET, COATED ORAL at 10:17

## 2022-03-04 RX ADMIN — CARVEDILOL 6.25 MG: 6.25 TABLET, FILM COATED ORAL at 10:17

## 2022-03-04 RX ADMIN — OXYCODONE HYDROCHLORIDE AND ACETAMINOPHEN 500 MG: 500 TABLET ORAL at 13:50

## 2022-03-04 RX ADMIN — CARVEDILOL 6.25 MG: 6.25 TABLET, FILM COATED ORAL at 17:03

## 2022-03-04 RX ADMIN — Medication 2000 UNITS: at 13:47

## 2022-03-04 RX ADMIN — OXYCODONE HYDROCHLORIDE AND ACETAMINOPHEN 500 MG: 500 TABLET ORAL at 20:28

## 2022-03-04 RX ADMIN — SODIUM CHLORIDE: 9 INJECTION, SOLUTION INTRAVENOUS at 01:00

## 2022-03-04 RX ADMIN — ENOXAPARIN SODIUM 30 MG: 30 INJECTION SUBCUTANEOUS at 13:48

## 2022-03-04 RX ADMIN — SODIUM CHLORIDE, PRESERVATIVE FREE 10 ML: 5 INJECTION INTRAVENOUS at 10:19

## 2022-03-04 ASSESSMENT — PAIN SCALES - GENERAL
PAINLEVEL_OUTOF10: 0
PAINLEVEL_OUTOF10: 0

## 2022-03-04 NOTE — PROGRESS NOTES
Hospitalist Progress Note    Date of Admission: 3/2/2022    Chief Complaint: SOB    Hospital Course:     Luigi Coelho is a 81 y.o. female who was transferred from [facility] to [facility] for further evaluation and treatment by invasive cardiology. The patient was recently hospitalized in the ICU following COVID-19 diagnosis and subsequent ischemic cardiomyopathy with heart failure. Stress test was performed and noted to be abnormal therefore it was recommended that she be transferred to this facility for further intervention. Cardiac risk factors include: HTN, HLD, CMP, type II DM, and former smoker. Review of epic chart reveals the patient was experiencing worsening dyspnea with respiratory acidosis upon presentation to OSH therefore she expanded to the ICU and placed on BiPAP therapy. Echo was obtained revealing new onset systolic dysfunction in addition to severe PAH. Patient was diagnosed with SARS-CoV-2 during her hospital staY, despite being fully vaccinated and s/p booster x1. Notable labs prior to transfer include: JANAE with BUN 50/1.5 CrCl 29 cc/min, hyperglycemia 151, BNP 9052, and marked hyperlipidemia with statin intolerance. Subjective: No SOB. [facility] cancelled d/t renal function. Labs:   Recent Labs     03/02/22  0509 03/03/22  0534   WBC 8.9 7.1   HGB 12.7 12.4   HCT 39.3 38.4    252     Recent Labs     03/02/22  0509 03/03/22  0534 03/04/22  0533    144 141   K 3.8 4.1 4.4    106 104   CO2 31 27 24   BUN 50* 48* 43*   CREATININE 1.5* 1.2 1.3*   CALCIUM 10.1 10.2 10.2     Recent Labs     03/03/22  0534   AST 19   ALT 33   BILITOT 0.6   ALKPHOS 81     No results for input(s): INR in the last 72 hours. Physical Exam Performed:    BP (!) 143/84   Pulse 84   Temp 98.5 °F (36.9 °C) (Oral)   Resp 20   SpO2 91%     General appearance:  No apparent distress, appears stated age and cooperative. HEENT:  Pupils equal, round, and reactive to light. Conjunctivae/corneas clear. Neck:  Supple, no jugular venous distention. Trachea midline with full range of motion. Respiratory:  Normal respiratory effort. Clear to auscultation, bilaterally without Rales/Wheezes/Rhonchi. Cardiovascular:  Regular rate and rhythm with normal S1/S2 without murmurs, rubs or gallops. Abdomen:  Soft, non-tender, non-distended with normal bowel sounds. Musculoskelatal:  No clubbing, cyanosis or edema bilaterally. Full range of motion without deformity. Neurologic:  Neurovascularly intact without any focal sensory/motor deficits. Cranial nerves: II-XII intact, grossly non-focal.  Psychiatric:  Alert and oriented, thought content appropriate, normal insight  Skin:  Skin color, texture, turgor normal.  No rashes or lesions. Capillary Refill:  Brisk,< 3 seconds   Peripheral Pulses:  +2 palpable, equal bilaterally       Assessment/Plan:    Active Hospital Problems    Diagnosis     Ischemic cardiomyopathy [I25.5]     JANAE (acute [facility]) [address] [N17.9]     Abnormal stress test [R94.39]     Primary hypertension [I10]     Acute systolic CHF (congestive heart failure) ([address] [I50.21]     COVID-19 [U07.1]     Mixed hyperlipidemia [E78.2]     Diabetes mellitus ([address] [E11.9]      Abnormal nuclear GXT  -Continue ACS a 81 mg daily   -HLD untreated due to intolerance to multiple statins  -Cardiology following  --Plan for [facility] on Monday if renal function improved/stable    Acute on chronic systolic CHF  -Continue current regimen with Coreg, Demadex, Aldactone, and Entresto  -ECHO noted  -RHC planned    JANAE/CKD: Improving/stable. Discussed with Nephrology. Hold off on further IVF for now to avoid fluid overload. Will hydrate again Sunday night before C. Monitor.      Type II DM  -POC glucose every 4 hours while NPO, with medium dose Humalog SSI  -Carb restriction recommended once diet resumed    COVID-19 in former smoker  -Continuous pulse oximetry monitoring initiated with PRN supplemental O2   -Encourage aggressive pulmonary toilet including incentive spirometry every 4H while awake  -Combivent scheduled Q4H while awake  -Positive test was 2/23/22, however, she presented to PCP on 2/22/22 with complaint of worsening SOB, so suspect symptom onset even sooner. Has completed > 10 days of appropriate Isolation and Droplet Plus precautions can be completed.      DVT Prophylaxis: Lovenox  Diet: Diet NPO Exceptions are: Sips of Water with Meds  Code Status: Full Code  PT/OT Eval Status: Pending course     Dispo - Pending [facility] Monday    Alie Santos MD

## 2022-03-04 NOTE — PROGRESS NOTES
Occupational Therapy  Facility/Department: [facility] C3 TELE/MED SURG/ONC  Daily Treatment Note  NAME: Tere Coles  : 1935  MRN: [id number]    Date of Service: 3/4/2022    Discharge Recommendations:  Subacute/Skilled Nursing Facility  OT Equipment Recommendations  Other: defer to next level of care  If pt is unable to be seen after this session, please let this note serve as discharge summary. Please see case management note for discharge disposition. Thank you. Assessment   Performance deficits / Impairments: Decreased functional mobility ; Decreased safe awareness;Decreased balance;Decreased ADL status; Decreased endurance;Decreased strength  Assessment: upon arrival, pt initially denied OT this date. willing to complete bed exercises after education of importance of OOB activity. Pt completed BUE HEP with mod fatigue in bed. Pt c/o inability to complete tasks but unwilling to complete bed mobility this date. Pt continues to benefit from skilled OT during actue stay. Recommend d/c to SNF for continued therapy. Prognosis: Good  OT Education: OT Role;Transfer Training;Plan of Care;ADL Adaptive Strategies; Equipment;Home Exercise Program  Patient Education: Disease specific: importanc of OOB activity, importance of BUE HEP,  REQUIRES OT FOLLOW UP: Yes  Activity Tolerance  Activity Tolerance: Patient Tolerated treatment well;Patient limited by fatigue  Activity Tolerance: after in bed BUE HEP, 89HR 92% O2. after 2 minutes of rest, 94% O2, 78 HR  Safety Devices  Type of devices: Gait belt;Call light within reach; Chair alarm in place;Nurse notified; Left in bed;Bed alarm in place         Patient Diagnosis(es): There were no encounter diagnoses. has a past medical history of Hypertension, Mixed hyperlipidemia, and Type II or unspecified type diabetes mellitus without mention of complication, not stated as uncontrolled.    has a past surgical history that includes lipoma resection and knee surgery (Right, 11/22/2016). Restrictions  Restrictions/Precautions  Restrictions/Precautions: General Precautions,Fall Risk  Position Activity Restriction  Other position/activity restrictions: High fall risk per nursing assessment, PureWick external female urinary catheter, telemetry  Subjective   General  Chart Reviewed: Davidsayda Abdalla and Physical  Patient assessed for rehabilitation services?: Yes  Family / Caregiver Present: No  Referring Practitioner: Gasper Head MD 3/02/22  Diagnosis: Acute systolic CHF  Subjective  Subjective: \"im not in pain, im achey all over. Do you know how hard it is to do therapy when I've been laying in this bed for 6 hours not doing anything? \"      Orientation  Orientation  Overall Orientation Status: Within Functional Limits  Orientation Level: Oriented to place;Oriented to time;Oriented to person;Disoriented to situation  Objective    ADL  Grooming: Setup (brush hair in bed)  Additional Comments: denied need for additional ADLs           Bed mobility  Comment: denied need for mobility this date     Type of ROM/Therapeutic Exercise  Type of ROM/Therapeutic Exercise: AROM  Comment: 10 reps x 2 sets of BUE: shoulder flexion/extension, chest press, overhead press.         Plan   Plan  Times per week: 3-5x's a week while in acute care  Current Treatment Recommendations: Strengthening,Endurance Training,Patient/Caregiver Education & Training,Self-Care / ADL,Functional Mobility Training,Safety Education & Training    AM-PAC Score        AM-[facility] Inpatient Daily Activity Raw Score: 15 (03/04/22 1050)  AM-PAC Inpatient ADL T-Scale Score : 34.69 (03/04/22 1050)  ADL Inpatient CMS 0-100% Score: 56.46 (03/04/22 1050)  ADL Inpatient CMS G-Code Modifier : CK (03/04/22 1050)    Goals  Short term goals  Time Frame for Short term goals: 1 week 3/10  Short term goal 1: Pt will complete toilet transfers with CGA  Short term goal 2: Pt will complete LE dressing with Yue  Short term goal 3: Pt will complete standing level ADLs with CGA for balance  Patient Goals   Patient goals : \"to get stronger\"       Therapy Time   Individual Concurrent Group Co-treatment   Time In 2186         Time Out 7526         Minutes 17         Timed Code Treatment Minutes: [address], OT

## 2022-03-04 NOTE — PROGRESS NOTES
Interv Cardiology:    Cath postponed today d/t ongoing renal insuffic. Will hold diuretics and order fluids through the weekend and tentatively plan for possible rt/lt cath on Monday 3/7/22. D/w family, bedside rn, hospitalist c.

## 2022-03-04 NOTE — ACP (ADVANCE CARE PLANNING)
Advance Care Planning   Healthcare Decision Maker:    Primary Decision Maker: Rocaelshweta Stewart Child - [phone]      Writer made referral to spiritual care re AD as has none on file. HUSAM Lentz

## 2022-03-04 NOTE — PROGRESS NOTES
Right after cath lab informed writer about cancelled producedure around 1300, writer immediately informed pt and pt's daughter that procedure was cancelled. Writer asked Dr. Jerome Echols to speak with daughter about cancellation and he spoke with her via telephone. Pt a/o. Stated no chest pain. Pt informed of diet advancement and ordered lunch and dinner. Pt stated no nausea; no emesis. Encouraging pt to eat but pt stated that she isn't a fan of the hospital food. With a lot of encouragement pt got up to chair with PT and sat up for a few hours. Assist x1 with walker. Zinc ointment and barrier wipes used on buttocks prn. Bed alarm on. Bed locked in lowest position; side rails 2/4; call light and bedside table within reach of pt.

## 2022-03-04 NOTE — FLOWSHEET NOTE
Left POA forms, family will discuss, possibly work out [place] w/. Pleasant follow up with family, brief word of prayer. Volunteer also visited earlier in the day. Roland Clifford       03/04/22 9619   Encounter Summary   Services provided to: Patient; Family   Referral/Consult From: Patient; Family   Support System Children;Family members   Volunteer Visit Yes   Complexity of Encounter Moderate   Length of Encounter 15 minutes   Spiritual Assessment Completed Yes   Spiritual/Mandaen   Type Spiritual support   Assessment Hopeful;Coping   Intervention Active listening;Prayer;Discussed meaning/purpose;Discussed relationship with God;Discussed belief system/Anglican practices/rohan   Outcome Expressed gratitude

## 2022-03-04 NOTE — FLOWSHEET NOTE
Pt alert and oriented. Answers questions appropriately. Assessment completed. Took meds without difficulty. Pt will be NPO after midnight for L&R heart cath tomorrow. Pt aware. Call light in reach. Safety measures maintained.

## 2022-03-04 NOTE — PROGRESS NOTES
Physical Therapy  Facility/Department: [facility] C3 TELE/MED SURG/ONC  Daily Treatment Note  NAME: Luigi Coelho  : 1935  MRN: [id number]    Date of Service: 3/4/2022    Discharge Recommendations:  Subacute/Skilled Nursing Facility   PT Equipment Recommendations  Equipment Needed: No  Other: TBD at SNF  If pt is unable to be seen after this session, please let this note serve as discharge summary. Please see case management note for discharge disposition. Thank you. Barriers to home discharge:   [x] Reported available assist at home upon discharge limited: alone  Assessment   Body structures, Functions, Activity limitations: Decreased functional mobility ; Decreased strength;Decreased safe awareness;Decreased endurance;Decreased cognition;Decreased balance;Decreased posture  Assessment: Patient is progressing with her therapy goals. However patient still needs moderate assistance for bed mobility, moderate assistance to stand and mod assist to ambulate 3 feet with a standard walker. Patient completed her supine and seated exercise program with guidance. Patient is still below her prior level of function and would benefit from skilled PT plan of care to address her PT deficits. PT recommends that this patient receive skilled PT in the SNF settting, when medically stable, in order to address her therapy deficits and to help her maximize her safety and independence with all functcional mobility. PT to continue to follow. Treatment Diagnosis: Decreased independence with mobility  Specific instructions for Next Treatment: Progress ther ex and mobility as tolerated  Prognosis: Good  Decision Making: Medium Complexity  PT Education: Goals; General Safety;PT Role;Plan of Care;Disease Specific Education; Functional Mobility Training;Orientation;Equipment;Precautions;Transfer Training;Energy Conservation  Patient Education: Disease-specific education: Pt educated on role of PT in hospital, benefits of regular OOB therapy session)  Scooting: Minimal assistance (to scoot to edge of bed)  Comment: head of bed elevated  Transfers  Sit to Stand: Moderate Assistance  Stand to sit: Moderate Assistance  Bed to Chair: Moderate assistance  Comment: cueing for safe hand placement. poor eccentric control of hip extensors. Ambulation  Ambulation?: Yes  More Ambulation?: No  Ambulation 1  Surface: level tile  Device: Standard Walker  Assistance: Moderate assistance  Quality of Gait: forward flexed posture, cueing to correct. 1 loss of balance. mod assist to correct. mod assist for SW placement. Gait Deviations: Slow Hallie;Decreased step length;Decreased step height  Distance: ~3 feet. Comments: No complaints of SOB, chest pain or dizziness. O2 did drop to 88% on room air on exertion. returned to 91% on room air with pursed lip breathing and seated rest break. RN Shelley Acevedo made aware. Stairs/Curb  Stairs?: No        Exercises  Straight Leg Raise: 1 x 10 bilateral  Quad Sets: 1 x 10 bilateral  Heelslides: 1 x 10 bilateral  Hip Flexion: 1 x 10 bilateral  Hip Abduction: 1 x 10 bilateral  Knee Long Arc Quad: 1 x 10 bilateral  Ankle Pumps: 1 x 10 bilateral  Comments: cueing for sequencing and technique.   Other exercises  Other exercises?: No                          AM-PAC Score     AM-PAC Inpatient Mobility without Stair Climbing Raw Score : 10 (03/04/22 1657)  AM-PAC Inpatient without Stair Climbing T-Scale Score : 34.07 (03/04/22 1657)  Mobility Inpatient CMS 0-100% Score: 71.66 (03/04/22 1657)  Mobility Inpatient without Stair CMS G-Code Modifier : CL (03/04/22 1657)       Goals  Short term goals  Time Frame for Short term goals: 1 week, 3/10/22 (unless otherwise specified)  Short term goal 1: Pt will transfer supine <-> sit with CGA x 1 3/4/22 mod assist  Short term goal 2: Pt will transfer sit <-> stand and bed>chair using RW with Yue x 1 3/4/22 mod assist  Short term goal 3: Pt will ambulate x 40 feet using RW with Yue x 1 3/4/22 3 feet with SW and mod assist  Short term goal 4: By 3/06/22: Pt will tolerate 12-15 reps BLE exercise for strengthening, balance, and endurance 3/04 continue goal.  Patient Goals   Patient goals : \"To get stronger\"    Plan    Plan  Times per week: 3-5x/week in acute care  Times per day: Daily  Specific instructions for Next Treatment: Progress ther ex and mobility as tolerated  Current Treatment Recommendations: Strengthening,Balance Training,Endurance Training,Functional Mobility Training,Transfer Training,Gait Training,Safety Education & Training,Equipment Evaluation, Education, & procurement,Positioning,Home Exercise Program,Patient/Caregiver Education & Training  Safety Devices  Type of devices:  All fall risk precautions in place,Left in chair,Call light within reach,Chair alarm in place,Nurse notified,Gait belt,Patient at risk for falls     Therapy Time   Individual Concurrent Group Co-treatment   Time In 1614         Time Out 1652         Minutes 38         Timed Code Treatment Minutes: 729 Kevin Vega       Belfastford Spatz, PT

## 2022-03-04 NOTE — DISCHARGE INSTR - COC
Continuity of Care Form    Patient Name: Angie Germain   :  1935  MRN:  [id number]    Admit date:  3/2/2022  Discharge date:  ***    Code Status Order: Full Code   Advance Directives:      Admitting Physician:  No admitting provider for patient encounter.   PCP: Anna Cochran DO    Discharging Nurse: [facility] Unit/Room#: 5045/9999-81  Discharging Unit Phone Number: ***    Emergency Contact:   Extended Emergency Contact Information  Primary Emergency Contact: Naveen Monroy  Address: [address] [place] [address], [address] [place] [address] Phone: [phone]  Mobile Phone: [phone]  Relation: Child  Secondary Emergency Contact: Jeanette Almonte  Address: Molly Dupree, [address] [place] [address] Phone: [phone]  Mobile Phone: [phone]  Relation: Child    Past Surgical History:  Past Surgical History:   Procedure Laterality Date    KNEE SURGERY Right 2016    LIPOMA RESECTION         Immunization History:   Immunization History   Administered Date(s) Administered    COVID-19, Pfizer Purple top, DILUTE for use, 12+ yrs, 30mcg/0.3mL dose 2021, 2021, 10/18/2021    Influenza Virus Vaccine 10/12/2011    Influenza Whole 10/14/2015    Influenza, High Dose (Fluzone 65 yrs and older) 10/14/2016, 10/03/2017, 10/22/2018, 10/21/2019    Influenza, High-dose, Jake Stevensty, 65 yrs +, IM (Fluzone) 2020    Influenza, Quadv, adjuvanted, 65 yrs +, IM, PF (Fluad) 10/12/2021    Pneumococcal Conjugate 13-valent (Keyes Masker) 2020       Active Problems:  Patient Active Problem List   Diagnosis Code    Osteoarthritis M19.90    Mixed hyperlipidemia E78.2    Essential hypertension I10    Diabetes mellitus ([facility] [address] E11.9    Vitamin D deficiency E55.9    Stress incontinence N39.3    Hypercalcemia E83.52    Shoulder impingement M75.40    Bilateral adhesive capsulitis of shoulders M75.01, M75.02    Ruptured Bakers cyst M66.0 Tear of medial meniscus of right knee, current S83.241A    Hyperuricemia E79.0    Cardiomyopathy ([facility]) I42.9    COVID-19 F39.4    Acute systolic CHF (congestive heart failure) ([facility]) I50.21    Primary hypertension I10    Abnormal stress test R94.39    Pulmonary HTN ([facility]) I27.20    Ischemic cardiomyopathy I25.5    JANAE (acute kidney injury) ([facility] [address] N17.9       Isolation/Infection:   Isolation            No Isolation          Patient Infection Status       Infection Onset Added Last Indicated Last Indicated By Review Planned Expiration Resolved Resolved By    None active    Resolved    COVID-19 (Rule Out) 02/23/22 02/23/22 02/23/22 COVID-19 & Influenza Combo (Ordered)   02/23/22 Rule-Out Test Resulted    COVID-19 02/23/22 02/23/22 02/23/22 COVID-19 & Influenza Combo   03/04/22 Alejo Davidson RN    Ordered last month    COVID-19 (Rule Out) 02/23/22 02/23/22 02/23/22 COVID-19 & Influenza Combo (Ordered)   02/23/22 Rule-Out Test Resulted            Nurse Assessment:  Last Vital Signs: /74   Pulse 79   Temp 97.4 °F (36.3 °C) (Oral)   Resp 18   SpO2 94%     Last documented pain score (0-10 scale): Pain Level: 0  Last Weight:   Wt Readings from Last 1 Encounters:   03/02/22 192 lb 8 oz (87.3 kg)     Mental Status:  {IP PT MENTAL STATUS:10590}    IV Access:  { LESIA IV ACCESS:646831058}    Nursing Mobility/ADLs:  Walking   {[facility] DME EINM:408154338}  Transfer  {[facility] DME VXFN:010075342}  Bathing  {[facility] DME VOCQ:158874819}  Dressing  {[facility] DME UPMV:048573973}  Toileting  {[facility] DME PLNR:654731364}  Feeding  {[facility] DME JBCC:715928405}  Med Admin  {[facility] DME GYHX:849302349}  Med Delivery   { LESIA MED Delivery:010727050}    Wound Care Documentation and Therapy:        Elimination:  Continence:    Bowel: {YES / WD:23021}  Bladder: {YES / RI:50042}  Urinary Catheter: {Urinary Catheter:780131574}   Colostomy/Ileostomy/Ileal Conduit: {YES / EJ:41835}       Date of Last BM: ***    Intake/Output Summary (Last 24 hours) at 3/4/2022 1800 [address], & [address] filed at 3/3/2022 1523  Gross per 24 hour   Intake --   Output 50 ml   Net -50 ml     I/O last 3 completed shifts:   In: 854 [I.V.:854]  Out: 350 [Urine:350]    Safety Concerns:     [employer] Safety Concerns:850938130}    Impairments/Disabilities:      [address] [employer] Impairments/Disabilities:033318313}    Nutrition Therapy:  Current Nutrition Therapy:   [address] [employer] Diet List:723353777}    Routes of Feeding: {CHP DME Other Feedings:786786066}  Liquids: {Slp liquid thickness:56295}  Daily Fluid Restriction: {CHP DME Yes amt example:971905827}  Last Modified Barium Swallow with Video (Video Swallowing Test): {Done Not Done DBTB:670660059}    Treatments at the Time of Hospital Discharge:   Respiratory Treatments: ***  Oxygen Therapy:  {Therapy; copd oxygen:91293}  Ventilator:    { CC Vent MXTX:861820591}    Rehab Therapies: {THERAPEUTIC INTERVENTION:4603510067}  Weight Bearing Status/Restrictions: [facility] Weight Bearin}  Other Medical Equipment (for information only, NOT a DME order):  {EQUIPMENT:148225074}  Other Treatments: ***    Patient's personal belongings (please select all that are sent with patient):  {[facility] DME Belongings:493796814}    RN SIGNATURE:  {Esignature:449539991}    CASE MANAGEMENT/SOCIAL WORK SECTION    Inpatient Status Date:2022      Readmission Risk Assessment Score:  Readmission Risk              Risk of Unplanned Readmission:  19           Discharging to Facility/ [address]     / signature: {Esignature:236856994}    PHYSICIAN SECTION    Prognosis: Good    Condition at Discharge: Stable    Rehab Potential (if transferring to Rehab): Good    Recommended Labs or Other Treatments After Discharge:   PT/OT  Follow-up with Cardiology to discuss high risk PCI    Physician Certification: I certify the above information and transfer of Sasha Cueva  is necessary for the continuing treatment of the diagnosis listed and that she requires [place] for less 30 days.      Update Admission H&P: No change in H&P    PHYSICIAN SIGNATURE:  Electronically signed by Sue Jha MD on 3/8/22 at 12:54 PM EST

## 2022-03-04 NOTE — PROGRESS NOTES
[address]   Daily Progress Note    Admit Date:  3/2/2022  HPI:  No chief complaint on file. Interval history: Donita Paniagua is being followed for shortness of breath. Presented to [facility] with dyspnea, work up showed LVEF drop, also treated for COVID pneumonia. Subjective:  Ms. Levi Mack no shortness of breath.      Objective:   BP (!) 140/78   Pulse 78   Temp 98.4 °F (36.9 °C) (Oral)   Resp 18   SpO2 92%       Intake/Output Summary (Last 24 hours) at 3/4/2022 1521  Last data filed at 3/4/2022 1404  Gross per 24 hour   Intake --   Output 500 ml   Net -500 ml       NYHA: IV    Physical Exam:  General:  Awake, alert, NAD, [place]  Skin:  Warm and dry  Neck:  JVD difficult to assess due to body habitus   Chest:  Dim to auscultation, no wheezes/rhonchi/rales  Telemetry: NSR   Cardiovascular:  RRR S1S2, no m/r/g   Abdomen:  Soft, nontender, +bowel sounds  Extremities:  no bilateral lower extremity edema    Medications:    furosemide  40 mg IntraVENous Once    sodium chloride flush  5-40 mL IntraVENous 2 times per day    sodium chloride flush  5-40 mL IntraVENous 2 times per day    polyethylene glycol  17 g Oral Daily    sodium chloride flush  5-40 mL IntraVENous 2 times per day    insulin lispro  0-12 Units SubCUTAneous Q4H    ascorbic acid  500 mg Oral BID    aspirin  81 mg Oral Daily    carvedilol  6.25 mg Oral BID WC    docusate sodium  100 mg Oral Daily    Vitamin D  2,000 Units Oral Daily    allopurinol  100 mg Oral Daily    sodium chloride flush  10 mL IntraVENous 2 times per day    enoxaparin  30 mg SubCUTAneous Daily    sodium chloride flush  5-40 mL IntraVENous 2 times per day      sodium chloride      sodium chloride      sodium chloride      dextrose      sodium chloride      sodium chloride         Lab Data:  CBC:   Recent Labs     03/02/22  0509 03/03/22  0534   WBC 8.9 7.1   HGB 12.7 12.4    252     BMP:    Recent Labs     03/02/22  0509 03/03/22  0534 03/04/22  0533    144 141   K 3.8 4.1 4.4   CO2 31 27 24   BUN 50* 48* 43*   CREATININE 1.5* 1.2 1.3*     INR:  No results for input(s): INR in the last 72 hours. BNP:    Recent Labs     03/04/22  0533   PROBNP 2,902*     Lab Results   Component Value Date    LVEF 70 11/21/2016       Testing:  Echo 2/23/22   Summary   LV systolic function is reduced with ejection fraction estimated at 35%.   There is mild global hypokinesis.   There is mild concentric left ventricular hypertrophy.   Elevated left ventricular diastolic filling pressure.   The right atrium is mildly dilated.   Mild posterior mitral annular calcification is present.   Aortic valve appears sclerotic but opens adequately.   Moderate mitral regurgitation.   Mild aortic, tricuspid, and pulmonic regurgitation is present.   Systolic pulmonary artery pressure (SPAP) estimated at 76 mmHg (RA pressure 8 mmHg), consistent with severe pulmonary hypertension.  Lei Jayme is a trivial circumferential pericardial effusion noted.   11/21/2016 EF greater than 70%Dynamic outflow tract obstruction, LVH, DD1, posterior MAC    Stress test 3/1/22   Summary  Moderate sized inferolateral and lateral partial reversibility defects  consistent with ischemia and infarction in the territory of the mid LCx  and/or RCA. LV function is moderately reduced with global hypokinesis and  ejection fraction of 32%. HIgher risk abnormal study. Principal Problem:    Acute systolic CHF (congestive heart failure) (HCC)  Active Problems:    Mixed hyperlipidemia    Diabetes mellitus ([facility] [address]    COVID-19    Primary hypertension    Abnormal stress test    Ischemic cardiomyopathy    JANAE (acute kidney injury) ([facility] [address]  Resolved Problems:    * No resolved hospital problems.  *      Assessment:  Acute systolic heart failure  Cardiomyopathy, HFrEF 35%  Acute hypoxemic respiratory failure- improved  COVID pneumonia   Severe pulmonary HTn  HTN  HLD- statin allergy  DM    Plan:  Continue to hold diuretics and entresto  Continue aspirin and coreg   Cardiac cath deferred today and will plan for Monday after discussing with Dr. Sheri Bhatt. Discussed with family. Monitor BMP daily  NPO after midnight Sunday and plan for IVF's over the weekend if creatinine increasing. Education provided today Disease process and medications discussed. Questions answered fully.  discussed with daughter regarding labs   Total Time spent educating today 15 minutes     POOL Canales CNP,  3/4/2022, 3:21 PM

## 2022-03-04 NOTE — CARE COORDINATION
Hospital day 2: Patient on C3 re Acute systolic CHF (congestive heart failure) followed by IM, Nephrology, and Cardiology. Patient accepted at [facility] can not accept until 03/06 re completed post COVID days. Per Augusta Larios she is on call this weekend and she will complete precert. Patient with plans for heart cath this date.  ct to follow. HUSAM Calvo

## 2022-03-04 NOTE — PROGRESS NOTES
Nephrology Progress  Note                                                                                                                                                                                                                                                                                                                                                               Office : [phone]     Fax :[phone]              Patient's Name: Manuel Wilkinson  11:48 AM  3/4/2022    Reason for Consult: JANAE      Requesting Physician:  Marshall Bess DO      Chief Complaint:  CHF    History of Present Ilness:    Manuel Wilkinson is a 81 y.o. female with PMH of HTN , CMP , DM , HTN    Transferred from [facility] for cardiology eval for ischemic cardiomyopathy    Stress test was performed and noted to be abnormal therefore it was recommended that she be transferred to this facility for further intervention. Nephrology consult for JANAE management before cardiac cath. Patient got IVF NS overnight ,serum cr trending down and close to baseline now    Denies chest pain or diarrhea or vomiting   Not in acute distress       Interval History : For cardiac cath today  Getting IVF NS  Not in acute distress  No diarrhea or vomiting     Past Medical History:   Diagnosis Date    Hypertension     Mixed hyperlipidemia     Hyperlipidemia    Type II or unspecified type diabetes mellitus without mention of complication, not stated as uncontrolled        Past Surgical History:   Procedure Laterality Date    KNEE SURGERY Right 11/22/2016    LIPOMA RESECTION         History reviewed. No pertinent family history. reports that she quit smoking about 38 years ago. She has never used smokeless tobacco. She reports that she does not drink alcohol and does not use drugs.     Allergies:  Pcn [penicillins], Statins, and Ibuprofen    Current Medications:    0.9 % sodium chloride infusion, Continuous  sodium chloride flush 0.9 % injection 5-40 mL, 2 times per day  sodium chloride flush 0.9 % injection 5-40 mL, PRN  0.9 % sodium chloride infusion, PRN  0.9 % sodium chloride infusion, Continuous  0.9 % sodium chloride infusion, PRN  sodium chloride flush 0.9 % injection 5-40 mL, 2 times per day  sodium chloride flush 0.9 % injection 5-40 mL, PRN  0.9 % sodium chloride infusion, PRN  magnesium sulfate 1000 mg in dextrose 5% 100 mL IVPB, PRN  ondansetron (ZOFRAN-ODT) disintegrating tablet 4 mg, Q8H PRN   Or  ondansetron (ZOFRAN) injection 4 mg, Q6H PRN  polyethylene glycol (GLYCOLAX) packet 17 g, Daily  potassium chloride (KLOR-CON M) extended release tablet 40 mEq, PRN   Or  potassium bicarb-citric acid (EFFER-K) effervescent tablet 40 mEq, PRN   Or  potassium chloride 10 mEq/100 mL IVPB (Peripheral Line), PRN  sodium chloride flush 0.9 % injection 5-40 mL, 2 times per day  sodium chloride flush 0.9 % injection 10 mL, PRN  insulin lispro (HUMALOG) injection vial 0-12 Units, Q4H  ascorbic acid (VITAMIN C) tablet 500 mg, BID  aspirin EC tablet 81 mg, Daily  bisacodyl (DULCOLAX) suppository 10 mg, Daily PRN  carvedilol (COREG) tablet 6.25 mg, BID WC  dextrose bolus (hypoglycemia) 10% 125 mL, PRN   Or  dextrose bolus (hypoglycemia) 10% 250 mL, PRN  docusate sodium (COLACE) capsule 100 mg, Daily  [Held by provider] spironolactone (ALDACTONE) tablet 25 mg, Lunch  Vitamin D (CHOLECALCIFEROL) tablet 2,000 Units, Daily  allopurinol (ZYLOPRIM) tablet 100 mg, Daily  glucose (GLUTOSE) 40 % oral gel 15 g, PRN  glucagon (rDNA) injection 1 mg, PRN  dextrose 5 % solution, PRN  sodium chloride flush 0.9 % injection 10 mL, 2 times per day  sodium chloride flush 0.9 % injection 10 mL, PRN  0.9 % sodium chloride infusion, PRN  magnesium sulfate 2000 mg in 50 mL IVPB premix, PRN  senna (SENOKOT) tablet 8.6 mg, Daily PRN  acetaminophen (TYLENOL) tablet 650 mg, Q6H PRN   Or  acetaminophen (TYLENOL) suppository 650 mg, Q6H PRN  ondansetron (ZOFRAN-ODT) disintegrating tablet 4 mg, Q8H PRN   Or  ondansetron (ZOFRAN) injection 4 mg, Q6H PRN  enoxaparin (LOVENOX) injection 30 mg, Daily  sodium chloride flush 0.9 % injection 5-40 mL, 2 times per day  sodium chloride flush 0.9 % injection 5-40 mL, PRN  0.9 % sodium chloride infusion, PRN  albuterol sulfate  (90 Base) MCG/ACT inhaler 2 puff, Q4H PRN   And  ipratropium (ATROVENT HFA) 17 MCG/ACT inhaler 2 puff, Q4H PRN        Review of Systems:   14 point ROS obtained but were negative except mentioned in HPI      Physical exam:     Vitals:  /74   Pulse 79   Temp 97.4 °F (36.3 °C) (Oral)   Resp 18   SpO2 94%   Constitutional:  OAA X3 NAD  Skin: no rash, turgor wnl  Heent:  eomi, mmm  Neck: no bruits or jvd noted  Cardiovascular:  S1, S2 without m/r/g  Respiratory: CTA B without w/r/r  Abdomen:  +bs, soft, nt, nd  Ext: no  lower extremity edema  Psychiatric: mood and affect appropriate  Musculoskeletal:  Rom, muscular strength intact    Data:   Labs:  CBC:   Recent Labs     03/02/22  0509 03/03/22  0534   WBC 8.9 7.1   HGB 12.7 12.4    252     BMP:    Recent Labs     03/02/22  0509 03/03/22  0534 03/04/22  0533    144 141   K 3.8 4.1 4.4    106 104   CO2 31 27 24   BUN 50* 48* 43*   CREATININE 1.5* 1.2 1.3*   GLUCOSE 151* 121* 136*     Ca/Mg/Phos:   Recent Labs     03/02/22  0509 03/03/22  0534 03/04/22  0533   CALCIUM 10.1 10.2 10.2     Hepatic:   Recent Labs     03/03/22  0534   AST 19   ALT 33   BILITOT 0.6   ALKPHOS 81     Troponin: No results for input(s): TROPONINI in the last 72 hours. BNP: No results for input(s): BNP in the last 72 hours. Lipids: No results for input(s): CHOL, TRIG, HDL, LDLCALC, LABVLDL in the last 72 hours. ABGs: No results for input(s): PHART, PO2ART, UFF8IXV in the last 72 hours. INR: No results for input(s): INR in the last 72 hours.   UA:No results for input(s): [facility], [facility], [facility], [address] [place] [address] [address], [address] [facility], Jaimie Khurram, NITRU, LEUKOCYTESUR, Elmer Couch in the last 72 hours. Urine Microscopic: No results for input(s): LABCAST, BACTERIA, COMU, HYALCAST, WBCUA, RBCUA, EPIU in the last 72 hours. Urine Culture: No results for input(s): LABURIN in the last 72 hours. Urine Chemistry:   Recent Labs     03/03/22  1035   LABCREA 110.9   NAUR 58             IMAGING:  No orders to display       Assessment/Plan     1.   Acute renal failure       Baseline serum cr ~ 1      2 CHF , acute       3 COIVD 19 infection     4 Ischemic cardiomyopathy    5 DLP    6 HTN    Plan    JANAE improving     Serum cr trend down to 1.3    Risk of post PCI DIANN per Crispin risk score ~ 20 %    Give  IVF NS  6 hrs before and after  cardiac cath as DIANN ppx    Use least possible amount of contrast     Hold diuretics     Hold ACEI or ARB    Avoid NSAIDs    Keep SBP > 120 mmhg                Thank you for allowing us to participate in care of Attila Blackman MD  Feel free to contact me   [facility] [address]  Office : [phone]  Fax :[phone]

## 2022-03-05 LAB
ANION GAP SERPL CALCULATED.3IONS-SCNC: 13 MMOL/L (ref 3–16)
BUN BLDV-MCNC: 38 MG/DL (ref 7–20)
CALCIUM SERPL-MCNC: 10.3 MG/DL (ref 8.3–10.6)
CHLORIDE BLD-SCNC: 104 MMOL/L (ref 99–110)
CO2: 26 MMOL/L (ref 21–32)
CREAT SERPL-MCNC: 1.2 MG/DL (ref 0.6–1.2)
GFR AFRICAN AMERICAN: 52
GFR NON-AFRICAN AMERICAN: 43
GLUCOSE BLD-MCNC: 109 MG/DL (ref 70–99)
GLUCOSE BLD-MCNC: 113 MG/DL (ref 70–99)
GLUCOSE BLD-MCNC: 114 MG/DL (ref 70–99)
GLUCOSE BLD-MCNC: 130 MG/DL (ref 70–99)
GLUCOSE BLD-MCNC: 139 MG/DL (ref 70–99)
GLUCOSE BLD-MCNC: 144 MG/DL (ref 70–99)
GLUCOSE BLD-MCNC: 159 MG/DL (ref 70–99)
GLUCOSE BLD-MCNC: 193 MG/DL (ref 70–99)
PERFORMED ON: ABNORMAL
POTASSIUM REFLEX MAGNESIUM: 4.4 MMOL/L (ref 3.5–5.1)
SODIUM BLD-SCNC: 143 MMOL/L (ref 136–145)

## 2022-03-05 PROCEDURE — 99233 SBSQ HOSP IP/OBS HIGH 50: CPT | Performed by: INTERNAL MEDICINE

## 2022-03-05 PROCEDURE — 6370000000 HC RX 637 (ALT 250 FOR IP): Performed by: INTERNAL MEDICINE

## 2022-03-05 PROCEDURE — 80048 BASIC METABOLIC PNL TOTAL CA: CPT

## 2022-03-05 PROCEDURE — 6370000000 HC RX 637 (ALT 250 FOR IP): Performed by: HOSPITALIST

## 2022-03-05 PROCEDURE — 6360000002 HC RX W HCPCS: Performed by: HOSPITALIST

## 2022-03-05 PROCEDURE — 1200000000 HC SEMI PRIVATE

## 2022-03-05 PROCEDURE — 36415 COLL VENOUS BLD VENIPUNCTURE: CPT

## 2022-03-05 RX ADMIN — OXYCODONE HYDROCHLORIDE AND ACETAMINOPHEN 500 MG: 500 TABLET ORAL at 20:33

## 2022-03-05 RX ADMIN — POLYETHYLENE GLYCOL 3350 17 G: 17 POWDER, FOR SOLUTION ORAL at 08:58

## 2022-03-05 RX ADMIN — OXYCODONE HYDROCHLORIDE AND ACETAMINOPHEN 500 MG: 500 TABLET ORAL at 08:58

## 2022-03-05 RX ADMIN — ENOXAPARIN SODIUM 30 MG: 30 INJECTION SUBCUTANEOUS at 08:58

## 2022-03-05 RX ADMIN — DOCUSATE SODIUM 100 MG: 100 CAPSULE, LIQUID FILLED ORAL at 08:58

## 2022-03-05 RX ADMIN — ASPIRIN 81 MG: 81 TABLET, COATED ORAL at 08:58

## 2022-03-05 RX ADMIN — INSULIN LISPRO 2 UNITS: 100 INJECTION, SOLUTION INTRAVENOUS; SUBCUTANEOUS at 11:14

## 2022-03-05 RX ADMIN — ALLOPURINOL 100 MG: 100 TABLET ORAL at 08:57

## 2022-03-05 RX ADMIN — INSULIN LISPRO 2 UNITS: 100 INJECTION, SOLUTION INTRAVENOUS; SUBCUTANEOUS at 16:52

## 2022-03-05 RX ADMIN — CARVEDILOL 6.25 MG: 6.25 TABLET, FILM COATED ORAL at 08:58

## 2022-03-05 RX ADMIN — CARVEDILOL 6.25 MG: 6.25 TABLET, FILM COATED ORAL at 16:50

## 2022-03-05 RX ADMIN — Medication 2000 UNITS: at 08:57

## 2022-03-05 NOTE — PROGRESS NOTES
Shift assessment updated. VSS. RM air. Patient tolerated breakfast. Lungs CTA. Patient repositioned. Will monitor.

## 2022-03-05 NOTE — PROGRESS NOTES
Hospitalist Progress Note    Date of Admission: 3/2/2022    Chief Complaint: SOB    Hospital Course:     Azam Dewey is a 81 y.o. female who was transferred from [facility] to [facility] for further evaluation and treatment by invasive cardiology. The patient was recently hospitalized in the ICU following COVID-19 diagnosis and subsequent ischemic cardiomyopathy with heart failure. Stress test was performed and noted to be abnormal therefore it was recommended that she be transferred to this facility for further intervention. Cardiac risk factors include: HTN, HLD, CMP, type II DM, and former smoker. Review of epic chart reveals the patient was experiencing worsening dyspnea with respiratory acidosis upon presentation to OSH therefore she expanded to the ICU and placed on BiPAP therapy. Echo was obtained revealing new onset systolic dysfunction in addition to severe PAH. Patient was diagnosed with SARS-CoV-2 during her hospital staY, despite being fully vaccinated and s/p booster x1. Notable labs prior to transfer include: JANAE with BUN 50/1.5 CrCl 29 cc/min, hyperglycemia 151, BNP 9052, and marked hyperlipidemia with statin intolerance. Subjective: No SOB or CP. Labs:   Recent Labs     03/03/22  0534   WBC 7.1   HGB 12.4   HCT 38.4        Recent Labs     03/03/22  0534 03/04/22  0533 03/05/22  0603    141 143   K 4.1 4.4 4.4    104 104   CO2 27 24 26   BUN 48* 43* 38*   CREATININE 1.2 1.3* 1.2   CALCIUM 10.2 10.2 10.3     Recent Labs     03/03/22  0534   AST 19   ALT 33   BILITOT 0.6   ALKPHOS 81     No results for input(s): INR in the last 72 hours. Physical Exam Performed:    /72   Pulse 95   Temp 98.6 °F (37 °C) (Oral)   Resp 18   Wt 195 lb 11.2 oz (88.8 kg)   SpO2 91%   BMI 34.67 kg/m²     General appearance:  No apparent distress, appears stated age and cooperative. HEENT:  Pupils equal, round, and reactive to light. Conjunctivae/corneas clear.   Neck: Supple, no jugular venous distention. Trachea midline with full range of motion. Respiratory:  Normal respiratory effort. Diminished but clear to auscultation, bilaterally without Rales/Wheezes/Rhonchi. Cardiovascular:  Regular rate and rhythm with normal S1/S2 without murmurs, rubs or gallops. Abdomen:  Soft, non-tender, non-distended with normal bowel sounds. Musculoskelatal:  No clubbing, cyanosis or edema bilaterally. Full range of motion without deformity. Neurologic:  Neurovascularly intact without any focal sensory/motor deficits. Cranial nerves: II-XII intact, grossly non-focal.  Psychiatric:  Alert and oriented, thought content appropriate, normal insight  Skin:  Skin color, texture, turgor normal.  No rashes or lesions. Capillary Refill:  Brisk,< 3 seconds   Peripheral Pulses:  +2 palpable, equal bilaterally       Assessment/Plan:    Active Hospital Problems    Diagnosis     Ischemic cardiomyopathy [I25.5]     JANAE (acute kidney injury) ([address] [N17.9]     Abnormal stress test [R94.39]     Primary hypertension [I10]     Acute systolic CHF (congestive heart failure) ([address] [I50.21]     COVID-19 [U07.1]     Mixed hyperlipidemia [E78.2]     Diabetes mellitus ([address] [E11.9]      Abnormal nuclear GXT  -Continue ACS a 81 mg daily   -HLD untreated due to intolerance to multiple statins  -Cardiology following  --Plan for [facility] on Monday if renal function improved/stable    Acute on chronic systolic CHF  -Continue current regimen with Coreg, Demadex, Aldactone, and Entresto  -ECHO noted  -RHC planned    JANAE/CKD: Improving/stable. Planning for [facility]. Discussed with Nephrology. OK per Nephrology to hold off on further IVF to avoid fluid overload, but Cardiology recommended gentle IVF 50 ml/hr over the weekend. Monitor.      Type II DM  -POC glucose every 4 hours while NPO, with medium dose Humalog SSI  -Carb restriction recommended once diet resumed    COVID-19 in former smoker  -Continuous pulse oximetry monitoring initiated with PRN supplemental O2   -Encourage aggressive pulmonary toilet including incentive spirometry every 4H while awake  -Combivent scheduled Q4H while awake  -Positive test was 2/23/22, however, she presented to PCP on 2/22/22 with complaint of worsening SOB, so suspect symptom onset even sooner. Has completed > 10 days of appropriate Isolation and Droplet Plus precautions were discontinued. DVT Prophylaxis: Lovenox  Diet: ADULT DIET;  Regular; 3 carb choices (45 gm/meal)  Code Status: Full Code  PT/OT Eval Status: Pending course     Dispo - Pending [facility] Monday    Alie Santos MD

## 2022-03-05 NOTE — PROGRESS NOTES
Pt assessment completed and charted. VSS. Pt a/o x4. Medications given per MAR. Lung sounds diminished. Fluids infusing at 50mL/hr. Pt turned Q2 hrs to prevent skin breakdown. Purewick in place. Bed in lowest position and wheels locked. Call light within reach. Bedside table within reach. Non-skid footwear in place. Pt denies any other needs at this time. Pt calls out appropriately. Will continue to monitor.

## 2022-03-05 NOTE — PROGRESS NOTES
Nephrology Progress  Note                                                                                                                                                                                                                                                                                                                                                               Office : [phone]     Fax :[phone]              Patient's Name: Clair Nguyen  9:00 AM  3/5/2022    Reason for Consult: JANAE      Requesting Physician:  Watson Canas DO      Chief Complaint:  CHF    History of Present Ilness:    Clair Nguyen is a 81 y.o. female with PMH of HTN , CMP , DM , HTN    Transferred from [facility] for cardiology eval for ischemic cardiomyopathy    Stress test was performed and noted to be abnormal therefore it was recommended that she be transferred to this facility for further intervention. Nephrology consult for JANAE management before cardiac cath. Patient got IVF NS overnight ,serum cr trending down and close to baseline now    Denies chest pain or diarrhea or vomiting   Not in acute distress       Interval History :     For cardiac   Getting IVF NS  Not in acute distress  No diarrhea or vomiting     Lasix held    Plan for [facility] on Monday     Creatinine trending down         Past Medical History:   Diagnosis Date    Hypertension     Mixed hyperlipidemia     Hyperlipidemia    Type II or unspecified type diabetes mellitus without mention of complication, not stated as uncontrolled        Past Surgical History:   Procedure Laterality Date    KNEE SURGERY Right 11/22/2016    LIPOMA RESECTION         Current Medications:    0.9 % sodium chloride infusion, Continuous  sodium chloride flush 0.9 % injection 5-40 mL, 2 times per day  sodium chloride flush 0.9 % injection 5-40 mL, PRN  0.9 % sodium chloride infusion, PRN  0.9 % sodium chloride infusion, PRN  sodium chloride flush 108 (90 Base) MCG/ACT inhaler 2 puff, Q4H PRN   And  ipratropium (ATROVENT HFA) 17 MCG/ACT inhaler 2 puff, Q4H PRN        Physical exam:     Vitals:  /66   Pulse 76   Temp 98.6 °F (37 °C) (Oral)   Resp 18   Wt 195 lb 11.2 oz (88.8 kg)   SpO2 91%   BMI 34.67 kg/m²   Constitutional:  OAA X3 NAD  Skin: no rash, turgor wnl  Heent:  eomi, mmm  Neck: no bruits or jvd noted  Cardiovascular:  S1, S2 without m/r/g  Respiratory: CTA B without w/r/r  Abdomen:  +bs, soft, nt, nd  Ext: no  lower extremity edema    Data:   Labs:  CBC:   Recent Labs     03/03/22  0534   WBC 7.1   HGB 12.4        BMP:    Recent Labs     03/03/22  0534 03/04/22  0533 03/05/22  0603    141 143   K 4.1 4.4 4.4    104 104   CO2 27 24 26   BUN 48* 43* 38*   CREATININE 1.2 1.3* 1.2   GLUCOSE 121* 136* 114*     Ca/Mg/Phos:   Recent Labs     03/03/22  0534 03/04/22  0533 03/05/22  0603   CALCIUM 10.2 10.2 10.3     Hepatic:   Recent Labs     03/03/22  0534   AST 19   ALT 33   BILITOT 0.6   ALKPHOS 81     Troponin: No results for input(s): TROPONINI in the last 72 hours. BNP: No results for input(s): BNP in the last 72 hours. Lipids: No results for input(s): CHOL, TRIG, HDL, LDLCALC, LABVLDL in the last 72 hours. ABGs: No results for input(s): PHART, PO2ART, VWB2WPS in the last 72 hours. INR: No results for input(s): INR in the last 72 hours. UA:No results for input(s): Anjum Migdalia, GLUCOSEU, BILIRUBINUR, Drucella Desi, BLOODU, PHUR, PROTEINU, UROBILINOGEN, NITRU, LEUKOCYTESUR, LABMICR, URINETYPE in the last 72 hours. Urine Microscopic: No results for input(s): LABCAST, BACTERIA, COMU, HYALCAST, WBCUA, RBCUA, EPIU in the last 72 hours. Urine Culture: No results for input(s): LABURIN in the last 72 hours. Urine Chemistry:   Recent Labs     03/03/22  1035   LABCREA 110.9   NAUR 58             IMAGING:  No orders to display       Assessment/Plan     1.   Acute renal failure       Baseline serum cr ~ 1      2 CHF , acute       3 COIVD 19 infection     4 Ischemic cardiomyopathy    5 DLP    6 HTN    Plan    JANAE improving     Serum cr trend down to 1.3    Risk of post PCI DIANN per Crispin risk score ~ 20 %    Give  IVF NS  6 hrs before and after  cardiac cath as DIANN ppx    Use least possible amount of contrast     Hold diuretics     Hold ACEI or ARB    Avoid NSAIDs    Keep SBP > 120 mmhg                Thank you for allowing us to participate in care of Rhonda Thakur MD  Feel free to contact me   [facility] [address]  Office : [phone]  Fax :[phone]

## 2022-03-06 LAB
ANION GAP SERPL CALCULATED.3IONS-SCNC: 10 MMOL/L (ref 3–16)
BUN BLDV-MCNC: 33 MG/DL (ref 7–20)
CALCIUM SERPL-MCNC: 9.5 MG/DL (ref 8.3–10.6)
CHLORIDE BLD-SCNC: 106 MMOL/L (ref 99–110)
CO2: 23 MMOL/L (ref 21–32)
CREAT SERPL-MCNC: 1.1 MG/DL (ref 0.6–1.2)
GFR AFRICAN AMERICAN: 57
GFR NON-AFRICAN AMERICAN: 47
GLUCOSE BLD-MCNC: 127 MG/DL (ref 70–99)
GLUCOSE BLD-MCNC: 129 MG/DL (ref 70–99)
GLUCOSE BLD-MCNC: 132 MG/DL (ref 70–99)
GLUCOSE BLD-MCNC: 137 MG/DL (ref 70–99)
GLUCOSE BLD-MCNC: 154 MG/DL (ref 70–99)
GLUCOSE BLD-MCNC: 193 MG/DL (ref 70–99)
PERFORMED ON: ABNORMAL
POTASSIUM REFLEX MAGNESIUM: 3.8 MMOL/L (ref 3.5–5.1)
SODIUM BLD-SCNC: 139 MMOL/L (ref 136–145)

## 2022-03-06 PROCEDURE — 2580000003 HC RX 258: Performed by: INTERNAL MEDICINE

## 2022-03-06 PROCEDURE — 97116 GAIT TRAINING THERAPY: CPT

## 2022-03-06 PROCEDURE — 99233 SBSQ HOSP IP/OBS HIGH 50: CPT | Performed by: INTERNAL MEDICINE

## 2022-03-06 PROCEDURE — 6360000002 HC RX W HCPCS: Performed by: HOSPITALIST

## 2022-03-06 PROCEDURE — 6370000000 HC RX 637 (ALT 250 FOR IP): Performed by: INTERNAL MEDICINE

## 2022-03-06 PROCEDURE — 6370000000 HC RX 637 (ALT 250 FOR IP): Performed by: HOSPITALIST

## 2022-03-06 PROCEDURE — 36415 COLL VENOUS BLD VENIPUNCTURE: CPT

## 2022-03-06 PROCEDURE — 97530 THERAPEUTIC ACTIVITIES: CPT

## 2022-03-06 PROCEDURE — 1200000000 HC SEMI PRIVATE

## 2022-03-06 PROCEDURE — 80048 BASIC METABOLIC PNL TOTAL CA: CPT

## 2022-03-06 RX ADMIN — OXYCODONE HYDROCHLORIDE AND ACETAMINOPHEN 500 MG: 500 TABLET ORAL at 09:37

## 2022-03-06 RX ADMIN — CARVEDILOL 6.25 MG: 6.25 TABLET, FILM COATED ORAL at 09:40

## 2022-03-06 RX ADMIN — ENOXAPARIN SODIUM 30 MG: 30 INJECTION SUBCUTANEOUS at 09:37

## 2022-03-06 RX ADMIN — Medication 2000 UNITS: at 09:37

## 2022-03-06 RX ADMIN — SODIUM CHLORIDE: 9 INJECTION, SOLUTION INTRAVENOUS at 06:24

## 2022-03-06 RX ADMIN — DOCUSATE SODIUM 100 MG: 100 CAPSULE, LIQUID FILLED ORAL at 09:37

## 2022-03-06 RX ADMIN — SODIUM CHLORIDE, PRESERVATIVE FREE 10 ML: 5 INJECTION INTRAVENOUS at 20:21

## 2022-03-06 RX ADMIN — OXYCODONE HYDROCHLORIDE AND ACETAMINOPHEN 500 MG: 500 TABLET ORAL at 21:40

## 2022-03-06 RX ADMIN — INSULIN LISPRO 2 UNITS: 100 INJECTION, SOLUTION INTRAVENOUS; SUBCUTANEOUS at 11:54

## 2022-03-06 RX ADMIN — ASPIRIN 81 MG: 81 TABLET, COATED ORAL at 09:37

## 2022-03-06 RX ADMIN — INSULIN LISPRO 2 UNITS: 100 INJECTION, SOLUTION INTRAVENOUS; SUBCUTANEOUS at 16:31

## 2022-03-06 RX ADMIN — ALLOPURINOL 100 MG: 100 TABLET ORAL at 09:37

## 2022-03-06 RX ADMIN — CARVEDILOL 6.25 MG: 6.25 TABLET, FILM COATED ORAL at 16:42

## 2022-03-06 NOTE — PLAN OF CARE
No new skin breakdown noted. Monitoring for incontinence and turning q2hr.   Problem: Skin Integrity:  Goal: Will show no infection signs and symptoms  Description: Will show no infection signs and symptoms  Outcome: Ongoing  Goal: Absence of new skin breakdown  Description: Absence of new skin breakdown  Outcome: Ongoing

## 2022-03-06 NOTE — PROGRESS NOTES
Nephrology Progress  Note                                                                                                                                                                                                                                                                                                                                                               Office : [phone]     Fax :[phone]              Patient's Name: Julius Canas  10:13 AM  3/6/2022    Reason for Consult: JANAE      Requesting Physician:  Rupali Simpson DO      Chief Complaint:  CHF    History of Present Ilness:    Julius Canas is a 81 y.o. female with PMH of HTN , CMP , DM , HTN    Transferred from [facility] for cardiology eval for ischemic cardiomyopathy    Stress test was performed and noted to be abnormal therefore it was recommended that she be transferred to this facility for further intervention. Nephrology consult for JANAE management before cardiac cath. Patient got IVF NS overnight ,serum cr trending down and close to baseline now    Denies chest pain or diarrhea or vomiting   Not in acute distress       Interval History :     For cardiac   Getting IVF NS  Not in acute distress  No diarrhea or vomiting     Lasix held    Plan for [facility] on Monday     Creatinine trending down         Past Medical History:   Diagnosis Date    Hypertension     Mixed hyperlipidemia     Hyperlipidemia    Type II or unspecified type diabetes mellitus without mention of complication, not stated as uncontrolled        Past Surgical History:   Procedure Laterality Date    KNEE SURGERY Right 11/22/2016    LIPOMA RESECTION         Current Medications:    insulin lispro (HUMALOG) injection vial 0-12 Units, TID WC  0.9 % sodium chloride infusion, Continuous  sodium chloride flush 0.9 % injection 5-40 mL, PRN  0.9 % sodium chloride infusion, PRN  0.9 % sodium chloride infusion, PRN  sodium chloride flush 0.9 % injection 5-40 mL, 2 times per day  sodium chloride flush 0.9 % injection 5-40 mL, PRN  0.9 % sodium chloride infusion, PRN  magnesium sulfate 1000 mg in dextrose 5% 100 mL IVPB, PRN  ondansetron (ZOFRAN-ODT) disintegrating tablet 4 mg, Q8H PRN   Or  ondansetron (ZOFRAN) injection 4 mg, Q6H PRN  polyethylene glycol (GLYCOLAX) packet 17 g, Daily  potassium chloride (KLOR-CON M) extended release tablet 40 mEq, PRN   Or  potassium bicarb-citric acid (EFFER-K) effervescent tablet 40 mEq, PRN   Or  potassium chloride 10 mEq/100 mL IVPB (Peripheral Line), PRN  sodium chloride flush 0.9 % injection 10 mL, PRN  ascorbic acid (VITAMIN C) tablet 500 mg, BID  aspirin EC tablet 81 mg, Daily  bisacodyl (DULCOLAX) suppository 10 mg, Daily PRN  carvedilol (COREG) tablet 6.25 mg, BID WC  dextrose bolus (hypoglycemia) 10% 125 mL, PRN   Or  dextrose bolus (hypoglycemia) 10% 250 mL, PRN  docusate sodium (COLACE) capsule 100 mg, Daily  Vitamin D (CHOLECALCIFEROL) tablet 2,000 Units, Daily  allopurinol (ZYLOPRIM) tablet 100 mg, Daily  glucose (GLUTOSE) 40 % oral gel 15 g, PRN  glucagon (rDNA) injection 1 mg, PRN  dextrose 5 % solution, PRN  sodium chloride flush 0.9 % injection 10 mL, PRN  0.9 % sodium chloride infusion, PRN  magnesium sulfate 2000 mg in 50 mL IVPB premix, PRN  senna (SENOKOT) tablet 8.6 mg, Daily PRN  acetaminophen (TYLENOL) tablet 650 mg, Q6H PRN   Or  acetaminophen (TYLENOL) suppository 650 mg, Q6H PRN  ondansetron (ZOFRAN-ODT) disintegrating tablet 4 mg, Q8H PRN   Or  ondansetron (ZOFRAN) injection 4 mg, Q6H PRN  enoxaparin (LOVENOX) injection 30 mg, Daily  sodium chloride flush 0.9 % injection 5-40 mL, PRN  0.9 % sodium chloride infusion, PRN  albuterol sulfate  (90 Base) MCG/ACT inhaler 2 puff, Q4H PRN   And  ipratropium (ATROVENT HFA) 17 MCG/ACT inhaler 2 puff, Q4H PRN        Physical exam:     Vitals:  /81   Pulse 76   Temp 97.5 °F (36.4 °C) (Oral)   Resp 16   Wt 192 lb (87.1 kg)   SpO2 92% BMI 34.01 kg/m²   Constitutional:  OAA X3 NAD  Skin: no rash, turgor wnl  Heent:  eomi, mmm  Neck: no bruits or jvd noted  Cardiovascular:  S1, S2 without m/r/g  Respiratory: CTA B without w/r/r  Abdomen:  +bs, soft, nt, nd  Ext: no  lower extremity edema    Data:   Labs:  CBC:   No results for input(s): WBC, HGB, PLT in the last 72 hours. BMP:    Recent Labs     03/04/22 0533 03/05/22  0603 03/06/22  0621    143 139   K 4.4 4.4 3.8    104 106   CO2 24 26 23   BUN 43* 38* 33*   CREATININE 1.3* 1.2 1.1   GLUCOSE 136* 114* 137*     Ca/Mg/Phos:   Recent Labs     03/04/22 0533 03/05/22 0603 03/06/22  0621   CALCIUM 10.2 10.3 9.5     Hepatic:   No results for input(s): AST, ALT, ALB, BILITOT, ALKPHOS in the last 72 hours. Troponin: No results for input(s): TROPONINI in the last 72 hours. BNP: No results for input(s): BNP in the last 72 hours. Lipids: No results for input(s): CHOL, TRIG, HDL, LDLCALC, LABVLDL in the last 72 hours. ABGs: No results for input(s): PHART, PO2ART, MER1UJU in the last 72 hours. INR: No results for input(s): INR in the last 72 hours. UA:No results for input(s): Estel Scriver, GLUCOSEU, BILIRUBINUR, Violet Samantha, BLOODU, PHUR, PROTEINU, UROBILINOGEN, NITRU, LEUKOCYTESUR, LABMICR, URINETYPE in the last 72 hours. Urine Microscopic: No results for input(s): LABCAST, BACTERIA, COMU, HYALCAST, WBCUA, RBCUA, EPIU in the last 72 hours. Urine Culture: No results for input(s): LABURIN in the last 72 hours. Urine Chemistry:   Recent Labs     03/03/22  1035   LABCREA 110.9   NAUR 58             IMAGING:  No orders to display       Assessment/Plan     1.   Acute renal failure     Creat almost at baseline     Baseline serum cr ~ 1      2 CHF , acute       3 COIVD 19 infection     4 Ischemic cardiomyopathy    5 DLP    6 HTN            Risk of post PCI DIANN per Crispin risk score ~ 20 %    Continue gentle fluids - NS     Use least possible amount of contrast     Hold diuretics Hold ACEI or ARB    Avoid NSAIDs    Keep SBP > 120 mmhg                Thank you for allowing us to participate in care of Kahlil Marino MD  Feel free to contact me   [facility] [address]  Office : [phone]  Fax :[phone]

## 2022-03-06 NOTE — PROGRESS NOTES
jugular venous distention. Trachea midline with full range of motion. Respiratory:  Normal respiratory effort. Diminished but clear to auscultation, bilaterally without Rales/Wheezes/Rhonchi. Cardiovascular:  Regular rate and rhythm with normal S1/S2 without murmurs, rubs or gallops. Abdomen:  Soft, non-tender, non-distended with normal bowel sounds. Musculoskelatal:  No clubbing, cyanosis or edema bilaterally. Full range of motion without deformity. Neurologic:  Neurovascularly intact without any focal sensory/motor deficits. Cranial nerves: II-XII intact, grossly non-focal.  Psychiatric:  Alert and oriented, thought content appropriate, normal insight  Skin:  Skin color, texture, turgor normal.  No rashes or lesions. Capillary Refill:  Brisk,< 3 seconds   Peripheral Pulses:  +2 palpable, equal bilaterally       Assessment/Plan:    Active Hospital Problems    Diagnosis     Ischemic cardiomyopathy [I25.5]     JANAE (acute kidney injury) ([facility] [N17.9]     Abnormal stress test [R94.39]     Primary hypertension [I10]     Acute systolic CHF (congestive heart failure) ([facility] [I50.21]     COVID-19 [U07.1]     Mixed hyperlipidemia [E78.2]     Diabetes mellitus ([address] [E11.9]      Abnormal nuclear GXT  -Continue ACS a 81 mg daily   -HLD untreated due to intolerance to multiple statins  -Cardiology following  --Plan for [facility] on Monday as renal function improved/stable    Acute on chronic systolic CHF  -Continue current regimen with Coreg, Demadex, Aldactone, and Entresto  -ECHO noted  -RHC planned    JANAE/CKD: Improving/stable. Planning for [facility]. Discussed with Nephrology. OK per Nephrology to hold off on further IVF to avoid fluid overload, but Cardiology recommended gentle IVF 50 ml/hr over the weekend. Monitor.      Type II DM  -POC glucose every 4 hours while NPO, with medium dose Humalog SSI  -Carb restriction recommended once diet resumed    COVID-19 in former smoker  -Continuous pulse oximetry monitoring

## 2022-03-06 NOTE — PROGRESS NOTES
Physical Therapy  Facility/Department: [facility] C3 TELE/MED SURG/ONC  Daily Treatment Note  NAME: Gurmeet Cisneros  : 1935  MRN: [id number]    Date of Service: 3/6/2022    Discharge Recommendations:  Subacute/Skilled Nursing Facility   PT Equipment Recommendations  Equipment Needed: No  Other: TBD at SNF    Assessment   Body structures, Functions, Activity limitations: Decreased functional mobility ; Decreased strength;Decreased safe awareness;Decreased endurance;Decreased cognition;Decreased balance;Decreased posture  Assessment: Pt continues to progress towards PT goals. Pt requires mod(A) for bed mobility, mod(A) for transfers and mod-max(A) for ambulation with SW. Pt may benefit from RW next session due to difficulty manuevering SW. Pt demo poor command following and safety awareness during ambulation, attempting to sit with no chair behind her and requires max verbal cues for upright posture and to remain standing. Pt denies dizziness and shortness of breath, states she \"just can't stand any longer\" Rn brought chair behind pt and pt assisted safely into chair. Cam Rimes used for transfer from chair to bed. Pt would benefit from continued skilled PT to address deficits listed above. Recommend SNF upon d/c. Treatment Diagnosis: Decreased independence with mobility  Prognosis: Good  Decision Making: Medium Complexity  PT Education: Goals; General Safety;PT Role;Plan of Care;Disease Specific Education; Functional Mobility Training;Orientation;Equipment;Precautions;Transfer Training;Energy Conservation  Patient Education: Disease-specific education: Pt educated on role of PT in hospital, benefits of regular OOB mobility, general safety during hospital stay - pt verbalizes understanding, will likely require some reinforcement. REQUIRES PT FOLLOW UP: Yes  Activity Tolerance  Activity Tolerance: Patient limited by endurance; Patient limited by fatigue  Activity Tolerance: /64, HR 93 bpm, SpO2 92% on RA;  After ambulation, SpO2 93% on RA     Patient Diagnosis(es): There were no encounter diagnoses. has a past medical history of Hypertension, Mixed hyperlipidemia, and Type II or unspecified type diabetes mellitus without mention of complication, not stated as uncontrolled. has a past surgical history that includes lipoma resection and knee surgery (Right, 11/22/2016). Restrictions  Restrictions/Precautions  Restrictions/Precautions: General Precautions,Fall Risk  Position Activity Restriction  Other position/activity restrictions: High fall risk per nursing assessment, PureWick, telemetry     Subjective   General  Chart Reviewed: Yes  Additional Pertinent Hx: Recently COVID+ although out of isolation as of 3/03/22  Response To Previous Treatment: Patient with no complaints from previous session. Family / Caregiver Present: No  Referring Practitioner: Ty Ponce MD  Subjective  Subjective: Pt agreeable to PT this date. Supine in bed upon approach  General Comment  Comments: RN cleard pt for therapy session  Pain Screening  Patient Currently in Pain: Denies  Vital Signs  Patient Currently in Pain: Denies     Objective   Bed mobility  Rolling to Left: Moderate assistance  Rolling to Right: Moderate assistance  Supine to Sit: Moderate assistance  Sit to Supine: Moderate assistance  Scooting: Minimal assistance  Comment: Rolling with HOB flat to change brief due to incontinence, mod(A) for supine <> sit at trunk and BLE. Verbal cues for sequencing and initiation for all bed mobility     Transfers  Sit to Stand: Moderate Assistance  Stand to sit: Moderate Assistance  Comment: Pt requires mod(A) for STS from EOB. Once standing, pt perform standing marches at SW with slight B foot clearance and min(A) for balance. Gee Codie used for transfer from chair to bed after ambulation with mod(A)x2 for STS from chair and verbal cues for safety.      Ambulation  Ambulation?: Yes  Ambulation 1  Surface: level tile  Device: Standard Walker  Assistance: Moderate assistance;Maximum assistance  Quality of Gait: forward flexed posture, short, shuffled steps, mod assist for SW placement. Gait Deviations: Slow Hallie;Decreased step length;Decreased step height  Distance: 5 ft  Comments: Pt ambulates 5 ft with SW with max(A) to manage SW and mod(A) for balance. After 5 ft, pt states she can not take anymore steps. Max(A) and verbal cues to remain standing, called RN to bring chair behind pt. pt then requires max verbal cues to sit safely in chair. Stairs/Curb  Stairs?: No     Balance  Posture: Fair  Sitting - Static: Good;-  Sitting - Dynamic: Fair  Standing - Static: Fair;-  Standing - Dynamic: Poor        AM-PAC Score     AM-PAC Inpatient Mobility without Stair Climbing Raw Score : 10 (03/06/22 1010)  AM-PAC Inpatient without Stair Climbing T-Scale Score : 34.07 (03/06/22 1010)  Mobility Inpatient CMS 0-100% Score: 71.66 (03/06/22 1010)  Mobility Inpatient without Stair CMS G-Code Modifier : CL (03/06/22 1010)       Goals  Short term goals  Time Frame for Short term goals: 1 week, 3/10/22 (unless otherwise specified)  Short term goal 1: Pt will transfer supine <-> sit with CGA x 1--3/6/22 mod assist  Short term goal 2: Pt will transfer sit <-> stand and bed>chair using RW with Yue x 1--3/6/22 mod-max assist  Short term goal 3: Pt will ambulate x 40 feet using RW with Yue x --3/6/22 5 feet with SW and max assist  Short term goal 4: By 3/06/22: Pt will tolerate 12-15 reps BLE exercise for strengthening, balance, and endurance--3/6 continue goal.  Patient Goals   Patient goals :  \"To get stronger\"    Plan    Plan  Times per week: 3-5x/week in acute care  Times per day: Daily  Specific instructions for Next Treatment: Progress ther ex and mobility as tolerated  Current Treatment Recommendations: [facility] Mobility Training,Transfer [employer] & Training,Equipment Evaluation, Education, & procurement,Positioning,Home Exercise Program,Patient/Caregiver Education & Training  Safety Devices  Type of devices: All fall risk precautions in place,Call light within reach,Nurse notified,Gait belt,Patient at risk for falls,Bed alarm in place,Left in bed     Therapy Time   Individual Concurrent Group Co-treatment   Time In 0850         Time Out 0930         Minutes 40               If pt is unable to be seen after this session, please let this note serve as discharge summary. Please see case management note for discharge disposition. Thank you.     Stefan Bello, PT

## 2022-03-06 NOTE — PLAN OF CARE
Problem: Skin Integrity:  Goal: Will show no infection signs and symptoms  Description: Will show no infection signs and symptoms  3/6/2022 0941 by Trino Rojas RN  Outcome: Ongoing  3/6/2022 0132 by Eliz Weiner RN  Outcome: Ongoing  Goal: Absence of new skin breakdown  Description: Absence of new skin breakdown  3/6/2022 0132 by Eliz Weiner RN  Outcome: Ongoing

## 2022-03-06 NOTE — PROGRESS NOTES
Assessment completed and documented. VSS. A/ox4, hard of hearing. Denies pain. Q2turn, refused at times. purewick replaced. stedy x1-x2 for transfers. Bed locked and in lowest position. Bedside table and call light within reach. Denies further needs at this time.

## 2022-03-07 ENCOUNTER — APPOINTMENT (OUTPATIENT)
Dept: CARDIAC CATH/INVASIVE PROCEDURES | Age: 87
DRG: 286 | End: 2022-03-07
Attending: INTERNAL MEDICINE
Payer: MEDICARE

## 2022-03-07 LAB
ANION GAP SERPL CALCULATED.3IONS-SCNC: 11 MMOL/L (ref 3–16)
ANION GAP SERPL CALCULATED.3IONS-SCNC: 11 MMOL/L (ref 3–16)
BUN BLDV-MCNC: 26 MG/DL (ref 7–20)
BUN BLDV-MCNC: 26 MG/DL (ref 7–20)
CALCIUM SERPL-MCNC: 10.1 MG/DL (ref 8.3–10.6)
CALCIUM SERPL-MCNC: 9.5 MG/DL (ref 8.3–10.6)
CHLORIDE BLD-SCNC: 105 MMOL/L (ref 99–110)
CHLORIDE BLD-SCNC: 108 MMOL/L (ref 99–110)
CO2: 23 MMOL/L (ref 21–32)
CO2: 25 MMOL/L (ref 21–32)
CREAT SERPL-MCNC: 0.9 MG/DL (ref 0.6–1.2)
CREAT SERPL-MCNC: 1 MG/DL (ref 0.6–1.2)
GFR AFRICAN AMERICAN: >60
GFR AFRICAN AMERICAN: >60
GFR NON-AFRICAN AMERICAN: 53
GFR NON-AFRICAN AMERICAN: 59
GLUCOSE BLD-MCNC: 127 MG/DL (ref 70–99)
GLUCOSE BLD-MCNC: 130 MG/DL (ref 70–99)
GLUCOSE BLD-MCNC: 132 MG/DL (ref 70–99)
GLUCOSE BLD-MCNC: 141 MG/DL (ref 70–99)
GLUCOSE BLD-MCNC: 196 MG/DL (ref 70–99)
HCT VFR BLD CALC: 36.7 % (ref 36–48)
HEMOGLOBIN: 11.9 G/DL (ref 12–16)
MCH RBC QN AUTO: 27.2 PG (ref 26–34)
MCHC RBC AUTO-ENTMCNC: 32.4 G/DL (ref 31–36)
MCV RBC AUTO: 83.7 FL (ref 80–100)
PDW BLD-RTO: 15.3 % (ref 12.4–15.4)
PERFORMED ON: ABNORMAL
PLATELET # BLD: 234 K/UL (ref 135–450)
PMV BLD AUTO: 8.4 FL (ref 5–10.5)
POC ACT LR: 107 SEC
POTASSIUM REFLEX MAGNESIUM: 4 MMOL/L (ref 3.5–5.1)
POTASSIUM REFLEX MAGNESIUM: 4.1 MMOL/L (ref 3.5–5.1)
RBC # BLD: 4.38 M/UL (ref 4–5.2)
SODIUM BLD-SCNC: 141 MMOL/L (ref 136–145)
SODIUM BLD-SCNC: 142 MMOL/L (ref 136–145)
WBC # BLD: 6.3 K/UL (ref 4–11)

## 2022-03-07 PROCEDURE — 85347 COAGULATION TIME ACTIVATED: CPT

## 2022-03-07 PROCEDURE — 93460 R&L HRT ART/VENTRICLE ANGIO: CPT

## 2022-03-07 PROCEDURE — 2580000003 HC RX 258

## 2022-03-07 PROCEDURE — 2500000003 HC RX 250 WO HCPCS

## 2022-03-07 PROCEDURE — 6370000000 HC RX 637 (ALT 250 FOR IP): Performed by: INTERNAL MEDICINE

## 2022-03-07 PROCEDURE — 99232 SBSQ HOSP IP/OBS MODERATE 35: CPT | Performed by: HOSPITALIST

## 2022-03-07 PROCEDURE — B2111ZZ FLUOROSCOPY OF MULTIPLE CORONARY ARTERIES USING LOW OSMOLAR CONTRAST: ICD-10-PCS | Performed by: INTERNAL MEDICINE

## 2022-03-07 PROCEDURE — 99152 MOD SED SAME PHYS/QHP 5/>YRS: CPT | Performed by: INTERNAL MEDICINE

## 2022-03-07 PROCEDURE — 2060000000 HC ICU INTERMEDIATE R&B

## 2022-03-07 PROCEDURE — B2151ZZ FLUOROSCOPY OF LEFT HEART USING LOW OSMOLAR CONTRAST: ICD-10-PCS | Performed by: INTERNAL MEDICINE

## 2022-03-07 PROCEDURE — 6360000002 HC RX W HCPCS

## 2022-03-07 PROCEDURE — C1769 GUIDE WIRE: HCPCS

## 2022-03-07 PROCEDURE — C1887 CATHETER, GUIDING: HCPCS

## 2022-03-07 PROCEDURE — 4A023N8 MEASUREMENT OF CARDIAC SAMPLING AND PRESSURE, BILATERAL, PERCUTANEOUS APPROACH: ICD-10-PCS | Performed by: INTERNAL MEDICINE

## 2022-03-07 PROCEDURE — 2709999900 HC NON-CHARGEABLE SUPPLY

## 2022-03-07 PROCEDURE — 99222 1ST HOSP IP/OBS MODERATE 55: CPT | Performed by: NURSE PRACTITIONER

## 2022-03-07 PROCEDURE — 93460 R&L HRT ART/VENTRICLE ANGIO: CPT | Performed by: INTERNAL MEDICINE

## 2022-03-07 PROCEDURE — 36415 COLL VENOUS BLD VENIPUNCTURE: CPT

## 2022-03-07 PROCEDURE — 80048 BASIC METABOLIC PNL TOTAL CA: CPT

## 2022-03-07 PROCEDURE — C1894 INTRO/SHEATH, NON-LASER: HCPCS

## 2022-03-07 PROCEDURE — 2580000003 HC RX 258: Performed by: INTERNAL MEDICINE

## 2022-03-07 PROCEDURE — 85027 COMPLETE CBC AUTOMATED: CPT

## 2022-03-07 RX ORDER — HEPARIN SODIUM 1000 [USP'U]/ML
INJECTION, SOLUTION INTRAVENOUS; SUBCUTANEOUS
Status: COMPLETED | OUTPATIENT
Start: 2022-03-07 | End: 2022-03-07

## 2022-03-07 RX ORDER — MIDAZOLAM HYDROCHLORIDE 5 MG/ML
INJECTION INTRAMUSCULAR; INTRAVENOUS
Status: COMPLETED | OUTPATIENT
Start: 2022-03-07 | End: 2022-03-07

## 2022-03-07 RX ORDER — ACETAMINOPHEN 325 MG/1
650 TABLET ORAL EVERY 4 HOURS PRN
Status: DISCONTINUED | OUTPATIENT
Start: 2022-03-07 | End: 2022-03-08 | Stop reason: HOSPADM

## 2022-03-07 RX ORDER — SODIUM CHLORIDE 0.9 % (FLUSH) 0.9 %
5-40 SYRINGE (ML) INJECTION EVERY 12 HOURS SCHEDULED
Status: DISCONTINUED | OUTPATIENT
Start: 2022-03-07 | End: 2022-03-07 | Stop reason: SDUPTHER

## 2022-03-07 RX ORDER — SODIUM CHLORIDE 9 MG/ML
25 INJECTION, SOLUTION INTRAVENOUS PRN
Status: DISCONTINUED | OUTPATIENT
Start: 2022-03-07 | End: 2022-03-08 | Stop reason: HOSPADM

## 2022-03-07 RX ORDER — SODIUM CHLORIDE 0.9 % (FLUSH) 0.9 %
5-40 SYRINGE (ML) INJECTION PRN
Status: DISCONTINUED | OUTPATIENT
Start: 2022-03-07 | End: 2022-03-07 | Stop reason: SDUPTHER

## 2022-03-07 RX ORDER — SPIRONOLACTONE 25 MG/1
25 TABLET ORAL DAILY
Status: DISCONTINUED | OUTPATIENT
Start: 2022-03-07 | End: 2022-03-08 | Stop reason: HOSPADM

## 2022-03-07 RX ORDER — FENTANYL CITRATE 50 UG/ML
INJECTION, SOLUTION INTRAMUSCULAR; INTRAVENOUS
Status: COMPLETED | OUTPATIENT
Start: 2022-03-07 | End: 2022-03-07

## 2022-03-07 RX ADMIN — SODIUM CHLORIDE: 9 INJECTION, SOLUTION INTRAVENOUS at 01:58

## 2022-03-07 RX ADMIN — SPIRONOLACTONE 25 MG: 25 TABLET ORAL at 17:19

## 2022-03-07 RX ADMIN — OXYCODONE HYDROCHLORIDE AND ACETAMINOPHEN 500 MG: 500 TABLET ORAL at 20:38

## 2022-03-07 RX ADMIN — SODIUM CHLORIDE, PRESERVATIVE FREE 10 ML: 5 INJECTION INTRAVENOUS at 20:38

## 2022-03-07 RX ADMIN — ASPIRIN 81 MG: 81 TABLET, COATED ORAL at 07:51

## 2022-03-07 RX ADMIN — HEPARIN SODIUM 5000 UNITS: 1000 INJECTION, SOLUTION INTRAVENOUS; SUBCUTANEOUS at 11:42

## 2022-03-07 RX ADMIN — MIDAZOLAM HYDROCHLORIDE 1 MG: 5 INJECTION INTRAMUSCULAR; INTRAVENOUS at 11:39

## 2022-03-07 RX ADMIN — CARVEDILOL 6.25 MG: 6.25 TABLET, FILM COATED ORAL at 17:19

## 2022-03-07 RX ADMIN — FENTANYL CITRATE 25 MCG: 50 INJECTION, SOLUTION INTRAMUSCULAR; INTRAVENOUS at 11:39

## 2022-03-07 ASSESSMENT — PAIN SCALES - GENERAL: PAINLEVEL_OUTOF10: 0

## 2022-03-07 NOTE — CARE COORDINATION
Hospital day 5: Patient on C3 CHF followed by IM, Nephrology, and Cardiology. Plans for LHC this date. Patient to dc to [facility] when medically stable, no COVID testing re recently +, Cert obtained good thru 03/08/2022. Spoke with Patient and two dtr bedside aware of plans and in agreement. HUSAM Kevin

## 2022-03-07 NOTE — PROCEDURES
[id number] stenosis. D1 has a high takeoff and has ostial/proximal 90 to 95% stenosis. Mid-distal 30% stenosis. LCX  Proximal 20 to 30% stenosis, mid 99% 200% subtotal occlusion ()       RCA Dominant, calcified, tortuous, proximal-mid 100% . There are well-developed left to right as well as right to right collaterals         CONCLUSIONS:     Multivessel CAD/ASHD  We will refer to CT surgery for consideration of CABG  Can consider high risk multivessel PCI if not felt to be surgical candidate or per patient preference, although this may be limited due to CTOs and renal insufficiency    Continue medical therapy with aspirin and beta-blocker. No ACE/ARB/ARNI due to renal insufficiency. Reconsider loop diuretics pending clinical follow-up, however, can go ahead and restart spirinolactone. No statin due to allergy/intolerance. Findings/plans discussed with patient/family, answered questions to their satisfaction. They understand that can also consider limited, conservative measures with medical therapy alone although her prognosis would be guarded given severe CAD/ASHD. There is a possibility of disease progression/clinical deterioration either with or without aggressive measures/treatments. D/w them obtaining 2nd opinion, they are not interested in that. Addendum:    F/u conversation/discussion w/ pt and family, they are leaning toward higher risk staged outpt MV PCI. They understand r/b/a/o and will let us know w/ regard to final decision. No further inpt cardiac workup or treatment is planned. Will sign off, please call with questions.

## 2022-03-07 NOTE — PROGRESS NOTES
Brief Pre-Op Note/Sedation Assessment      Amrik Cooney  1935  [id number]  11:32 AM    Planned Procedure: Cardiac Catheterization Procedure  Post Procedure Plan: Return to same level of care  Consent: I have discussed with the patient and/or the patient representative the indication, alternatives, and the possible risks and/or complications of the planned procedure and the anesthesia methods. The patient and/or patient representative appear to understand and agree to proceed. DISCUSSION OF CARDIAC CATHETERIZATION PROCEDURES: The procedures, indications, risks and alternatives have been discussed with the patient and, as appropriate, with the patient's guardian . Risks discussed included, but are not limited to, bleeding, development of blood clots/emboli, damage to blood vessels, renal failure, malignant cardiac arrhythmias, stroke, heart attack, emergent coronary bypass surgery, death, dye allergy. The patient (and guardian as appropriate) expressed understanding of the aforementioned and wished to proceed. Chief Complaint:   Anginal Equivalent  Dyspnea      Indications for Cath Procedure:  1. Presentation:  Cardiomyopathy  2. Anginal Classification within 2 weeks:  CCS IV - Inability to perform any activity without angina or angina at rest, i.e., severe limitation  3. Angina Symptoms Assessment:  Atypical Chest Pain  4. Heart Failure Class within last 2 weeks:  Yes:  Heart Failure Type: Systolic Severity:  Class IV - Symptoms of HF at rest  5. Cardiovascular Instability:  No    Prior Ischemic Workup/Eval:  1. Pre-Procedural Medications: Yes: Beta Blockers  2. Stress Test Completed? Yes:  Stress or Imaging Studies Performed (within ANY time period):   Type:  Stress Nuclear  Results:  Positive:  Myocardial Perfusion Defects (Nuclear) Extent of Ischemia:  High Risk (>3% annual death or MI)    Does Patient need surgery?   Cath Valve Surgery:  No    Pre-Procedure Medical History:  Vital Signs:  BP (!) 144/89   Pulse 82   Temp 97.8 °F (36.6 °C) (Oral)   Resp 20   Wt 196 lb 9.6 oz (89.2 kg)   SpO2 92%   BMI 34.83 kg/m²     Allergies: Allergies   Allergen Reactions    Pcn [Penicillins]     Statins Other (See Comments)     Leg pains    Ibuprofen Rash     Tolerates low dose enteric-coated ASA.      Medications:    Current Facility-Administered Medications   Medication Dose Route Frequency Provider Last Rate Last Admin    insulin lispro (HUMALOG) injection vial 0-12 Units  0-12 Units SubCUTAneous TID  Shad Pinto MD   2 Units at 03/06/22 1631    0.9 % sodium chloride infusion   IntraVENous Continuous Tad Ricks MD 50 mL/hr at 03/07/22 0434 Rate Verify at 03/07/22 0434    sodium chloride flush 0.9 % injection 5-40 mL  5-40 mL IntraVENous PRN Jaylene Braun, APRN - CNP        0.9 % sodium chloride infusion  25 mL IntraVENous PRN Jaylene Aparna, APRLINWOOD - CNP        0.9 % sodium chloride infusion  25 mL IntraVENous PRN Catalina Culp MD        sodium chloride flush 0.9 % injection 5-40 mL  5-40 mL IntraVENous 2 times per day Catalina Culp MD   10 mL at 03/06/22 2021    sodium chloride flush 0.9 % injection 5-40 mL  5-40 mL IntraVENous PRN Catalina Culp MD        0.9 % sodium chloride infusion  25 mL IntraVENous PRN Tad Ricks MD        magnesium sulfate 1000 mg in dextrose 5% 100 mL IVPB  1,000 mg IntraVENous PRN Tad Ricks MD        ondansetron (ZOFRAN-ODT) disintegrating tablet 4 mg  4 mg Oral Q8H PRN Tad Ricks MD        Or    ondansetron [facility]) injection 4 mg  4 mg IntraVENous Q6H PRN Tad Ricks MD        polyethylene glycol [facility]) packet 17 g  17 g Oral Daily Tad Ricks MD   17 g at 03/05/22 0934    potassium chloride (KLOR-CON M) extended release tablet 40 mEq  40 mEq Oral PRN Tad Ricks MD        Or    potassium bicarb-citric acid (EFFER-K) effervescent tablet 40 mEq  40 mEq Oral PRN Tad Ricks MD Or    potassium chloride 10 mEq/100 mL IVPB (Peripheral Line)  10 mEq IntraVENous PRN Gunnar Villa MD        sodium chloride flush 0.9 % injection 10 mL  10 mL IntraVENous PRN Gunnar Villa MD        ascorbic acid (VITAMIN C) tablet 500 mg  500 mg Oral BID Gunnar Villa MD   500 mg at 03/06/22 2140    aspirin EC tablet 81 mg  81 mg Oral Daily Gunnar Villa MD   81 mg at 03/07/22 0751    bisacodyl (DULCOLAX) suppository 10 mg  10 mg Rectal Daily PRN Gunnar Villa MD        carvedilol (COREG) tablet 6.25 mg  6.25 mg Oral BID WC Gunnar Villa MD   6.25 mg at 03/06/22 1642    dextrose bolus (hypoglycemia) 10% 125 mL  125 mL IntraVENous PRN Gunnar Villa MD        Or    dextrose bolus (hypoglycemia) 10% 250 mL  250 mL IntraVENous PRN Gunnar Villa MD        docusate sodium (COLACE) capsule 100 mg  100 mg Oral Daily Gunnar Villa MD   100 mg at 03/06/22 8859    Vitamin D (CHOLECALCIFEROL) tablet 2,000 Units  2,000 Units Oral Daily Gunnar Villa MD   2,000 Units at 03/06/22 6351    allopurinol (ZYLOPRIM) tablet 100 mg  100 mg Oral Daily Carrington Owen MD   100 mg at 03/06/22 0937    glucose (GLUTOSE) 40 % oral gel 15 g  15 g Oral PRN Carrington Owen MD        glucagon (rDNA) injection 1 mg  1 mg IntraMUSCular PRN Carrington Owen MD        dextrose 5 % solution  100 mL/hr IntraVENous PRN Carrington Owen MD        sodium chloride flush 0.9 % injection 10 mL  10 mL IntraVENous SARAHN Carrington Owen MD        0.9 % sodium chloride infusion  25 mL IntraVENous PRN Carrington Owen MD        magnesium sulfate 2000 mg in 50 mL IVPB premix  2,000 mg IntraVENous PRN Carrington Owen MD        senna (SENOKOT) tablet 8.6 mg  1 tablet Oral Daily PRN Carrington Owen MD        acetaminophen (TYLENOL) tablet 650 mg  650 mg Oral Q6H PRN Carrington Owen MD        Or   [employer] acetaminophen (TYLENOL) suppository 650 mg  650 mg Rectal Q6H PRN Carrington Owen MD        ondansetron (ZOFRAN-ODT) disintegrating analgesia and maximize the potential amnesia. Patient to meet pre-procedure discharge plan.     Medication Planned:  midazolam intravenously and fentanyl intravenously    Patient is an appropriate candidate for plan of sedation:   yes      Electronically signed by Marquis Florence MD on 3/7/2022 at 11:32 AM

## 2022-03-07 NOTE — CONSULTS
Department of Cardiovascular & Thoracic Surgery  History and Physical        DIAGNOSIS: Multivessel CAD     CHIEF COMPLAINT: Shortness of breath      History Obtained From:  patient, electronic medical record    HISTORY OF PRESENT ILLNESS:      The patient is a 81 y.o. female former smoker with significant past medical history of HTN, HLD, CMP, systolic HF and E3RR who presented to [facility] ED on 2/23. She had gone for a scheduled outpatient echo and while there developed difficulty breathing and a rapid response was called. She was subsequently taken to the ED where she was tachypneic, hypoxic and acidotic. She was immediately placed on BiPap and was admitted for further evaluation and treatment. Of note she was also found to be COVID positive. During her work up an echocardiogram was performed and revealed a reduced EF of 35% and Cardiology was consulted. She was newly diagnosed with an undefined cardiomyopathy. Ischemic evaluation included a yomaira myoview stress test with abnormal results. She was most recently transferred over to [facility] so that a cardiac catheterization could be performed. Her cath was done on 3/7 and revealed multivessel CAD. We have been consulted for possible surgical revascularization.      Past Medical History:        Diagnosis Date    Hypertension     Mixed hyperlipidemia     Hyperlipidemia    Type II or unspecified type diabetes mellitus without mention of complication, not stated as uncontrolled        Past Surgical History:        Procedure Laterality Date    KNEE SURGERY Right 11/22/2016    LIPOMA RESECTION         Medications Prior to Admission:   Medications Prior to Admission: amLODIPine (NORVASC) 5 MG tablet, Take 5 mg by mouth daily  lisinopril-hydroCHLOROthiazide (PRINZIDE;ZESTORETIC) 10-12.5 MG per tablet, Take 1 tablet by mouth 2 times daily  allopurinol (ZYLOPRIM) 100 MG tablet, Take 100 mg by mouth daily  furosemide (LASIX) 20 MG tablet, Take 1 tablet by mouth every other day  ACCU-CHEK JERRY PLUS strip, USE TO TEST BLOOD SUGAR ONCE DAILY  [facility] [place], 11/9/21-11/8/26  metFORMIN (GLUCOPHAGE) 500 MG tablet, TAKE 1 TABLET EVERY DAY  Multiple Vitamins-Minerals (CENTRUM ADULTS PO), Take by mouth  Glucose Blood (BLOOD GLUCOSE TEST STRIPS) STRP, 1 strip by Does not apply route three times daily Patient requesting True Results brand  glucose monitoring kit (FREESTYLE) monitoring kit, 1 kit by Does not apply route daily as needed (not prn) Dx E11.9-uses once daily-needs Acucheck Jerry Plus  aspirin 81 MG EC tablet, Take 81 mg by mouth daily. Indications: OTC    Allergies:  Pcn [penicillins], Statins, and Ibuprofen    Social History:    TOBACCO:   reports that she quit smoking about 38 years ago. She has never used smokeless tobacco.  ETOH:   reports no history of alcohol use. CAFFEINE ABUSE:  No  DRUGS:   reports no history of drug use. LIFESTYLE: somewhat sedentary    MARITAL STATUS:    OCCUPATION:  Retired     Family History:    History reviewed. No pertinent family history. REVIEW OF SYSTEMS:      Constitutional:  No night sweats, headaches, weight loss. Eyes:  No glaucoma, cataracts. ENMT:  No nosebleeds, deviated septum. +[place]  Cardiac:  No arrhythmias, chest pain. Vascular:  No claudication, varicosities. GI:  No PUD, heartburn. :  No kidney stones, frequent UTIs  Musculoskeletal:  No arthritis, gout. Respiratory:  No emphysema, asthma. +SOB, +BOWIE  Integumentary:  No dermatitis, itching, rash. Neurological:  No stroke, TIAs, seizures. Psychiatric:  No depression, anxiety. Endocrine: No thyroid issues. +diabetes  Hematologic:  No bleeding, easy bruising. Immunologic:  No known cancer, steroid therapies. PHYSICAL EXAM:    VITALS:  BP (!) 144/89   Pulse 82   Temp 97.8 °F (36.6 °C) (Oral)   Resp 20   Wt 196 lb 9.6 oz (89.2 kg)   SpO2 92%   BMI 34.83 kg/m²     Constitutional:   Well developed and nourished female. No acute distress. + obesity. Eyes:  lids and lashes normal, pupils equal, round and reactive to light, extra ocular muscles intact, sclera clear, conjunctiva normal    Head/ENT:  normal teeth, gums, & palate. Moist mucus membranes. No cyanosis or pallor. Neck:  supple, symmetrical, trachea midline, no lymphadenopathy, no jugular venous distension, no carotid bruits and MASSES:  no masses. Lungs:  no increased work of breathing, good air exchange, no retractions and clear to auscultation, no crackles or wheezing. Cardiovascular:  regular rate and rhythm, S1, S2 normal, no murmur, click, rub or gallop. Apical impulse in 5th intercostal space. Pulses:  Right dorsalis pedis 2, Left dorsalis pedis 2, Right posterior tibial 1, Left Posterior tibial 1, Right radial 2, and Left radial 2. Abdomen:  normal bowel sounds, non-tender, unable to appreciate aorta 2/2 body habitus. No hepatosplenomegaly or masses. Musculoskeletal:  Back is straight and non-tender, full ROM of upper and lower extremities. No kyphosis or scoliosis. Extremities:  Warm, pink, no clubbing, cyanosis, petechiae, ischemia, or deformities. No peripheral edema. Skin: no rashes, no petechiae, no nodules, no jaundice, no purpura, Scattered ecchymotic areas on BUE's, a few scabs noted to her LLE.      Neurological/Psychiatric: oriented, normal mood, CN II-XII intact    DATA:  EK22 Sinus rhythm with occasional Premature ventricular complexesNon-specific TW changesBorderlineAs compared to previous EKGTW changes have worsenedConfirmed by Jeremiah Conti ([id number]) on 2022 3:22:27 PM      EKG: 3/3/22 Normal sinus rhythm Left posterior fascicular block Possible Inferior infarct, age undetermined Possible Anterior infarct, age undetermined Abnormal ECG When compared with ECG of 2022 08:16,Premature ventricular complexes are no longer Present Left posterior fascicular block is now Present Borderline criteria for Inferior infarct are now Present Confirmed by Rosemarie Duckworth ([id number]) on 3/3/2022 2:31:50 PM      CBC with Differential:    Lab Results   Component Value Date    WBC 6.3 03/07/2022    RBC 4.38 03/07/2022    HGB 11.9 03/07/2022    HCT 36.7 03/07/2022     03/07/2022    MCV 83.7 03/07/2022    MCH 27.2 03/07/2022    MCHC 32.4 03/07/2022    RDW 15.3 03/07/2022    SEGSPCT 68.6 05/08/2013    LYMPHOPCT 18.7 03/03/2022    MONOPCT 10.8 03/03/2022    EOSPCT 2.5 05/03/2012    BASOPCT 0.9 03/03/2022    MONOSABS 0.8 03/03/2022    LYMPHSABS 1.3 03/03/2022    EOSABS 0.4 03/03/2022    BASOSABS 0.1 03/03/2022    DIFFTYPE Auto-K 05/08/2013     CMP:    Lab Results   Component Value Date     03/07/2022    K 4.1 03/07/2022     03/07/2022    CO2 25 03/07/2022    BUN 26 03/07/2022    CREATININE 0.9 03/07/2022    GFRAA >60 03/07/2022    GFRAA >60 05/08/2013    AGRATIO 1.7 03/03/2022    LABGLOM 59 03/07/2022    GLUCOSE 141 03/07/2022    PROT 5.4 03/03/2022    PROT 7.2 05/03/2012    LABALBU 3.4 03/03/2022    CALCIUM 10.1 03/07/2022    BILITOT 0.6 03/03/2022    ALKPHOS 81 03/03/2022    AST 19 03/03/2022    ALT 33 03/03/2022     Hepatic Function Panel:    Lab Results   Component Value Date    ALKPHOS 81 03/03/2022    ALT 33 03/03/2022    AST 19 03/03/2022    PROT 5.4 03/03/2022    PROT 7.2 05/03/2012    BILITOT 0.6 03/03/2022    BILIDIR 0.12 10/17/2013    IBILI 0.3 10/17/2013    LABALBU 3.4 03/03/2022     Magnesium:    Lab Results   Component Value Date    MG 2.00 02/28/2022     Last 3 Troponin:    Lab Results   Component Value Date    TROPONINI <0.01 02/23/2022     CXR: 2/23/22   Impression   Findings most consistent with CHF with vascular congestion and interstitial   edema. ECHO: 2/23/22    Findings:    Left Ventricle   LV systolic function is moderately reduced with ejection fraction estimated   at 35 %. There is mild global hypokinesis. Left ventricle size is normal.   There is mild concentric left ventricular hypertrophy.    Elevated left ventricular diastolic filling pressure. Mitral Valve   Mild posterior mitral annular calcification is present. The mitral valve leaflets are normal in appearance and mobility. No evidence of mitral valve stenosis. Moderate mitral regurgitation. Left Atrium   The left atrium is normal in size. Aortic Valve   Aortic valve appears sclerotic but opens adequately. The aortic valve appears tricuspid . No evidence of aortic valve stenosis. Mild aortic regurgitation is present. Aorta   The aortic root is normal in size. Right Ventricle   The right ventricle is normal in size and function. Tricuspid Valve   Tricuspid valve is structurally normal.   Tricuspid valve leaflet mobility is normal.   Mild tricuspid regurgitation. Systolic pulmonary artery pressure (SPAP) estimated at 76 mmHg (RA pressure   8 mmHg), consistent with severe pulmonary hypertension. Right Atrium   The right atrium is mildly dilated. Pulmonic Valve   The pulmonic valve is normal in structure. No evidence of pulmonic valve stenosis. Mild pulmonic regurgitation present. Pericardial Effusion   There is a small circumferential pericardial effusion noted. Pleural Effusion   No pleural effusion. NM Cardiac Stress Test: 3/01/22   Summary    Moderate sized inferolateral and lateral partial reversibility defects    consistent with ischemia and infarction in the territory of the mid LCx    and/or RCA. LV function is moderately reduced with global hypokinesis and    ejection fraction of 32%. HIgher risk abnormal study.        CT Chest: 2/23/22   FINDINGS:   Pulmonary Arteries: Pulmonary arteries are adequately opacified for   evaluation.  No evidence of intraluminal filling defect to suggest pulmonary   embolism.  Main pulmonary artery is normal in caliber.       Mediastinum: No evidence of mediastinal lymphadenopathy.  The heart and   pericardium demonstrate no acute abnormality.  There is no acute abnormality   of the thoracic aorta.       Lungs/pleura: Bilateral ground-glass opacities, right greater than left, with   bilateral lower lobe nodular consolidations, highly suggestive of viral   pneumonia as seen with COVID-19.  Trace bilateral pleural effusions.       Upper Abdomen: Limited images of the upper abdomen are unremarkable.       Soft Tissues/Bones:  Age related degenerative changes of the visualized   osseous structures without focal destructive lesion.  Visualized soft tissues   are unremarkable. LHC: 3/7/22 with Dr. Gómez Dietrich      HEMODYNAMICS     CHAMBER PRESSURE SATURATION   RA  4  65%   RV  34/5     PA  38/11  58%   PW  10     AORTA  125/62  90%      HARRIET CARDIAC OUTPUT  6.3 L/min   SVR  1117    PVR  190      Left heart catheterization  LVEDP  11   GRADIENT ACROSS AORTIC VALVE  less than 10 mmHg      Aorta is noted to be calcified     CORONARY ARTERIES  LM Less than 10% acnipahw-kdm-mjkrjx stenosis            LAD  20 to 30% hargrzin-ieg-yqhrbk stenosis.     D1 has a high takeoff and has ostial/proximal 90 to 95% stenosis. Mid-distal 30% stenosis.       LCX  Proximal 20 to 30% stenosis, mid 99% 200% subtotal occlusion ()         RCA Dominant, calcified, tortuous, proximal-mid 100% . There are well-developed left to right as well as right to right collaterals      [id number] Score for Atrial Fibrillation Stroke Risk   Risk   Factors  Component Value   C CHF Yes 1   H HTN Yes 1   A2 Age >= 76 Yes,  (81 y.o.) 2   D DM Yes 1   S2 Prior Stroke/TIA No 0   V Vascular Disease No 0   A Age 74-69 No,  (81 y.o.) 0   Sc Sex female 1    [id number]  Score  6   Score last updated 3/7/22 0:05 PM EST    Click here for a link to the UpToDate guideline \"Atrial Fibrillation: Anticoagulation therapy to prevent embolization    Disclaimer: Risk Score calculation is dependent on accuracy of patient problem list and past encounter diagnosis.        ASSESSMENT AND PLAN:  STS Cardiac Surgery Risk profile: CABG     Mortality:  3.25%  Renal Failure:  2.33%  Permanent Stroke:  2.05%  Prolonged Ventilation:  14.58%  Deep Sternal Infection:  0.24%  Reoperation:  1.84%  Morbidity and Mortality:  19.52%  Short LOS:  15.35%  Long LOS:  9.91%    Pt is an 81 y.o. female that, prior to this recent hospital stay, has been independent. Her daughter reports that she still drives. The patient is strongly opposed to surgery and has decided to go with staged PCI planned by Cardiology. All questions have been answered. We will sign off at this time. Thank you for the consult.         POOL Rivas CNP  3/7/2022  1:31 PM

## 2022-03-07 NOTE — PROGRESS NOTES
Hospitalist Progress Note    Date of Admission: 3/2/2022    Chief Complaint: SOB    Hospital Course: Erica Stevens is a 81 y.o. female who was transferred from [facility] to [facility] for further evaluation and treatment by invasive cardiology. The patient was recently hospitalized in the ICU following COVID-19 diagnosis and subsequent ischemic cardiomyopathy with heart failure. Stress test was performed and noted to be abnormal therefore it was recommended that she be transferred to this facility for further intervention. Cardiac risk factors include: HTN, HLD, CMP, type II DM, and former smoker. Review of epic chart reveals the patient was experiencing worsening dyspnea with respiratory acidosis upon presentation to OSH therefore she expanded to the ICU and placed on BiPAP therapy. Echo was obtained revealing new onset systolic dysfunction in addition to severe PAH. Patient was diagnosed with SARS-CoV-2 during her hospital staY, despite being fully vaccinated and s/p booster x1. Notable labs prior to transfer include: JANAE with BUN 50/1.5 CrCl 29 cc/min, hyperglycemia 151, BNP 9052, and marked hyperlipidemia with statin intolerance. Subjective: LHC completed; found to have multivessel disease. No SOB or CP. Labs:   No results for input(s): WBC, HGB, HCT, PLT in the last 72 hours. Recent Labs     03/05/22  0603 03/06/22  0621 03/07/22  0530    139 142   K 4.4 3.8 4.0    106 108   CO2 26 23 23   BUN 38* 33* 26*   CREATININE 1.2 1.1 1.0   CALCIUM 10.3 9.5 9.5     No results for input(s): AST, ALT, BILIDIR, BILITOT, ALKPHOS in the last 72 hours. No results for input(s): INR in the last 72 hours. Physical Exam Performed:    BP (!) 144/89   Pulse 82   Temp 97.8 °F (36.6 °C) (Oral)   Resp 20   Wt 196 lb 9.6 oz (89.2 kg)   SpO2 92%   BMI 34.83 kg/m²     General appearance:  No apparent distress, appears stated age and cooperative.   HEENT:  Pupils equal, round, and reactive to light. Conjunctivae/corneas clear. Neck:  Supple, no jugular venous distention. Trachea midline with full range of motion. Respiratory:  Normal respiratory effort. Diminished but clear to auscultation, bilaterally without Rales/Wheezes/Rhonchi. Cardiovascular:  Regular rate and rhythm with normal S1/S2 without murmurs, rubs or gallops. Abdomen:  Soft, non-tender, non-distended with normal bowel sounds. Musculoskelatal:  No clubbing, cyanosis or edema bilaterally. Full range of motion without deformity. Neurologic:  Neurovascularly intact without any focal sensory/motor deficits. Cranial nerves: II-XII intact, grossly non-focal.  Psychiatric:  Alert and oriented, thought content appropriate, normal insight  Skin:  Skin color, texture, turgor normal.  No rashes or lesions. Capillary Refill:  Brisk,< 3 seconds   Peripheral Pulses:  +2 palpable, equal bilaterally       Assessment/Plan:    Active Hospital Problems    Diagnosis     Ischemic cardiomyopathy [I25.5]     JANAE [facility]acute kidney [facility]) ([facility] [address] [N17.9]     Abnormal stress test [R94.39]     Primary hypertension [I10]     Acute systolic CHF (congestive heart failure) ([facility]) [I50.21]     COVID-19 [U07.1]     Mixed hyperlipidemia [E78.2]     Diabetes mellitus ([facility] [address] [E11.9]      Abnormal nuclear GXT  -Continue ACS a 81 mg daily   -HLD untreated due to intolerance to multiple statins  -Cardiology following  --C completed; found to have multivessel disease. --CT Surgery consulted. Patient adamantly opposed to surgery and would prefer high risk PCI. --May need to allow for renal stability after initial contrast exposure before PCI, possibly coming back as outpatient. Acute on chronic systolic CHF  -Continue current regimen with Coreg, Demadex, Aldactone, and Entresto  -ECHO noted  --Likely ischemic cardiomyopathy based on [facility] findings. JANAE/CKD: Improving/stable. Monitor after [facility].      Type II DM  -POC glucose every 4 hours while NPO, with medium dose Humalog SSI    COVID-19 in former smoker  -Continuous pulse oximetry monitoring initiated with PRN supplemental O2   -Encourage aggressive pulmonary toilet including incentive spirometry every 4H while awake  -Combivent scheduled Q4H while awake  -Positive test was 2/23/22, however, she presented to PCP on 2/22/22 with complaint of worsening SOB, so suspect symptom onset even sooner. Has completed > 10 days of appropriate Isolation and Droplet Plus precautions were discontinued.      DVT Prophylaxis: Lovenox  Diet: Diet NPO Exceptions are: Sips of Water with Meds  Code Status: Full Code  PT/OT Eval Status: Pending course     Dispo - Plan for [facility] 1-2 days pending Cardiology plans    Carlo Min MD

## 2022-03-07 NOTE — PROGRESS NOTES
Nephrology Progress  Note                                                                                                                                                                                                                                                                                                                                                               Office : [phone]     Fax :[phone]              Patient's Name: Jason Mims  11:11 AM  3/7/2022    Reason for Consult: JANAE      Requesting Physician:  Jayme Simon DO      Chief Complaint:  CHF    History of Present Ilness:    Jason Mims is a 81 y.o. female with PMH of HTN , CMP , DM , HTN    Transferred from [facility] for cardiology eval for ischemic cardiomyopathy    Stress test was performed and noted to be abnormal therefore it was recommended that she be transferred to this facility for further intervention. Nephrology consult for JANAE management before cardiac cath.     Patient got IVF NS overnight ,serum cr trending down and close to baseline now    Denies chest pain or diarrhea or vomiting   Not in acute distress       Interval History :      Getting IVF NS  Not in acute distress  No diarrhea or vomiting     Lasix held    Plan for LHC today    Creatinine improved to  baseline        Past Medical History:   Diagnosis Date    Hypertension     Mixed hyperlipidemia     Hyperlipidemia    Type II or unspecified type diabetes mellitus without mention of complication, not stated as uncontrolled        Past Surgical History:   Procedure Laterality Date    KNEE SURGERY Right 11/22/2016    LIPOMA RESECTION         Current Medications:    insulin lispro (HUMALOG) injection vial 0-12 Units, TID WC  0.9 % sodium chloride infusion, Continuous  sodium chloride flush 0.9 % injection 5-40 mL, PRN  0.9 % sodium chloride infusion, PRN  0.9 % sodium chloride infusion, PRN  sodium chloride flush 0.9 % injection 5-40 mL, 2 times per day  sodium chloride flush 0.9 % injection 5-40 mL, PRN  0.9 % sodium chloride infusion, PRN  magnesium sulfate 1000 mg in dextrose 5% 100 mL IVPB, PRN  ondansetron (ZOFRAN-ODT) disintegrating tablet 4 mg, Q8H PRN   Or  ondansetron (ZOFRAN) injection 4 mg, Q6H PRN  polyethylene glycol (GLYCOLAX) packet 17 g, Daily  potassium chloride (KLOR-CON M) extended release tablet 40 mEq, PRN   Or  potassium bicarb-citric acid (EFFER-K) effervescent tablet 40 mEq, PRN   Or  potassium chloride 10 mEq/100 mL IVPB (Peripheral Line), PRN  sodium chloride flush 0.9 % injection 10 mL, PRN  ascorbic acid (VITAMIN C) tablet 500 mg, BID  aspirin EC tablet 81 mg, Daily  bisacodyl (DULCOLAX) suppository 10 mg, Daily PRN  carvedilol (COREG) tablet 6.25 mg, BID WC  dextrose bolus (hypoglycemia) 10% 125 mL, PRN   Or  dextrose bolus (hypoglycemia) 10% 250 mL, PRN  docusate sodium (COLACE) capsule 100 mg, Daily  Vitamin D (CHOLECALCIFEROL) tablet 2,000 Units, Daily  allopurinol (ZYLOPRIM) tablet 100 mg, Daily  glucose (GLUTOSE) 40 % oral gel 15 g, PRN  glucagon (rDNA) injection 1 mg, PRN  dextrose 5 % solution, PRN  sodium chloride flush 0.9 % injection 10 mL, PRN  0.9 % sodium chloride infusion, PRN  magnesium sulfate 2000 mg in 50 mL IVPB premix, PRN  senna (SENOKOT) tablet 8.6 mg, Daily PRN  acetaminophen (TYLENOL) tablet 650 mg, Q6H PRN   Or  acetaminophen (TYLENOL) suppository 650 mg, Q6H PRN  ondansetron (ZOFRAN-ODT) disintegrating tablet 4 mg, Q8H PRN   Or  ondansetron (ZOFRAN) injection 4 mg, Q6H PRN  enoxaparin (LOVENOX) injection 30 mg, Daily  sodium chloride flush 0.9 % injection 5-40 mL, PRN  0.9 % sodium chloride infusion, PRN  albuterol sulfate  (90 Base) MCG/ACT inhaler 2 puff, Q4H PRN   And  ipratropium (ATROVENT HFA) 17 MCG/ACT inhaler 2 puff, Q4H PRN        Physical exam:     Vitals:  BP (!) 144/89   Pulse 82   Temp 97.8 °F (36.6 °C) (Oral)   Resp 20   Wt 196 lb 9.6 oz (89.2 kg) SpO2 92%   BMI 34.83 kg/m²   Constitutional:  OAA X3 NAD  Skin: no rash, turgor wnl  Heent:  eomi, mmm  Neck: no bruits or jvd noted  Cardiovascular:  S1, S2 without m/r/g  Respiratory: CTA B without w/r/r  Abdomen:  +bs, soft, nt, nd  Ext: no  lower extremity edema    Data:   Labs:  CBC:   Recent Labs     03/07/22  0942   WBC 6.3   HGB 11.9*        BMP:    Recent Labs     03/06/22  0621 03/07/22  0530 03/07/22  0942    142 141   K 3.8 4.0 4.1    108 105   CO2 23 23 25   BUN 33* 26* 26*   CREATININE 1.1 1.0 0.9   GLUCOSE 137* 132* 141*     Ca/Mg/Phos:   Recent Labs     03/06/22 0621 03/07/22  0530 03/07/22  0942   CALCIUM 9.5 9.5 10.1     Hepatic:   No results for input(s): AST, ALT, ALB, BILITOT, ALKPHOS in the last 72 hours. Troponin: No results for input(s): TROPONINI in the last 72 hours. BNP: No results for input(s): BNP in the last 72 hours. Lipids: No results for input(s): CHOL, TRIG, HDL, LDLCALC, LABVLDL in the last 72 hours. ABGs: No results for input(s): PHART, PO2ART, GSH4BOK in the last 72 hours. INR: No results for input(s): INR in the last 72 hours. UA:No results for input(s): Chanda See, GLUCOSEU, BILIRUBINUR, Brittnee Vail, BLOODU, PHUR, PROTEINU, UROBILINOGEN, NITRU, LEUKOCYTESUR, LABMICR, URINETYPE in the last 72 hours. Urine Microscopic: No results for input(s): LABCAST, BACTERIA, COMU, HYALCAST, WBCUA, RBCUA, EPIU in the last 72 hours. Urine Culture: No results for input(s): LABURIN in the last 72 hours. Urine Chemistry:   No results for input(s): Dorice Maid, PROTEINUR, NAUR in the last 72 hours. IMAGING:  No orders to display       Assessment/Plan     1.   Acute renal failure     Creat almost at baseline     Baseline serum cr ~ 1      2 CHF , acute       3 COIVD 19 infection     4 Ischemic cardiomyopathy    5 DLP    6 HTN        Moniter renal fn post cath      Risk of post PCI DIANN per Crispin risk score ~ 20 %    Continue gentle fluids - NS Use least possible amount of contrast     Hold diuretics     Hold ACEI or ARB    Avoid NSAIDs    Keep SBP > 120 mmhg                Thank you for allowing us to participate in care of Shannon Gray MD  Feel free to contact me   [facility] [address]  Office : [phone]  Fax :[phone]

## 2022-03-08 ENCOUNTER — TELEPHONE (OUTPATIENT)
Dept: CARDIOLOGY CLINIC | Age: 87
End: 2022-03-08

## 2022-03-08 VITALS
SYSTOLIC BLOOD PRESSURE: 112 MMHG | DIASTOLIC BLOOD PRESSURE: 62 MMHG | BODY MASS INDEX: 34.45 KG/M2 | TEMPERATURE: 98.4 F | OXYGEN SATURATION: 95 % | HEART RATE: 75 BPM | WEIGHT: 194.5 LBS | RESPIRATION RATE: 20 BRPM

## 2022-03-08 LAB
ANION GAP SERPL CALCULATED.3IONS-SCNC: 10 MMOL/L (ref 3–16)
BUN BLDV-MCNC: 21 MG/DL (ref 7–20)
CALCIUM SERPL-MCNC: 9.7 MG/DL (ref 8.3–10.6)
CHLORIDE BLD-SCNC: 106 MMOL/L (ref 99–110)
CO2: 23 MMOL/L (ref 21–32)
CREAT SERPL-MCNC: 0.9 MG/DL (ref 0.6–1.2)
GFR AFRICAN AMERICAN: >60
GFR NON-AFRICAN AMERICAN: 59
GLUCOSE BLD-MCNC: 109 MG/DL (ref 70–99)
GLUCOSE BLD-MCNC: 115 MG/DL (ref 70–99)
GLUCOSE BLD-MCNC: 123 MG/DL (ref 70–99)
GLUCOSE BLD-MCNC: 193 MG/DL (ref 70–99)
HCT VFR BLD CALC: 35.6 % (ref 36–48)
HEMOGLOBIN: 11.5 G/DL (ref 12–16)
MCH RBC QN AUTO: 27 PG (ref 26–34)
MCHC RBC AUTO-ENTMCNC: 32.4 G/DL (ref 31–36)
MCV RBC AUTO: 83.4 FL (ref 80–100)
PDW BLD-RTO: 14.9 % (ref 12.4–15.4)
PERFORMED ON: ABNORMAL
PLATELET # BLD: 242 K/UL (ref 135–450)
PMV BLD AUTO: 8.6 FL (ref 5–10.5)
POTASSIUM REFLEX MAGNESIUM: 4.1 MMOL/L (ref 3.5–5.1)
RBC # BLD: 4.27 M/UL (ref 4–5.2)
SODIUM BLD-SCNC: 139 MMOL/L (ref 136–145)
WBC # BLD: 6.7 K/UL (ref 4–11)

## 2022-03-08 PROCEDURE — 85027 COMPLETE CBC AUTOMATED: CPT

## 2022-03-08 PROCEDURE — 2580000003 HC RX 258: Performed by: INTERNAL MEDICINE

## 2022-03-08 PROCEDURE — 99233 SBSQ HOSP IP/OBS HIGH 50: CPT | Performed by: NURSE PRACTITIONER

## 2022-03-08 PROCEDURE — 97116 GAIT TRAINING THERAPY: CPT

## 2022-03-08 PROCEDURE — 36415 COLL VENOUS BLD VENIPUNCTURE: CPT

## 2022-03-08 PROCEDURE — 2700000000 HC OXYGEN THERAPY PER DAY

## 2022-03-08 PROCEDURE — 94761 N-INVAS EAR/PLS OXIMETRY MLT: CPT

## 2022-03-08 PROCEDURE — 6360000002 HC RX W HCPCS: Performed by: HOSPITALIST

## 2022-03-08 PROCEDURE — 99232 SBSQ HOSP IP/OBS MODERATE 35: CPT | Performed by: HOSPITALIST

## 2022-03-08 PROCEDURE — 6370000000 HC RX 637 (ALT 250 FOR IP): Performed by: INTERNAL MEDICINE

## 2022-03-08 PROCEDURE — 97535 SELF CARE MNGMENT TRAINING: CPT

## 2022-03-08 PROCEDURE — 6370000000 HC RX 637 (ALT 250 FOR IP): Performed by: HOSPITALIST

## 2022-03-08 PROCEDURE — 97110 THERAPEUTIC EXERCISES: CPT

## 2022-03-08 PROCEDURE — 97530 THERAPEUTIC ACTIVITIES: CPT

## 2022-03-08 PROCEDURE — 80048 BASIC METABOLIC PNL TOTAL CA: CPT

## 2022-03-08 RX ORDER — ASPIRIN 81 MG/1
81 TABLET ORAL DAILY
Qty: 30 TABLET | Refills: 3 | Status: SHIPPED | OUTPATIENT
Start: 2022-03-09

## 2022-03-08 RX ORDER — ATORVASTATIN CALCIUM 40 MG/1
40 TABLET, FILM COATED ORAL DAILY
Qty: 30 TABLET | Refills: 3 | Status: SHIPPED | OUTPATIENT
Start: 2022-03-09

## 2022-03-08 RX ORDER — CARVEDILOL 6.25 MG/1
6.25 TABLET ORAL 2 TIMES DAILY WITH MEALS
Qty: 60 TABLET | Refills: 3 | Status: SHIPPED | OUTPATIENT
Start: 2022-03-08

## 2022-03-08 RX ORDER — ATORVASTATIN CALCIUM 40 MG/1
40 TABLET, FILM COATED ORAL DAILY
Status: DISCONTINUED | OUTPATIENT
Start: 2022-03-09 | End: 2022-03-08 | Stop reason: HOSPADM

## 2022-03-08 RX ORDER — EMPAGLIFLOZIN 10 MG/1
1 TABLET, FILM COATED ORAL DAILY
Qty: 30 TABLET | Refills: 1 | Status: SHIPPED | OUTPATIENT
Start: 2022-03-08 | End: 2022-06-07 | Stop reason: SDUPTHER

## 2022-03-08 RX ORDER — SPIRONOLACTONE 25 MG/1
25 TABLET ORAL DAILY
Qty: 30 TABLET | Refills: 3 | Status: SHIPPED | OUTPATIENT
Start: 2022-03-09

## 2022-03-08 RX ADMIN — ENOXAPARIN SODIUM 30 MG: 30 INJECTION SUBCUTANEOUS at 07:52

## 2022-03-08 RX ADMIN — ALLOPURINOL 100 MG: 100 TABLET ORAL at 07:51

## 2022-03-08 RX ADMIN — SPIRONOLACTONE 25 MG: 25 TABLET ORAL at 07:51

## 2022-03-08 RX ADMIN — Medication 2000 UNITS: at 07:51

## 2022-03-08 RX ADMIN — OXYCODONE HYDROCHLORIDE AND ACETAMINOPHEN 500 MG: 500 TABLET ORAL at 07:52

## 2022-03-08 RX ADMIN — DOCUSATE SODIUM 100 MG: 100 CAPSULE, LIQUID FILLED ORAL at 07:51

## 2022-03-08 RX ADMIN — CARVEDILOL 6.25 MG: 6.25 TABLET, FILM COATED ORAL at 16:52

## 2022-03-08 RX ADMIN — ASPIRIN 81 MG: 81 TABLET, COATED ORAL at 07:51

## 2022-03-08 RX ADMIN — SODIUM CHLORIDE, PRESERVATIVE FREE 10 ML: 5 INJECTION INTRAVENOUS at 08:00

## 2022-03-08 RX ADMIN — CARVEDILOL 6.25 MG: 6.25 TABLET, FILM COATED ORAL at 07:51

## 2022-03-08 RX ADMIN — INSULIN LISPRO 2 UNITS: 100 INJECTION, SOLUTION INTRAVENOUS; SUBCUTANEOUS at 11:30

## 2022-03-08 NOTE — PROGRESS NOTES
[address]   Daily Progress Note    Admit Date:  3/2/2022  HPI:  No chief complaint on file. Interval history: Jair French is being followed for shortness of breath. Presented to [facility] with dyspnea, work up showed LVEF drop, also treated for COVID pneumonia. Subjective:  Ms. Villarreal Felt denies any shortness of breath . No chest pain . Son at the bedside.      Objective:   /62   Pulse 75   Temp 98.3 °F (36.8 °C) (Oral)   Resp 18   Wt 194 lb 8 oz (88.2 kg)   SpO2 95%   BMI 34.45 kg/m²       Intake/Output Summary (Last 24 hours) at 3/8/2022 1345  Last data filed at 3/8/2022 1338  Gross per 24 hour   Intake 600 ml   Output 500 ml   Net 100 ml       NYHA: IV    Physical Exam:  General:  Awake, alert, NAD, Ute  Skin:  Warm and dry  Neck:  JVD difficult to assess due to body habitus   Chest:  Dim to auscultation throughout,  no wheezes/rhonchi/rales  Telemetry: NSR   Cardiovascular:  RRR S1S2, no m/r/g   Abdomen:  Soft, nontender, +bowel sounds  Extremities:  no bilateral lower extremity edema, right groin without hematoma or bruising, pedal pulses 2+     Medications:    spironolactone  25 mg Oral Daily    insulin lispro  0-12 Units SubCUTAneous TID WC    sodium chloride flush  5-40 mL IntraVENous 2 times per day    polyethylene glycol  17 g Oral Daily    ascorbic acid  500 mg Oral BID    aspirin  81 mg Oral Daily    carvedilol  6.25 mg Oral BID WC    docusate sodium  100 mg Oral Daily    Vitamin D  2,000 Units Oral Daily    allopurinol  100 mg Oral Daily    enoxaparin  30 mg SubCUTAneous Daily      sodium chloride      sodium chloride      dextrose         Lab Data:  CBC:   Recent Labs     03/07/22  0942 03/08/22  0502   WBC 6.3 6.7   HGB 11.9* 11.5*    242     BMP:    Recent Labs     03/07/22  0530 03/07/22  0942 03/08/22  0502    141 139   K 4.0 4.1 4.1   CO2 23 25 23   BUN 26* 26* 21*   CREATININE 1.0 0.9 0.9     INR:  No results for input(s): INR in the last 72 hours. BNP:    No results for input(s): PROBNP in the last 72 hours. Lab Results   Component Value Date    LVEF 70 11/21/2016       Testing:  Echo 2/23/22   Summary   LV systolic function is reduced with ejection fraction estimated at 35%.   There is mild global hypokinesis.   There is mild concentric left ventricular hypertrophy.   Elevated left ventricular diastolic filling pressure.   The right atrium is mildly dilated.   Mild posterior mitral annular calcification is present.   Aortic valve appears sclerotic but opens adequately.   Moderate mitral regurgitation.   Mild aortic, tricuspid, and pulmonic regurgitation is present.   Systolic pulmonary artery pressure (SPAP) estimated at 76 mmHg (RA pressure 8 mmHg), consistent with severe pulmonary hypertension.  Kacey Callander is a trivial circumferential pericardial effusion noted.   11/21/2016 EF greater than 70%Dynamic outflow tract obstruction, LVH, DD1, posterior MAC    Stress test 3/1/22   Summary  Moderate sized inferolateral and lateral partial reversibility defects  consistent with ischemia and infarction in the territory of the mid LCx  and/or RCA. LV function is moderately reduced with global hypokinesis and  ejection fraction of 32%. HIgher risk abnormal study. Cardiac cath 3/7/22  PROCEDURE DESCRIPTION      Risks/benefits/alternatives/outcomes were discussed with patient and/or family and informed consent was obtained. Using the [facility] scale, the patient's right radial artery was found to be a level B. Patient was prepped draped in the usual sterile fashion. Local anaesthetic was applied over puncture site. Using a back wall technique, a 4/5 Khmer Terumo sheath was inserted into right radial artery. Verapamil, nitroglycerin, nicardipine were administered through the sheath. Heparin was administered. Diagnostic 5 Kiswahili pigtail, ultra catheters were used for diagnostic angiograms.   At the conclusion of the procedure, a TR band was placed prognosis would be guarded given severe CAD/ASHD. There is a possibility of disease progression/clinical deterioration either with or without aggressive measures/treatments. D/w them obtaining 2nd opinion, they are not interested in that.       Addendum:     F/u conversation/discussion w/ pt and family, they are leaning toward higher risk staged outpt MV PCI. They understand r/b/a/o and will let us know w/ regard to final decision. Principal Problem:    Acute systolic CHF (congestive heart failure) (HCC)  Active Problems:    Mixed hyperlipidemia    Diabetes mellitus ([facility] [address]    COVID-19    Primary hypertension    Abnormal stress test    Ischemic cardiomyopathy    JANAE (acute kidney injury) ([facility] [address]  Resolved Problems:    * No resolved hospital problems. *      Assessment:  Acute systolic heart failure  Cardiomyopathy, HFrEF 35%, ischemic  Acute hypoxemic respiratory failure- resolved  Multivessel CAD- med mgmt   COVID pneumonia   Severe pulmonary HTn  HTN  HLD- statin allergy  DM    Plan:  Continue aspirin and coreg   Restart entresto  Continue Spironolactone (aldactone) 25mg daily   Repeat BMP in 1 week  Patient has statin allergy noted at leg pains, recommend re-trial low dose statin given severe CAD. Patient and family agreeable. Add jardiance at discharge     Cardiac meds addressed on med rec. BMP in 1 week     Will follow up as outpatient regarding planning any staged PCI once she has had some time to recover at rehab. Education provided today Disease process and medications discussed. Questions answered fully.    Total Time spent educating today 25 minutes     POOL Foley - CNP,  3/8/2022, 1:45 PM

## 2022-03-08 NOTE — DISCHARGE SUMMARY
Hospital Medicine Discharge Summary    Patient: Clair Nguyen     Age: 81 y.o. Gender: female  : 1935   MRN: [id number]  Code Status: Full     Admit Date: 3/2/2022   Discharge Date: 3/8/2022    Disposition:  [facility])    Condition at Discharge: Stable    Primary Care Provider: Watson Canas DO    Admitting Physician: Shad Pinto MD  Discharge Physician: Shad Pinto MD     Discharge Diagnoses: Active Hospital Problems    Diagnosis     Ischemic cardiomyopathy [I25.5]     JANAE (acute kidney [facility]) ([facility] [address] [N17.9]     Abnormal stress test [R94.39]     Primary hypertension [I10]     Acute systolic CHF (congestive heart failure) ([facility] [address] [I50.21]     COVID-19 [U07.1]     Mixed hyperlipidemia [E78.2]     Diabetes mellitus ([facility] [address] [E11.9]        Hospital Course:   Clair Nguyen is a 81 y.o. female who was transferred from [facility] for further evaluation and treatment by invasive cardiology. The patient was recently hospitalized in the ICU following COVID-19 diagnosis and subsequent ischemic cardiomyopathy with heart failure. Stress test was performed and noted to be abnormal therefore it was recommended that she be transferred to this facility for further intervention. Cardiac risk factors include: HTN, HLD, CMP, type II DM, and former smoker. Review of epic chart reveals the patient was experiencing worsening dyspnea with respiratory acidosis upon presentation to OSH therefore she expanded to the ICU and placed on BiPAP therapy. Echo was obtained revealing new onset systolic dysfunction in addition to severe PAH. Patient was diagnosed with SARS-CoV-2 during her hospital staY, despite being fully vaccinated and s/p booster x1. Notable labs prior to transfer include: JANAE with BUN 50/1.5 CrCl 29 cc/min, hyperglycemia 151, BNP 9052, and marked hyperlipidemia with statin intolerance.      Assessment/Plan:    Abnormal nuclear GXT  -Continue ACS a 81 mg daily -HLD untreated due to intolerance to multiple statins  -Cardiology following  --LHC completed; found to have multivessel disease. --CT Surgery consulted. Patient adamantly opposed to surgery and would prefer high risk PCI. --May need to allow for renal stability after initial contrast exposure before PCI, possibly coming back as outpatient. Acute on chronic systolic CHF  -Continue current regimen with Coreg, Demadex, Aldactone, and Entresto  -ECHO noted  --Likely ischemic cardiomyopathy based on [facility] findings. JANAE/CKD: Improving/stable. Monitor as outpatient after C. Type II DM  -POC glucose every 4 hours while NPO, with medium dose Humalog SSI    COVID-19 in former smoker  -Positive test was 2/23/22, however, she presented to PCP on 2/22/22 with complaint of worsening SOB, so suspect symptom onset even sooner. Has completed > 10 days of appropriate Isolation and Droplet Plus precautions were discontinued. Exam:   /62   Pulse 75   Temp 98.4 °F (36.9 °C) (Oral)   Resp 20   Wt 194 lb 8 oz (88.2 kg)   SpO2 95%   BMI 34.45 kg/m²   General appearance:  No apparent distress, appears stated age and cooperative. HEENT:  Pupils equal, round, and reactive to light. Conjunctivae/corneas clear. Neck:  Supple, no jugular venous distention. Trachea midline with full range of motion. Respiratory:  Normal respiratory effort. Diminished but clear to auscultation, bilaterally without Rales/Wheezes/Rhonchi. Cardiovascular:  Regular rate and rhythm with normal S1/S2 without murmurs, rubs or gallops. Abdomen:  Soft, non-tender, non-distended with normal bowel sounds. Musculoskelatal:  No clubbing, cyanosis or edema bilaterally. Full range of motion without deformity. Neurologic:  Neurovascularly intact without any focal sensory/motor deficits.  Cranial nerves: II-XII intact, grossly non-focal.  Psychiatric:  Alert and oriented, thought content appropriate, normal insight  Skin:  Skin color, texture, turgor normal.  No rashes or lesions.   Capillary Refill:  Brisk,< 3 seconds   Peripheral Pulses:  +2 palpable, equal bilaterally       Patient Discharge Instructions:   PT/OT  Follow-up with Cardiology to discuss high risk PCI    Discharge Medications:   Discharge Medication List as of 3/8/2022  5:59 PM      START taking these medications    Details   carvedilol (COREG) 6.25 MG tablet Take 1 tablet by mouth 2 times daily (with meals), Disp-60 tablet, R-3Print      spironolactone (ALDACTONE) 25 MG tablet Take 1 tablet by mouth daily, Disp-30 tablet, R-3Print      atorvastatin (LIPITOR) 40 MG tablet Take 1 tablet by mouth daily, Disp-30 tablet, R-3Print      empagliflozin (JARDIANCE) 10 MG tablet Take 1 tablet by mouth daily, Disp-30 tablet, R-1Print      sacubitril-valsartan (ENTRESTO) 24-26 MG per tablet Take 1 tablet by mouth 2 times daily, Disp-60 tablet, R-3Print           Discharge Medication List as of 3/8/2022  5:59 PM      CONTINUE these medications which have CHANGED    Details   aspirin 81 MG EC tablet Take 1 tablet by mouth daily Indications: OTC, Disp-30 tablet, R-3Print           Discharge Medication List as of 3/8/2022  5:59 PM      CONTINUE these medications which have NOT CHANGED    Details   allopurinol (ZYLOPRIM) 100 MG tablet Take 100 mg by mouth dailyHistorical Med      !! ACCU-CHEK JERRY PLUS strip USE TO TEST BLOOD SUGAR ONCE DAILY, Disp-100 strip, R-5Normal      Handicap Placard MISC Disp-1 each, R-0, Print11/9/21-11/8/26      metFORMIN (GLUCOPHAGE) 500 MG tablet TAKE 1 TABLET EVERY DAY, Disp-90 tablet, R-1Normal      Multiple Vitamins-Minerals (CENTRUM ADULTS PO) Take by mouthHistorical Med      !! Glucose Blood (BLOOD GLUCOSE TEST STRIPS) STRP 1 strip by Does not apply route three times daily Patient requesting True Results brand, Does not apply, 3 TIMES DAILY Starting Thu 5/3/2018, Disp-100 strip, R-3, Normal      glucose monitoring kit (FREESTYLE) monitoring kit DAILY PRN Starting 9/7/2016, Until Discontinued, Disp-1 kit, R-0, NormalDx E11.9-uses once daily-needs Acucheck Daphne Plus       !! - Potential duplicate medications found. Please discuss with provider. Discharge Medication List as of 3/8/2022  5:59 PM      STOP taking these medications       amLODIPine (NORVASC) 5 MG tablet Comments:   Reason for Stopping:         lisinopril-hydroCHLOROthiazide (PRINZIDE;ZESTORETIC) 10-12.5 MG per tablet Comments:   Reason for Stopping:         furosemide (LASIX) 20 MG tablet Comments:   Reason for Stopping:                 Significant Test Results    No results found. Consults:     IP CONSULT TO HEART FAILURE NURSE/COORDINATOR  IP CONSULT TO CARDIOLOGY  IP CONSULT TO NEPHROLOGY  IP CONSULT TO SPIRITUAL SERVICES  IP CONSULT TO CARDIOTHORACIC SURGERY    Labs: For convenience and continuity at follow-up the following most recent labs are provided:    Lab Results   Component Value Date    WBC 6.7 03/08/2022    HGB 11.5 03/08/2022    HCT 35.6 03/08/2022    MCV 83.4 03/08/2022     03/08/2022     03/08/2022    K 4.1 03/08/2022     03/08/2022    CO2 23 03/08/2022    BUN 21 03/08/2022    CREATININE 0.9 03/08/2022    CALCIUM 9.7 03/08/2022    TROPONINI <0.01 02/23/2022    ALKPHOS 81 03/03/2022    ALT 33 03/03/2022    AST 19 03/03/2022    BILITOT 0.6 03/03/2022    BILIDIR 0.12 10/17/2013    LABALBU 3.4 03/03/2022    LDLCALC 162 02/24/2022    TRIG 174 02/24/2022    LABA1C 6.4 02/23/2022     No results found for: INR      The patient was seen and examined on day of discharge and this discharge summary is in conjunction with any daily progress note from day of discharge. Time spent on discharge is more than 30 minutes in the examination, evaluation, counseling and review of medications and discharge plan. Signed:    Caryle Conch, MD   4/10/2022    Thank you Rafael Rivera DO for the opportunity to be involved in this patient's care.  If you have any questions or concerns please feel free to contact my office [phone].

## 2022-03-08 NOTE — CARE COORDINATION
CASE MANAGEMENT DISCHARGE SUMMARY      Discharge to:  [facility] [facility] Ana Rebecca completed: yes  Hospital Exemption Notification (HENS) completed: yes    IMM given: (date) 3/7/22    Transportation: ambulance    Medical Transport explained to pt/family. Pt/family voice no agency preference. Agency used: [facility]    time: 6pm   Ambulance form completed: Yes    Confirmed discharge plan with: patient/son/RN/Iliana with [facility]     Patient: yes     Family:  yes    Name: Contact number:     Facility/Agency, name:  [facility] faxed   Phone number for report to facility:   [phone]   RN, name: [place]     Note: Discharging nurse to complete [facility], reconcile [facility], and place final copy with patient's discharge packet. RN to ensure that written prescriptions for  Level II medications are sent with patient to the facility as per protocol.

## 2022-03-08 NOTE — CONSULTS
[address] [facility] 1935    History:  Past Medical History:   Diagnosis Date    Hypertension     Mixed hyperlipidemia     Hyperlipidemia    Type II or unspecified type diabetes mellitus without mention of complication, not stated as uncontrolled        ECHO:  2/23/22  EF 35%  HgA1C:  2/23/22  6.4    ACE/ARB: Entresto on hold     BB: Carvedilol 6.25 mg BID    Aldactone 25 mg Daily    Last Hospital Admission:  2/23/22  Acute respiratory failure with hypoxia and hypercapnia    Discharge plans: to [address]: patient has advance directives scanned in the chart    Chart review completed. Patient a 81year old female, admitted for ischemic cardiomyopathy. Pt seen by cardiothorasic surgery for possible CABG however pt elect staged PCI instead. Nephrology saw today and ok for restart of nephrotoxic medications. [facility]. There they will mange her diet and fluid intake as well as monitor daily weights and for any other s/s of CHF.       Patient recent weights and intake/output reviewed:    Patient Vitals for the past 96 hrs (Last 3 readings):   Weight   03/08/22 0528 194 lb 8 oz (88.2 kg)   03/07/22 0502 196 lb 9.6 oz (89.2 kg)   03/06/22 0422 192 lb (87.1 kg)         Intake/Output Summary (Last 24 hours) at 3/8/2022 1240  Last data filed at 3/8/2022 0528  Gross per 24 hour   Intake 360 ml   Output 500 ml   Net -140 ml       Education Time: Chart review completed    Krish Lee RN   3/8/2022 12:40 PM

## 2022-03-08 NOTE — TELEPHONE ENCOUNTER
----- Message from King Patel MD sent at 3/8/2022  8:25 AM EST -----  Lets get appt at [facility]/ dr Christa Salgado or NP for hosp f/u. Thank you.

## 2022-03-08 NOTE — PROGRESS NOTES
Physical Therapy  Facility/Department: [facility] C[facility]U  Daily Treatment Note  NAME: Amrik Cooney  : 1935  MRN: [id number]    Date of Service: 3/8/2022    Discharge Recommendations:  Subacute/Skilled Nursing Facility   PT Equipment Recommendations  Equipment Needed: No  Other: defer to next level of care  If pt discharges prior to next PT session this note will serve as discharge summary. Assessment   Body structures, Functions, Activity limitations: Decreased functional mobility ; Decreased strength;Decreased safe awareness;Decreased endurance;Decreased cognition;Decreased balance;Decreased posture  Assessment: Pt participated in therapeutic ex , transfers and gait training wtih encouragement and constant cues for proper performance and safety. AM PAC score improved from 10 to 14 today. Pt will benefit from skilled PT to address deficits. Recommend SNF at discharge  Treatment Diagnosis: Decreased independence with mobility  Specific instructions for Next Treatment: Progress ther ex and mobility as tolerated  Prognosis: Good  Decision Making: Medium Complexity  PT Education: Goals; General Safety;PT Role;Plan of Care;Disease Specific Education; Functional Mobility Training;Orientation;Equipment;Precautions;Transfer Training;Energy Conservation  Patient Education: Disease-specific education: Pt educated on role of PT in hospital, benefits of regular OOB mobility, general safety during hospital stay - pt verbalizes understanding, will likely require some reinforcement. Barriers to Learning: none  REQUIRES PT FOLLOW UP: Yes  Activity Tolerance  Activity Tolerance: Patient Tolerated treatment well;Patient limited by endurance  Activity Tolerance: /62  HR 75  SpO2 95%     Patient Diagnosis(es): The primary encounter diagnosis was JANAE (acute kidney injury) ([facility] [address] A diagnosis of Ischemic cardiomyopathy was also pertinent to this visit.      has a past medical history of Hypertension, Mixed hyperlipidemia, and Type II or unspecified type diabetes mellitus without mention of complication, not stated as uncontrolled. has a past surgical history that includes lipoma resection and knee surgery (Right, 11/22/2016). Restrictions  Restrictions/Precautions: General Precautions,Fall Risk  Other position/activity restrictions: High fall risk per nursing assessment, PureWick, telemetry  Subjective   General  Chart Reviewed: Yes  Additional Pertinent Hx: Recently COVID+ although out of isolation as of 3/03/22  Response To Previous Treatment: Patient with no complaints from previous session. Family / Caregiver Present: Yes  Referring Practitioner: Stephanie Jackson MD  Subjective  Subjective: Pt agreeable to PT this date. Supine in bed upon approach  General Comment  Comments: RN cleared pt for therapy  Pain Screening  Patient Currently in Pain: Denies  Vital Signs- see activity tolerance above  Patient Currently in Pain: Denies       Orientation  Orientation Level: Oriented to person;Oriented to place;Oriented to time;Disoriented to situation     Objective   Bed mobility  Rolling to Left: Moderate assistance  Supine to Sit: Moderate assistance  Sit to Supine: Contact guard assistance  Scooting: Minimal assistance  Comment: cues for positioning and use of bed rails during bed mobility assessment     Transfers  Sit to Stand: Minimal Assistance  Stand to sit: Contact guard assistance     Ambulation  Surface: level tile  Device: Rolling Walker  Assistance: Moderate assistance  Quality of Gait: constant cues for sequence and hand positioning on walker as well as body positioning within walker frame.  Assist to navigate walker and at gait belt for balance  Distance: 25 ft  Comments: Distance limited by fatigue        Exercises  Straight Leg Raise: 5 x B, effortful  Quad Sets: 10 x B  Heelslides: 10 x B  Gluteal Sets: 10 x B  Hip Abduction: 10 x B  Knee Long Arc Quad: 5 x B  Ankle Pumps: 10 x B        AM-PAC Score     AM-PAC Inpatient Mobility without Stair Climbing Raw Score : 14 (03/08/22 1532)  AM-PAC Inpatient without Stair Climbing T-Scale Score : 40.85 (03/08/22 1532)  Mobility Inpatient CMS 0-100% Score: 53.33 (03/08/22 1532)  Mobility Inpatient without Stair CMS G-Code Modifier : CK (03/08/22 1532)   Goals  Short term goals  Time Frame for Short term goals: 1 week, 3/10/22 (unless otherwise specified)  Short term goal 1: Pt will transfer supine <-> sit with CGA x 1--3/8/22 mod assist  Short term goal 2: Pt will transfer sit <-> stand and bed>chair using RW with Yue x 1--3/8 min assist  Short term goal 3: Pt will ambulate x 40 feet using RW with Yue x --3/8 25 ft wtih RW and mod assist  Short term goal 4: By 3/06/22: Pt will tolerate 12-15 reps BLE exercise for strengthening, balance, and endurance--3/8 progressing  Patient Goals   Patient goals : \" to walk with walker\"    Plan    Times per week: 3-5x/week in acute care  Times per day: Daily  Specific instructions for Next Treatment: Progress ther ex and mobility as tolerated  Current Treatment Recommendations: Strengthening,Balance Training,Endurance Training,Functional Mobility Training,Transfer Training,Gait Training,Safety Education & Training,Equipment Evaluation, Education, & procurement,Positioning,Home Exercise Program,Patient/Caregiver Education & Training  Safety Devices  Type of devices:  All fall risk precautions in place,Call light within reach,Nurse notified,Gait belt,Patient at risk for falls,Bed alarm in place,Left in bed     Therapy Time   Individual Concurrent Group Co-treatment   Time In 1500         Time Out 1525         Minutes [id number] [facility], PT

## 2022-03-08 NOTE — PROGRESS NOTES
Nephrology Progress  Note                                                                                                                                                                                                                                                                                                                                                               Office : [phone]     Fax :[phone]              Patient's Name: Sam Johnson  12:26 PM  3/8/2022    Reason for Consult: JANAE      Requesting Physician:  Devin Fisher DO      Chief Complaint:  CHF    History of Present Ilness:    Sam Johnson is a 81 y.o. female with PMH of HTN , CMP , DM , HTN    Transferred from [facility] for cardiology eval for ischemic cardiomyopathy    Stress test was performed and noted to be abnormal therefore it was recommended that she be transferred to this facility for further intervention. Nephrology consult for JANAE management before cardiac cath.     Patient got IVF NS overnight ,serum cr trending down and close to baseline now    Denies chest pain or diarrhea or vomiting   Not in acute distress       Interval History :      S/p LHC on 3/7 : triple vessel CAD  Serum cr at baseline     Not in acute distress  No diarrhea or vomiting   Creatinine improved to  baseline        Past Medical History:   Diagnosis Date    Hypertension     Mixed hyperlipidemia     Hyperlipidemia    Type II or unspecified type diabetes mellitus without mention of complication, not stated as uncontrolled        Past Surgical History:   Procedure Laterality Date    KNEE SURGERY Right 11/22/2016    LIPOMA RESECTION         Current Medications:    0.9 % sodium chloride infusion, PRN  acetaminophen (TYLENOL) tablet 650 mg, Q4H PRN  spironolactone (ALDACTONE) tablet 25 mg, Daily  insulin lispro (HUMALOG) injection vial 0-12 Units, TID WC  0.9 % sodium chloride infusion, Continuous  0.9 % sodium chloride infusion, PRN  sodium chloride flush 0.9 % injection 5-40 mL, 2 times per day  sodium chloride flush 0.9 % injection 5-40 mL, PRN  magnesium sulfate 1000 mg in dextrose 5% 100 mL IVPB, PRN  ondansetron (ZOFRAN-ODT) disintegrating tablet 4 mg, Q8H PRN   Or  ondansetron (ZOFRAN) injection 4 mg, Q6H PRN  polyethylene glycol (GLYCOLAX) packet 17 g, Daily  potassium chloride (KLOR-CON M) extended release tablet 40 mEq, PRN   Or  potassium bicarb-citric acid (EFFER-K) effervescent tablet 40 mEq, PRN   Or  potassium chloride 10 mEq/100 mL IVPB (Peripheral Line), PRN  sodium chloride flush 0.9 % injection 10 mL, PRN  ascorbic acid (VITAMIN C) tablet 500 mg, BID  aspirin EC tablet 81 mg, Daily  bisacodyl (DULCOLAX) suppository 10 mg, Daily PRN  carvedilol (COREG) tablet 6.25 mg, BID WC  dextrose bolus (hypoglycemia) 10% 125 mL, PRN   Or  dextrose bolus (hypoglycemia) 10% 250 mL, PRN  docusate sodium (COLACE) capsule 100 mg, Daily  Vitamin D (CHOLECALCIFEROL) tablet 2,000 Units, Daily  allopurinol (ZYLOPRIM) tablet 100 mg, Daily  glucose (GLUTOSE) 40 % oral gel 15 g, PRN  glucagon (rDNA) injection 1 mg, PRN  dextrose 5 % solution, PRN  sodium chloride flush 0.9 % injection 10 mL, PRN  magnesium sulfate 2000 mg in 50 mL IVPB premix, PRN  senna (SENOKOT) tablet 8.6 mg, Daily PRN  acetaminophen (TYLENOL) tablet 650 mg, Q6H PRN   Or  acetaminophen (TYLENOL) suppository 650 mg, Q6H PRN  enoxaparin (LOVENOX) injection 30 mg, Daily  albuterol sulfate  (90 Base) MCG/ACT inhaler 2 puff, Q4H PRN   And  ipratropium (ATROVENT HFA) 17 MCG/ACT inhaler 2 puff, Q4H PRN        Physical exam:     Vitals:  BP (!) 144/76   Pulse 85   Temp 98.3 °F (36.8 °C) (Oral)   Resp 18   Wt 194 lb 8 oz (88.2 kg)   SpO2 94%   BMI 34.45 kg/m²   Constitutional:  OAA X3 NAD  Skin: no rash, turgor wnl  Heent:  eomi, mmm  Neck: no bruits or jvd noted  Cardiovascular:  S1, S2 without m/r/g  Respiratory: CTA B without w/r/r  Abdomen:  +bs, soft, nt, nd  Ext: no  lower extremity edema    Data:   Labs:  CBC:   Recent Labs     03/07/22  0942 03/08/22  0502   WBC 6.3 6.7   HGB 11.9* 11.5*    242     BMP:    Recent Labs     03/07/22  0530 03/07/22  0942 03/08/22  0502    141 139   K 4.0 4.1 4.1    105 106   CO2 23 25 23   BUN 26* 26* 21*   CREATININE 1.0 0.9 0.9   GLUCOSE 132* 141* 115*     Ca/Mg/Phos:   Recent Labs     03/07/22  0530 03/07/22  0942 03/08/22  0502   CALCIUM 9.5 10.1 9.7     Hepatic:   No results for input(s): AST, ALT, ALB, BILITOT, ALKPHOS in the last 72 hours. Troponin: No results for input(s): TROPONINI in the last 72 hours. BNP: No results for input(s): BNP in the last 72 hours. Lipids: No results for input(s): CHOL, TRIG, HDL, LDLCALC, LABVLDL in the last 72 hours. ABGs: No results for input(s): PHART, PO2ART, UQK3IDC in the last 72 hours. INR: No results for input(s): INR in the last 72 hours. UA:No results for input(s): Robyne Lang, GLUCOSEU, BILIRUBINUR, Birdie Person, BLOODU, PHUR, PROTEINU, UROBILINOGEN, NITRU, LEUKOCYTESUR, LABMICR, URINETYPE in the last 72 hours. Urine Microscopic: No results for input(s): LABCAST, BACTERIA, COMU, HYALCAST, WBCUA, RBCUA, EPIU in the last 72 hours. Urine Culture: No results for input(s): LABURIN in the last 72 hours. Urine Chemistry:   No results for input(s): Jerene Query, PROTEINUR, NAUR in the last 72 hours. IMAGING:  No orders to display       Assessment/Plan     1.   Acute renal failure     Creat almost at baseline     Baseline serum cr ~ 1      2 CHF , acute       3 COIVD 19 infection     4 Ischemic cardiomyopathy    5 DLP    6 HTN    Plan    DC IVF    Renal fn at baseline, can restart diuretics /ACEI or ARB          Avoid NSAIDs    Keep SBP > 120 mmhg                Thank you for allowing us to participate in care of Richard Ho MD  Feel free to contact me   [facility] [address]  Office : [phone]  Fax :[phone]

## 2022-03-08 NOTE — PROGRESS NOTES
Discharge orders noted. Patient's IV and telemetry box removed without complications. Discharge paperwork reviewed with patient, verbalized understanding. Patient discharge via EMS to [facility]. [address] notified.

## 2022-03-09 ENCOUNTER — TELEPHONE (OUTPATIENT)
Dept: FAMILY MEDICINE CLINIC | Age: 87
End: 2022-03-09

## 2022-03-09 LAB
POC ACT LR: 202 SEC
POC ACT LR: 205 SEC

## 2022-03-09 NOTE — TELEPHONE ENCOUNTER
[facility] Transitions Initial Follow Up Call    Outreach made within 2 business days of discharge: Yes    Patient: Donita Paniagua Patient : 1935   MRN: [id number]  Reason for Admission: There are no discharge diagnoses documented for the most recent discharge. Discharge Date: 3/8/22       Spoke with: Called patient, no answer, LVM for return call. Patient is currently in SNF. [place]charge department[facility]facility[place] [facility] [place] [facility] and [place] to [facility] from [address] [place] [address]  provided:  Obtained and reviewed discharge summary and/or continuity of care documents    TCM Interactive Patient Contact:  Was patient able to fill all prescriptions: Yes  Was patient instructed to bring all medications to the follow-up visit: Yes  Is patient taking all medications as directed in the discharge summary?  Yes  Does patient understand their discharge instructions: Yes  Does patient have questions or concerns that need addressed prior to 7-14 day follow up office visit: no    Scheduled appointment with PCP within 7-14 days    Follow Up  Future Appointments   Date Time Provider Carmencita Jean-Baptiste   2022 12:45 PM Elias Isaacs MD P CLER [facility]   2022  1:30 PM DO TYLER Valdivia RN

## 2022-03-21 ENCOUNTER — TELEPHONE (OUTPATIENT)
Dept: CARDIOLOGY CLINIC | Age: 87
End: 2022-03-21

## 2022-03-21 ENCOUNTER — TELEPHONE (OUTPATIENT)
Dept: CARDIOLOGY | Age: 87
End: 2022-03-21

## 2022-03-21 NOTE — TELEPHONE ENCOUNTER
Lets reach out to pt and her daughter to change appt from dr Jeannette Villalobos to me per their request to consider multivessel PCI rather than CABG. If they are interested in a CT surgery referral, lets make sure they get that. Additionally they will need follow-up labs such as BMP and CBC to be done. Thank you.

## 2022-03-21 NOTE — TELEPHONE ENCOUNTER
Pt's daughter Nick Webster wants to get pt scheduled for angiogram. Pt was in hospital and taken to cath lab but PCR not done, daughter not sure why. Pt has [facility] 4/1/22 with JOSE ARMANDO. Please advise.

## 2022-03-21 NOTE — TELEPHONE ENCOUNTER
Dr. Aidee Garibay, can she be overbooked for you? Your next available is in [facility] 4/12/2022. I did put her on for this day.

## 2022-03-21 NOTE — TELEPHONE ENCOUNTER
I spoke personally to MsOwen Paulette Triplett (daughter) who called and she informed me that they would like to switch to Dr. Sarah Dsouza. Please make appt to see him asap and let me know date so I can discuss case with him. Please call daughter as soon as you can to give date. Thanks.

## 2022-03-21 NOTE — TELEPHONE ENCOUNTER
I d/w Dr. Maxine Albarado and he will make f/u appt to discuss PCI and angiogram with patient and daughter.

## 2022-03-21 NOTE — TELEPHONE ENCOUNTER
Also, lets make this appt for a 60min slot (may need to combine appt slots), pt/family will need additional time to discuss complex issues. Thank you.

## 2022-03-22 NOTE — TELEPHONE ENCOUNTER
I spoke with Miguel Morocho, pt's contact, she wants to know if pt can see someone else that does angioplasty's next week. I informed marnie that according to these notes, it looks like the pt wants to switch from [address] [place] [address] to [facility]. I informed her that we cannot switch physicians without having approval. She is wanting the pt to see an interventionalist next week as a new pt to discuss angioplasty. She would prefer to not wait to [facility] in [facility] for 4/12/22. I informed marnie that I will send this to our practice manager and get back with her by calling her back at [phone].

## 2022-03-22 NOTE — TELEPHONE ENCOUNTER
Spoke to daughter Ajay Gordon. Discussed scheduling an appt with DR Fonseca on 03/31 at 3:45 as an add-on. Discussed with Jeanette that Dr Radha Dewey is the interventionist who performs the  procedures and it would be best to schedule with DR Radha Dewey. She stated she would discuss with her family and call back with a decision.

## 2022-03-22 NOTE — TELEPHONE ENCOUNTER
Lets have her f/u w/ dr Rigo Hale on 4/12/2022 per pt/family request, lets let the pt/family know/confirm the appt. .  Thank you.

## 2022-03-22 NOTE — TELEPHONE ENCOUNTER
Pt's daughter Ijeoma Oconnors called and wanted to see if we had a date to over book for Dariana Mackay. I advised we are waiting to hear from Dariana Mackay for a date when we can overbook. Please call Jeanette @ [phone] once we have a date.

## 2022-03-23 NOTE — TELEPHONE ENCOUNTER
Daughter did not want Dr. Helane Paget and wants to speak to another interventionalist. I would have her see Dr. Abisai Hodge and he can decide what is needed and discuss if Dr. Helane Paget is needed and address issues with family at that time. Thanks.

## 2022-03-25 ENCOUNTER — TELEPHONE (OUTPATIENT)
Dept: FAMILY MEDICINE CLINIC | Age: 87
End: 2022-03-25

## 2022-04-11 ENCOUNTER — TELEPHONE (OUTPATIENT)
Dept: FAMILY MEDICINE CLINIC | Age: 87
End: 2022-04-11

## 2022-04-11 DIAGNOSIS — I50.9 CHRONIC CONGESTIVE HEART FAILURE, UNSPECIFIED HEART FAILURE TYPE (HCC): ICD-10-CM

## 2022-04-11 DIAGNOSIS — I10 ESSENTIAL HYPERTENSION: ICD-10-CM

## 2022-04-11 DIAGNOSIS — I42.9 CARDIOMYOPATHY, UNSPECIFIED TYPE (HCC): Primary | ICD-10-CM

## 2022-04-11 NOTE — TELEPHONE ENCOUNTER
Pt daughter calling in stating pt is need a PT order sent to [address] [place] [address] for pt.  [address] [place] [address] stated order would need to come from PCP     Jessica [phone]

## 2022-04-11 NOTE — TELEPHONE ENCOUNTER
Left VM for daughter to call back, wanted to let her know Dr. Frankie Brasher put orders in for PT and I faxed to [facility] KALIE [facility]

## 2022-05-06 ENCOUNTER — TELEPHONE (OUTPATIENT)
Dept: FAMILY MEDICINE CLINIC | Age: 87
End: 2022-05-06

## 2022-05-26 ENCOUNTER — TELEPHONE (OUTPATIENT)
Dept: FAMILY MEDICINE CLINIC | Age: 87
End: 2022-05-26

## 2022-05-26 DIAGNOSIS — E11.9 TYPE 2 DIABETES MELLITUS WITHOUT COMPLICATION, WITHOUT LONG-TERM CURRENT USE OF INSULIN (HCC): Primary | ICD-10-CM

## 2022-05-26 NOTE — TELEPHONE ENCOUNTER
Pt daughter (on  [address]) said that she is waiting for a glucometer to go to [facility] as the one she has no longer works. Pended order and pharmacy. Please sign. Once approved she would like a call.

## 2022-05-26 NOTE — TELEPHONE ENCOUNTER
[facility] Transitions Initial Follow Up Call    Outreach made within 2 business days of discharge: No    Patient: Isa Duane Patient : 1935   MRN: [id number]  Reason for Admission: There are no discharge diagnoses documented for the most recent discharge. Discharge Date: 3/8/22       Spoke with: Benji Marin pts daughter    Discharge department/facility: [facility] Patient Contact:  Was patient able to fill all prescriptions: Yes  Was patient instructed to bring all medications to the follow-up visit: Yes  Is patient taking all medications as directed in the discharge summary? Yes  Does patient understand their discharge instructions: Yes  Does patient have questions or concerns that need addressed prior to 7-14 day follow up office visit: yes - see refill request. Pt is in need of a glucometer as soon as possible.      Scheduled appointment with PCP within 7-14 days    Follow Up  Future Appointments   Date Time Provider Carmencita Jean-Baptiste   6/3/2022 10:15 AM DO TYLER Valdez  [facility]       [place], [place]

## 2022-05-31 DIAGNOSIS — E11.9 TYPE 2 DIABETES MELLITUS WITHOUT COMPLICATION, WITHOUT LONG-TERM CURRENT USE OF INSULIN (HCC): Primary | ICD-10-CM

## 2022-05-31 RX ORDER — GLUCOSAMINE HCL/CHONDROITIN SU 500-400 MG
CAPSULE ORAL
Qty: 100 STRIP | Refills: 5 | Status: SHIPPED | OUTPATIENT
Start: 2022-05-31

## 2022-05-31 RX ORDER — BLOOD SUGAR DIAGNOSTIC
STRIP MISCELLANEOUS
Qty: 100 STRIP | Refills: 5 | Status: CANCELLED | OUTPATIENT
Start: 2022-05-31

## 2022-05-31 RX ORDER — LANCETS 30 GAUGE
1 EACH MISCELLANEOUS DAILY
Qty: 100 EACH | Refills: 5 | Status: SHIPPED | OUTPATIENT
Start: 2022-05-31

## 2022-05-31 NOTE — TELEPHONE ENCOUNTER
Refill Request     CONFIRM preferrred pharmacy with the patient. If Mail Order Rx - Pend for 90 day refill. Last Seen: Last Seen Department: 2/18/2022    Last Seen by PCP: 6/29/2021    Last Written: 1/6/22 100 strip 5 refills    Next Appointment: 6/3/22  Future Appointments   Date Time Provider Carmencita Jean-Baptiste   6/3/2022 10:15 AM DO TYLER Valdez [place] [facility]       Future appointment scheduled      Requested Prescriptions     Pending Prescriptions Disp Refills    blood glucose test strips (ACCU-CHEK JERRY PLUS) strip 100 strip 5     Sig: As needed.

## 2022-05-31 NOTE — TELEPHONE ENCOUNTER
Refill request for test strips medication.      Name of [address] visit - 6/29/2021     Pending visit - 6/3/2022    Last refill -1/6/2022  5 refills

## 2022-06-03 ENCOUNTER — OFFICE VISIT (OUTPATIENT)
Dept: FAMILY MEDICINE CLINIC | Age: 87
End: 2022-06-03
Payer: MEDICARE

## 2022-06-03 ENCOUNTER — TELEPHONE (OUTPATIENT)
Dept: FAMILY MEDICINE CLINIC | Age: 87
End: 2022-06-03

## 2022-06-03 VITALS
OXYGEN SATURATION: 96 % | DIASTOLIC BLOOD PRESSURE: 70 MMHG | WEIGHT: 194 LBS | BODY MASS INDEX: 34.37 KG/M2 | SYSTOLIC BLOOD PRESSURE: 124 MMHG | HEART RATE: 91 BPM

## 2022-06-03 DIAGNOSIS — I25.10 CORONARY ARTERY DISEASE INVOLVING NATIVE CORONARY ARTERY OF NATIVE HEART WITHOUT ANGINA PECTORIS: Primary | ICD-10-CM

## 2022-06-03 DIAGNOSIS — E55.9 VITAMIN D DEFICIENCY: ICD-10-CM

## 2022-06-03 DIAGNOSIS — Z87.440 HISTORY OF UTI: ICD-10-CM

## 2022-06-03 DIAGNOSIS — E78.2 MIXED HYPERLIPIDEMIA: ICD-10-CM

## 2022-06-03 DIAGNOSIS — Z09 HOSPITAL DISCHARGE FOLLOW-UP: ICD-10-CM

## 2022-06-03 DIAGNOSIS — E11.9 TYPE 2 DIABETES MELLITUS WITHOUT COMPLICATION, WITHOUT LONG-TERM CURRENT USE OF INSULIN (HCC): ICD-10-CM

## 2022-06-03 LAB
A/G RATIO: 1.7 (ref 1.1–2.2)
ALBUMIN SERPL-MCNC: 3.8 G/DL (ref 3.4–5)
ALP BLD-CCNC: 82 U/L (ref 40–129)
ALT SERPL-CCNC: 13 U/L (ref 10–40)
ANION GAP SERPL CALCULATED.3IONS-SCNC: 13 MMOL/L (ref 3–16)
AST SERPL-CCNC: 18 U/L (ref 15–37)
BASOPHILS ABSOLUTE: 0 K/UL (ref 0–0.2)
BASOPHILS RELATIVE PERCENT: 0.8 %
BILIRUB SERPL-MCNC: <0.2 MG/DL (ref 0–1)
BUN BLDV-MCNC: 15 MG/DL (ref 7–20)
CALCIUM SERPL-MCNC: 10.1 MG/DL (ref 8.3–10.6)
CHLORIDE BLD-SCNC: 105 MMOL/L (ref 99–110)
CO2: 25 MMOL/L (ref 21–32)
CREAT SERPL-MCNC: 1 MG/DL (ref 0.6–1.2)
EOSINOPHILS ABSOLUTE: 0.2 K/UL (ref 0–0.6)
EOSINOPHILS RELATIVE PERCENT: 3.5 %
FOLATE: >20 NG/ML (ref 4.78–24.2)
GFR AFRICAN AMERICAN: >60
GFR NON-AFRICAN AMERICAN: 53
GLUCOSE BLD-MCNC: 108 MG/DL (ref 70–99)
HBA1C MFR BLD: 6.1 %
HCT VFR BLD CALC: 34.3 % (ref 36–48)
HEMOGLOBIN: 11.1 G/DL (ref 12–16)
LYMPHOCYTES ABSOLUTE: 1.3 K/UL (ref 1–5.1)
LYMPHOCYTES RELATIVE PERCENT: 23 %
MCH RBC QN AUTO: 28.4 PG (ref 26–34)
MCHC RBC AUTO-ENTMCNC: 32.4 G/DL (ref 31–36)
MCV RBC AUTO: 87.5 FL (ref 80–100)
MONOCYTES ABSOLUTE: 0.4 K/UL (ref 0–1.3)
MONOCYTES RELATIVE PERCENT: 6.9 %
NEUTROPHILS ABSOLUTE: 3.8 K/UL (ref 1.7–7.7)
NEUTROPHILS RELATIVE PERCENT: 65.8 %
PDW BLD-RTO: 19 % (ref 12.4–15.4)
PLATELET # BLD: 279 K/UL (ref 135–450)
PMV BLD AUTO: 8.1 FL (ref 5–10.5)
POTASSIUM SERPL-SCNC: 4.2 MMOL/L (ref 3.5–5.1)
RBC # BLD: 3.92 M/UL (ref 4–5.2)
SODIUM BLD-SCNC: 143 MMOL/L (ref 136–145)
T4 FREE: 1 NG/DL (ref 0.9–1.8)
TOTAL PROTEIN: 6 G/DL (ref 6.4–8.2)
TSH REFLEX: 4.98 UIU/ML (ref 0.27–4.2)
VITAMIN B-12: 501 PG/ML (ref 211–911)
VITAMIN D 25-HYDROXY: 32.4 NG/ML
WBC # BLD: 5.8 K/UL (ref 4–11)

## 2022-06-03 PROCEDURE — G8428 CUR MEDS NOT DOCUMENT: HCPCS | Performed by: FAMILY MEDICINE

## 2022-06-03 PROCEDURE — G8400 PT W/DXA NO RESULTS DOC: HCPCS | Performed by: FAMILY MEDICINE

## 2022-06-03 PROCEDURE — 99214 OFFICE O/P EST MOD 30 MIN: CPT | Performed by: FAMILY MEDICINE

## 2022-06-03 PROCEDURE — 1123F ACP DISCUSS/DSCN MKR DOCD: CPT | Performed by: FAMILY MEDICINE

## 2022-06-03 PROCEDURE — 3044F HG A1C LEVEL LT 7.0%: CPT | Performed by: FAMILY MEDICINE

## 2022-06-03 PROCEDURE — 1090F PRES/ABSN URINE INCON ASSESS: CPT | Performed by: FAMILY MEDICINE

## 2022-06-03 PROCEDURE — 83036 HEMOGLOBIN GLYCOSYLATED A1C: CPT | Performed by: FAMILY MEDICINE

## 2022-06-03 PROCEDURE — 1111F DSCHRG MED/CURRENT MED MERGE: CPT | Performed by: FAMILY MEDICINE

## 2022-06-03 PROCEDURE — 1036F TOBACCO NON-USER: CPT | Performed by: FAMILY MEDICINE

## 2022-06-03 PROCEDURE — G8417 CALC BMI ABV UP PARAM F/U: HCPCS | Performed by: FAMILY MEDICINE

## 2022-06-03 PROCEDURE — 81002 URINALYSIS NONAUTO W/O SCOPE: CPT | Performed by: FAMILY MEDICINE

## 2022-06-03 ASSESSMENT — PATIENT HEALTH QUESTIONNAIRE - PHQ9
SUM OF ALL RESPONSES TO PHQ QUESTIONS 1-9: 0
SUM OF ALL RESPONSES TO PHQ QUESTIONS 1-9: 0
2. FEELING DOWN, DEPRESSED OR HOPELESS: 0
SUM OF ALL RESPONSES TO PHQ QUESTIONS 1-9: 0
SUM OF ALL RESPONSES TO PHQ9 QUESTIONS 1 & 2: 0
SUM OF ALL RESPONSES TO PHQ QUESTIONS 1-9: 0
1. LITTLE INTEREST OR PLEASURE IN DOING THINGS: 0

## 2022-06-03 NOTE — PROGRESS NOTES
Post-Discharge Transitional Care Management Progress Note      Allegra Baumann   YOB: 1935    Date of Office Visit:  6/3/2022  Date of Hospital Admission:5/5/22  Date of Hospital Discharge: 5/20/22    Care management risk score Rising risk (score 2-5) and Complex Care (Scores >=6): 2     Non face to face  following discharge, date last encounter closed (first attempt may have been earlier): 5/26/2022  2:53 PM 5/26/2022  2:53 PM    Call initiated 2 business days of discharge: No    ASSESSMENT/PLAN:   Coronary artery disease involving native coronary artery of native heart without angina pectoris  -     Chano Ferreira MD, Cardiology, [facility]  History of UTI  -     VT DISCHARGE MEDS RECONCILED W/ CURRENT OUTPATIENT MED LIST  -     POCT Urinalysis no Micro  Type 2 diabetes mellitus without complication, without long-term current use of insulin (HCC)  -     POCT glycosylated hemoglobin (Hb A1C)  -     VT DISCHARGE MEDS RECONCILED W/ CURRENT OUTPATIENT MED LIST  Mixed hyperlipidemia  -     CBC with Auto Differential; Future  -     Comprehensive Metabolic Panel; Future  -     Vitamin B12 & Folate; Future  -     TSH with Reflex; Future  Vitamin D deficiency  -     Vitamin D 25 Hydroxy; Future  Hospital discharge follow-up  -     VT DISCHARGE MEDS RECONCILED W/ CURRENT OUTPATIENT MED LIST      Medical Decision Making: moderate complexity  No follow-ups on file. On this date 6/3/2022 I have spent 30 minutes reviewing previous notes, test results and face to face with the patient discussing the diagnosis and importance of compliance with the treatment plan as well as documenting on the day of the visit. Doing well. The patient was initially admitted to [facility] but the catheterization could not be completed and she was transferred to [facility].  She had a stroke during the catheterization. She is doing well now.   No major residual symptoms. No chest pain or shortness of breath. Mild leg swelling which is stable. Her EF improved to 50% from 30%. She cannot tolerate statins. She is on Zetia currently. History of UTI that was treated. No residual UTI symptoms. Subjective:   HPI:  Follow up of Hospital problems/diagnosis(es):     CAD  Type 2 diabetes   History of UTI  CVA    Inpatient course: Discharge summary reviewed- see chart. 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 If you have any questions, ask your nurse or doctor. CONTINUE taking these medications    allopurinol 100 MG tablet  Commonly known as: ZYLOPRIM     aspirin 81 MG EC tablet  Take 1 tablet by mouth daily Indications: OTC     atorvastatin 40 MG tablet  Commonly known as: LIPITOR  Take 1 tablet by mouth daily     * blood glucose test strips  1 strip by Does not apply route three times daily Patient requesting True Results brand     * Accu-Chek Daphne Plus strip  Generic drug: blood glucose test strips  USE TO TEST BLOOD SUGAR ONCE DAILY     * blood glucose test strips  Test once daily for irregular blood sugars.      carvedilol 6.25 MG tablet  Commonly known as: COREG  Take 1 tablet by mouth 2 times daily (with meals)     CENTRUM ADULTS PO     * glucose monitoring kit  1 kit by Does not apply route daily as needed (not prn) Dx E11.9-uses once daily-needs Acucheck Daphne Plus     * blood glucose monitor kit and supplies  1 kit by Other route once for 1 dose Dispense sufficient amount for indicated testing frequency plus additional to accommodate PRN testing needs. Dispense all needed supplies to include: monitor, strips, lancing device, lancets, control solutions, alcohol swabs. Insurance preference  Diabetes. * blood glucose monitor kit and supplies  Indications: Diabetes, e11.9 Dispense sufficient amount for indicated testing frequency plus additional to accommodate PRN testing needs. Dispense all needed supplies to include: monitor, strips, lancing device, lancets, control solutions, alcohol swabs. Handicap Placard Misc  11/9/21-11/8/26     Jardiance 10 MG tablet  Generic drug: empagliflozin  Take 1 tablet by mouth daily     Lancets Misc  1 each by Does not apply route daily Please fill what is covered by the patient's insurance. metFORMIN 500 MG tablet  Commonly known as: GLUCOPHAGE  TAKE 1 TABLET EVERY DAY     sacubitril-valsartan 24-26 MG per tablet  Commonly known as: ENTRESTO  Take 1 tablet by mouth 2 times daily     spironolactone 25 MG tablet  Commonly known as: ALDACTONE  Take 1 tablet by mouth daily         * This list has 6 medication(s) that are the same as other medications prescribed for you. Read the directions carefully, and ask your doctor or other care provider to review them with you. Medications marked \"taking\" at this time  No outpatient medications have been marked as taking for the 6/3/22 encounter (Office Visit) with Jibmo Simpson DO.        Medications patient taking as of now reconciled against medications ordered at time of hospital discharge: Yes    A comprehensive review of systems was negative except for what was noted in the HPI.     Objective:    /70 Pulse 91   Wt 194 lb (88 kg)   SpO2 96%   BMI 34.37 kg/m²   General Appearance: alert and oriented to person, place and time, well developed and well- nourished, in no acute distress  Skin: warm and dry, no rash or erythema  Head: normocephalic and atraumatic  Neck: supple and non-tender without mass, no thyromegaly or thyroid nodules, no cervical lymphadenopathy  Pulmonary/Chest: clear to auscultation bilaterally- no wheezes, rales or rhonchi, normal air movement, no respiratory distress  Cardiovascular: normal rate, regular rhythm, normal S1 and S2, no murmurs, rubs, clicks, or gallops, distal pulses intact, no carotid bruits  Abdomen: soft, non-tender, non-distended, normal bowel sounds, no masses or organomegaly  Extremities: no cyanosis, clubbing or edema  Musculoskeletal: normal range of motion, no joint swelling, deformity or tenderness  Neurologic: reflexes normal and symmetric, no cranial nerve deficit, gait, coordination and speech normal    An electronic signature was used to authenticate this note.   --Danae Carson DO

## 2022-06-06 DIAGNOSIS — I10 ESSENTIAL HYPERTENSION: ICD-10-CM

## 2022-06-06 NOTE — TELEPHONE ENCOUNTER
Refill Request     CONFIRM preferrred pharmacy with the patient. If Mail Order Rx - Pend for 90 day refill.       Last Seen: Last Seen Department: 6/3/2022  Last Seen by PCP: 6/29/2021    Last Written: metoprolol   Metformin 9/16/2021 90 with 1   empagliflozin 3/8/2022 30 with 1   sacubitril 3/8/2022 60 with 3   Allopurinol        Next Appointment:   Future Appointments   Date Time Provider Carmencita Jean-Baptiste   10/10/2022  1:30 PM DO TYLER Argueta  [place] [facility]       Future appointment scheduled      Requested Prescriptions     Pending Prescriptions Disp Refills    allopurinol (ZYLOPRIM) 100 MG tablet 30 tablet 1     Sig: Take 1 tablet by mouth daily    sacubitril-valsartan (ENTRESTO) 24-26 MG per tablet 60 tablet 3     Sig: Take 1 tablet by mouth 2 times daily    empagliflozin (JARDIANCE) 10 MG tablet 30 tablet 1     Sig: Take 1 tablet by mouth daily    metFORMIN (GLUCOPHAGE) 500 MG tablet 90 tablet 1    metoprolol succinate (TOPROL XL) 50 MG extended release tablet 90 tablet 1     Sig: Take 1 tablet by mouth daily

## 2022-06-07 RX ORDER — ALLOPURINOL 100 MG/1
100 TABLET ORAL DAILY
Qty: 90 TABLET | Refills: 1 | Status: SHIPPED | OUTPATIENT
Start: 2022-06-07 | End: 2022-06-17 | Stop reason: SDUPTHER

## 2022-06-07 RX ORDER — METOPROLOL SUCCINATE 50 MG/1
50 TABLET, EXTENDED RELEASE ORAL DAILY
Qty: 90 TABLET | Refills: 0 | Status: SHIPPED | OUTPATIENT
Start: 2022-06-07 | End: 2022-06-17

## 2022-06-09 ENCOUNTER — TELEPHONE (OUTPATIENT)
Dept: FAMILY MEDICINE CLINIC | Age: 87
End: 2022-06-09

## 2022-06-09 RX ORDER — EZETIMIBE 10 MG/1
10 TABLET ORAL DAILY
Qty: 90 TABLET | Refills: 1 | Status: SHIPPED | OUTPATIENT
Start: 2022-06-09 | End: 2022-06-17 | Stop reason: SDUPTHER

## 2022-06-09 RX ORDER — CLOPIDOGREL BISULFATE 75 MG/1
75 TABLET ORAL DAILY
Qty: 90 TABLET | Refills: 1 | Status: SHIPPED | OUTPATIENT
Start: 2022-06-09 | End: 2022-06-17 | Stop reason: SDUPTHER

## 2022-06-09 NOTE — TELEPHONE ENCOUNTER
Refill Request     Patients Daughter Roxy Pillai that Patient was in the hospital 05/2022 and upon Discharge was given the following medications but patient is needing a refill now. Writer can see these meds on the Facility-Administered Medication History list but system won't let me pend the medications in this encounter.    Patient needs : Ezetimibe (Zetia) 10Mg tablet take 1                                tablet daily                             Clopidogrel (Plavix) 75mg tablet take 1                            Tablet daily     Both Prescriptions may be sent to [address] located in [facility]   Thanks, Please Advise     Last Seen: Last Seen Department: 6/3/2022  Last Seen by PCP: 6/29/2021    Last Written: from History 05/21/22    Next Appointment:   Future Appointments   Date Time Provider Carmencita Jean-Baptiste   10/10/2022  1:30 PM DO TYLER Argueta       Future appointment scheduled

## 2022-06-15 ENCOUNTER — TELEPHONE (OUTPATIENT)
Dept: FAMILY MEDICINE CLINIC | Age: 87
End: 2022-06-15

## 2022-06-16 DIAGNOSIS — I10 ESSENTIAL HYPERTENSION: ICD-10-CM

## 2022-06-16 RX ORDER — EZETIMIBE 10 MG/1
10 TABLET ORAL DAILY
Qty: 90 TABLET | Refills: 1 | OUTPATIENT
Start: 2022-06-16

## 2022-06-16 RX ORDER — CLOPIDOGREL BISULFATE 75 MG/1
75 TABLET ORAL DAILY
Qty: 90 TABLET | Refills: 1 | OUTPATIENT
Start: 2022-06-16

## 2022-06-16 RX ORDER — ALLOPURINOL 100 MG/1
100 TABLET ORAL DAILY
Qty: 90 TABLET | Refills: 1 | OUTPATIENT
Start: 2022-06-16

## 2022-06-16 NOTE — TELEPHONE ENCOUNTER
All medications have been pended to Mail order pharmacy, except for the Metoprolol. Patient states it should be 25MG, not 50MG.

## 2022-06-16 NOTE — TELEPHONE ENCOUNTER
Yes, the patient did pick these up at [facility] recently however she wanted to have future prescriptions sent through the mail and needed the corrections for the two medications as noted below

## 2022-06-16 NOTE — TELEPHONE ENCOUNTER
Patient's daughter came in to request all medications be sent as mail-in-  She did get them this month from [facility] but will need mail-in for future refills  Also has corrections on empagliflozin and metoprolol (see below)    allopurinol (ZYLOPRIM) 100 MG tablet    clopidogrel (PLAVIX) 75 MG tablet     ezetimibe (ZETIA) 10 mg tablet     empagliflozin (JARDIANCE) 10 MG tablet-was only 30 day supply needs to be 90 day supply    metoprolol succinate (TOPROL XL) 50 MG extended release tablet -should be 25 MG

## 2022-06-16 NOTE — TELEPHONE ENCOUNTER
Yes, the patient did pick these up at [facility] recently however she wanted to have future prescriptions sent through the mail and needed the corrections for the two medications as noted below

## 2022-06-17 RX ORDER — METOPROLOL SUCCINATE 25 MG/1
25 TABLET, EXTENDED RELEASE ORAL DAILY
Qty: 90 TABLET | Refills: 3 | Status: SHIPPED | OUTPATIENT
Start: 2022-06-17

## 2022-06-17 RX ORDER — EZETIMIBE 10 MG/1
10 TABLET ORAL DAILY
Qty: 90 TABLET | Refills: 3 | Status: SHIPPED | OUTPATIENT
Start: 2022-06-17

## 2022-06-17 RX ORDER — ALLOPURINOL 100 MG/1
100 TABLET ORAL DAILY
Qty: 90 TABLET | Refills: 3 | Status: SHIPPED | OUTPATIENT
Start: 2022-06-17

## 2022-06-17 RX ORDER — CLOPIDOGREL BISULFATE 75 MG/1
75 TABLET ORAL DAILY
Qty: 90 TABLET | Refills: 3 | Status: SHIPPED | OUTPATIENT
Start: 2022-06-17

## 2022-08-12 ENCOUNTER — TELEPHONE (OUTPATIENT)
Dept: FAMILY MEDICINE CLINIC | Age: 87
End: 2022-08-12

## 2022-08-18 ENCOUNTER — TELEPHONE (OUTPATIENT)
Dept: FAMILY MEDICINE CLINIC | Age: 87
End: 2022-08-18

## 2022-08-18 NOTE — TELEPHONE ENCOUNTER
Spoke with Amparo Wood and let her know Dr. Sebastian Huitron has this form in his folder to sign. I will have it back Monday and will fax.

## 2022-08-18 NOTE — TELEPHONE ENCOUNTER
Patients daughter calling in to see if we received the paperwork from [employer] for her jardiance. I do see it was scanned in on the 16th. Has this been signed by  yet? Pt daughter asking for an update. Please advise.

## 2022-08-23 ENCOUNTER — TELEPHONE (OUTPATIENT)
Dept: FAMILY MEDICINE CLINIC | Age: 87
End: 2022-08-23

## 2022-08-23 NOTE — TELEPHONE ENCOUNTER
FYI - Pt daughter in law called regarding paperwork sent to [employer]. States they have not received anything. She asked that we send to alternate fax number [phone]. I have sent fax. Please contact Scott Perkins with any questions at [phone].

## 2022-08-23 NOTE — TELEPHONE ENCOUNTER
Pt daughter in law called stating [employer] requested paperwork be sent to alternate fax number [phone]. I faxed information in media scanned on 8/18.

## 2022-08-23 NOTE — TELEPHONE ENCOUNTER
Spoke with Jessica Ramos to ensure this is the correct fax number- Jessica Ramos stated that she apologizes, because the company keeps giving her different fax number to sent the form too. We have faxed it to [employer] and will send a copy to Jessica Ramos as well on her fax machine.

## 2022-08-24 NOTE — TELEPHONE ENCOUNTER
Thanks for your help on this you two! Yes I have been going back and forth with this paperwork for several days. 5

## 2022-09-07 ENCOUNTER — TELEPHONE (OUTPATIENT)
Dept: FAMILY MEDICINE CLINIC | Age: 87
End: 2022-09-07

## 2022-09-07 NOTE — TELEPHONE ENCOUNTER
[facility] Transitions Initial Follow Up Call    Outreach made within 2 business days of discharge: Yes    Patient: Alma Nguyen Patient : 1935   MRN: [id number]  Reason for Admission: There are no discharge diagnoses documented for the most recent discharge. Discharge Date: 3/8/22       Spoke with attempted to contact pt LVM to make HFU/TCM visit. Discharge department/facility: [facility]    Non-face-to-face services provided:  Obtained and reviewed discharge summary and/or continuity of care documents    TCM Interactive Patient Contact:  Was patient able to fill all prescriptions: Yes  Was patient instructed to bring all medications to the follow-up visit: Yes  Is patient taking all medications as directed in the discharge summary?  Yes  Does patient understand their discharge instructions: Yes  Does patient have questions or concerns that need addressed prior to 7-14 day follow up office visit: no    Scheduled appointment with PCP within 7-14 days    Follow Up  Future Appointments   Date Time Provider Carmencita Jean-Baptiste   10/10/2022  1:30 PM DO Peggy [address]       Ashley Sales LPN

## 2022-09-08 NOTE — TELEPHONE ENCOUNTER
[facility] Transitions Initial Follow Up Call    Outreach made within 2 business days of discharge: Yes    Patient: Bety Locke Patient : 1935   MRN: [id number]  Reason for Admission: There are no discharge diagnoses documented for the most recent discharge. Discharge Date: 3/8/22       Spoke with: PT she would not like to make a HFU/TCM appointment at this time. Discharge department/facility: 2022    Non-face-to-face services provided:  Obtained and reviewed discharge summary and/or continuity of care documents    TCM Interactive Patient Contact:  Was patient able to fill all prescriptions: Yes  Was patient instructed to bring all medications to the follow-up visit: Yes  Is patient taking all medications as directed in the discharge summary?  Yes  Does patient understand their discharge instructions: Yes  Does patient have questions or concerns that need addressed prior to 7-14 day follow up office visit: no    Scheduled appointment with PCP within 7-14 days    Follow Up  Future Appointments   Date Time Provider Carmencita Jean-Baptiste   10/10/2022  1:30 PM DO Peggy [address]       Castillo Mackey LPN

## 2022-10-10 ENCOUNTER — OFFICE VISIT (OUTPATIENT)
Dept: FAMILY MEDICINE CLINIC | Age: 87
End: 2022-10-10
Payer: MEDICARE

## 2022-10-10 VITALS
BODY MASS INDEX: 31.81 KG/M2 | SYSTOLIC BLOOD PRESSURE: 110 MMHG | WEIGHT: 179.6 LBS | HEART RATE: 91 BPM | DIASTOLIC BLOOD PRESSURE: 60 MMHG | OXYGEN SATURATION: 97 %

## 2022-10-10 DIAGNOSIS — I25.10 CORONARY ARTERY DISEASE INVOLVING NATIVE CORONARY ARTERY OF NATIVE HEART WITHOUT ANGINA PECTORIS: ICD-10-CM

## 2022-10-10 DIAGNOSIS — I10 ESSENTIAL HYPERTENSION: ICD-10-CM

## 2022-10-10 DIAGNOSIS — I51.9 SYSTOLIC DYSFUNCTION: ICD-10-CM

## 2022-10-10 DIAGNOSIS — E11.9 TYPE 2 DIABETES MELLITUS WITHOUT COMPLICATION, WITHOUT LONG-TERM CURRENT USE OF INSULIN (HCC): Primary | ICD-10-CM

## 2022-10-10 LAB — HBA1C MFR BLD: 5.6 %

## 2022-10-10 PROCEDURE — G8427 DOCREV CUR MEDS BY ELIG CLIN: HCPCS | Performed by: FAMILY MEDICINE

## 2022-10-10 PROCEDURE — 99214 OFFICE O/P EST MOD 30 MIN: CPT | Performed by: FAMILY MEDICINE

## 2022-10-10 PROCEDURE — G8417 CALC BMI ABV UP PARAM F/U: HCPCS | Performed by: FAMILY MEDICINE

## 2022-10-10 PROCEDURE — 83036 HEMOGLOBIN GLYCOSYLATED A1C: CPT | Performed by: FAMILY MEDICINE

## 2022-10-10 PROCEDURE — 1036F TOBACCO NON-USER: CPT | Performed by: FAMILY MEDICINE

## 2022-10-10 PROCEDURE — 3044F HG A1C LEVEL LT 7.0%: CPT | Performed by: FAMILY MEDICINE

## 2022-10-10 PROCEDURE — G8484 FLU IMMUNIZE NO ADMIN: HCPCS | Performed by: FAMILY MEDICINE

## 2022-10-10 PROCEDURE — 1123F ACP DISCUSS/DSCN MKR DOCD: CPT | Performed by: FAMILY MEDICINE

## 2022-10-10 PROCEDURE — 1090F PRES/ABSN URINE INCON ASSESS: CPT | Performed by: FAMILY MEDICINE

## 2022-10-10 ASSESSMENT — ENCOUNTER SYMPTOMS
SORE THROAT: 0
EYE REDNESS: 0
BACK PAIN: 0
SHORTNESS OF BREATH: 1
EYE ITCHING: 0
ANAL BLEEDING: 0
ABDOMINAL DISTENTION: 0
VOMITING: 0
EYE DISCHARGE: 0
DIARRHEA: 0
SINUS PRESSURE: 0
EYE PAIN: 0
ABDOMINAL PAIN: 0
BLOOD IN STOOL: 0
WHEEZING: 0
RHINORRHEA: 0
CONSTIPATION: 0
VOICE CHANGE: 0
COUGH: 0
CHEST TIGHTNESS: 0
NAUSEA: 0
TROUBLE SWALLOWING: 0

## 2022-10-10 NOTE — PROGRESS NOTES
Subjective:      Patient ID: Pau Khan is a 81 y.o. female. HPI  Patient in for checkup on several medical issues. Overall she is doing fairly well and the family has been taking care of her at her house for the last 6 months or so and she would like to be on her own. She has had coronary artery disease, systolic heart failure and a CVA. She is currently using a walker in the office and she uses it at home. She states she has had numbness in her feet for quite a while and its only during the day and does not appear to be a major problem but very annoying. Review of Systems    Review of Systems   Constitutional:  Negative for fatigue and unexpected weight change. HENT:  Negative for congestion, ear pain, hearing loss, nosebleeds, postnasal drip, rhinorrhea, sinus pressure, sneezing, sore throat, tinnitus, trouble swallowing and voice change. Eyes:  Negative for pain, discharge, redness, itching and visual disturbance. Respiratory:  Positive for shortness of breath. Negative for cough, chest tightness and wheezing. Cardiovascular:  Negative for chest pain, palpitations and leg swelling. Gastrointestinal:  Negative for abdominal distention, abdominal pain, anal bleeding, blood in stool, constipation, diarrhea, nausea and vomiting. Genitourinary:  Positive for frequency. Negative for dysuria, flank pain, hematuria, menstrual problem, pelvic pain, urgency and vaginal discharge. Musculoskeletal:  Positive for arthralgias. Negative for back pain, gait problem, joint swelling, myalgias and neck pain. Skin:  Negative for pallor and rash. Neurological:  Positive for numbness. Negative for dizziness, tremors, seizures, weakness, light-headedness and headaches. See hpi   Hematological:  Negative for adenopathy. Does not bruise/bleed easily. Psychiatric/Behavioral:  Negative for agitation, confusion, decreased concentration and sleep disturbance.  The patient is not nervous/anxious and is not hyperactive. Objective:   Physical Exam      Physical Exam  Constitutional:       General: She is not in acute distress. Appearance: Normal appearance. She is well-developed. She is obese. She is not ill-appearing. HENT:      Head: Normocephalic. Mouth/Throat:      Mouth: Mucous membranes are moist.      Pharynx: Oropharynx is clear. Eyes:      General: No scleral icterus. Conjunctiva/sclera: Conjunctivae normal.   Neck:      Thyroid: No thyromegaly. Vascular: No carotid bruit. Cardiovascular:      Rate and Rhythm: Normal rate and regular rhythm. Heart sounds: Normal heart sounds. Pulmonary:      Effort: Pulmonary effort is normal.      Breath sounds: Normal breath sounds. Abdominal:      General: Bowel sounds are normal. There is no distension. Palpations: Abdomen is soft. There is no mass. Tenderness: There is no abdominal tenderness. Musculoskeletal:         General: No tenderness. Cervical back: Neck supple. Right lower leg: No edema. Left lower leg: No edema. Lymphadenopathy:      Cervical: No cervical adenopathy. Skin:     General: Skin is warm and dry. Coloration: Skin is not pale. Neurological:      Mental Status: She is alert and oriented to person, place, and time. Motor: No abnormal muscle tone. Gait: Gait normal.   Psychiatric:         Mood and Affect: Mood normal.         Behavior: Behavior normal.         Thought Content: Thought content normal.         Judgment: Judgment normal.       Assessment:       Diagnosis Orders   1. Type 2 diabetes mellitus without complication, without long-term current use of insulin ([facility])  POCT glycosylated hemoglobin (Hb A1C)      2. Essential hypertension        3. Coronary artery disease involving native coronary artery of native heart without angina pectoris        4. Systolic dysfunction              Plan:      Peña Morales was seen today for diabetes.     Diagnoses and all orders for this visit:    Type 2 diabetes mellitus without complication, without long-term current use of insulin ([facility])  -     POCT glycosylated hemoglobin (Hb A1C)  A1c 5.6 and the prior A1c was 6.1-continue medications-work on slow weight loss by reducing carbs and increasing activity as tolerated. Notify me of any persistent elevation of blood sugar  Essential hypertension  Continue medications and no added salt diet-limit caffeine and preferably no alcohol. Notify me of any persistent elevation of blood pressure. Coronary artery disease involving native coronary artery of native heart without angina pectoris  Continue medications and visits with the cardiologist when scheduled. Systolic dysfunction  As above. Hospital records labs x-rays consults were all reviewed due to her CVA and coronary artery disease.   Gilbert Santos, DO

## 2022-11-29 LAB — LEFT VENTRICULAR EJECTION FRACTION, EXTERNAL: 56

## 2022-12-13 ENCOUNTER — TELEPHONE (OUTPATIENT)
Dept: FAMILY MEDICINE CLINIC | Age: 87
End: 2022-12-13

## 2022-12-13 NOTE — TELEPHONE ENCOUNTER
Patient's daughter dropped off patient assistance forms to be filled out for Jardiance. Will scan and route the forms, original placed in your mailbox on 12/13/22. Telephone Call regarding Forms    If the patient has had recent visit with the provider AND discussed the forms during this visit then NO appointment is necessary. Please advise patient that forms require an appointment to be scheduled so that patient and provider can review the necessary documentation together. Type of Form:  Other  Reason for the form/medical condition:       MUST BE COMPLETED FOR WORK EXCUSE, SUNITHA OR FMLA  First Day out of work - Not Applicable  Anticipated Return to Work Date - Not Applicable    Forms to be sent to:  [facility]  Patient [email or web address] [place]) [phone]  Contact Name: Patient     Date needed: soon as possible    Appointment scheduled:  No    Future Appointments   Date Time Provider Carmencita Jean-Baptiste   4/10/2023  1:00 PM DO TYLER Argueta [place] - [place]       Please consult list of providers who do NOT provide forms for [employer].

## 2022-12-15 NOTE — TELEPHONE ENCOUNTER
Called patient x 2 and no answer. Just wanted to let her know her Januvia assistance paperwork is complete and I faxed it.

## 2023-05-23 ENCOUNTER — TELEPHONE (OUTPATIENT)
Dept: FAMILY MEDICINE CLINIC | Age: 88
End: 2023-05-23

## 2023-05-23 NOTE — TELEPHONE ENCOUNTER
----- Message from Kelsy Torre sent at 5/23/2023  3:16 PM EDT -----  Subject: Appointment Request    Reason for Call: Established Patient Appointment needed: Routine Existing   Condition Follow Up    QUESTIONS    Reason for appointment request? Available appointments did not meet   patient need     Additional Information for Provider? Patients daughter had called to   schedule the patient a follow up wellness appointment with Dr. Arianna Layton for   July or August. The next available appointment was September 27th.  Please   call to schedule.   ---------------------------------------------------------------------------  --------------  Karlynn Lanes INFO  [phone]; OK to leave message on voicemail  ---------------------------------------------------------------------------  --------------  SCRIPT ANSWERS  COVID Screen: Leana Monreal

## 2023-06-11 DIAGNOSIS — M15.9 PRIMARY OSTEOARTHRITIS INVOLVING MULTIPLE JOINTS: ICD-10-CM

## 2023-06-11 DIAGNOSIS — E78.2 MIXED HYPERLIPIDEMIA: Primary | ICD-10-CM

## 2023-06-11 RX ORDER — CLOPIDOGREL BISULFATE 75 MG/1
TABLET ORAL
Qty: 90 TABLET | Refills: 1 | Status: SHIPPED | OUTPATIENT
Start: 2023-06-11

## 2023-06-11 RX ORDER — ALLOPURINOL 100 MG/1
TABLET ORAL
Qty: 90 TABLET | Refills: 1 | Status: SHIPPED | OUTPATIENT
Start: 2023-06-11

## 2023-06-11 RX ORDER — EZETIMIBE 10 MG/1
TABLET ORAL
Qty: 90 TABLET | Refills: 1 | Status: SHIPPED | OUTPATIENT
Start: 2023-06-11

## 2023-06-11 NOTE — TELEPHONE ENCOUNTER
Refill Request     CONFIRM preferred pharmacy with the patient. If Mail Order Rx - Pend for 90 day refill. Last Seen: Last Seen Department: 10/10/2022  Last Seen by PCP: 10/10/2022    Last Written: 6/17/2022 #90 with 3 refills for all three     If no future appointment scheduled:  Review the last OV with PCP and review information for follow-up visit,  Route STAFF MESSAGE with patient name to the [facility] for scheduling with the following information:            -  Timing of next visit           -  Visit type ie Physical, OV, etc           -  Diagnoses/Reason ie. COPD, HTN - Do not use MEDICATION, Follow-up or CHECK UP - Give reason for visit      Next Appointment:   Future Appointments   Date Time Provider [facility] [address]   10/16/2023 11:00 AM SCHEDULE, LUIS MANUEL SOMMERS [place] - D[place]D       Message sent to [address] [place] [address] to schedule appt with patient?   YES      Requested Prescriptions     Pending Prescriptions Disp Refills    clopidogrel (PLAVIX) 75 MG tablet [Pharmacy Med Name: CLOPIDOGREL 75 MG Tablet] 90 tablet 3     Sig: TAKE 1 TABLET EVERY DAY    ezetimibe (ZETIA) 10 MG tablet [Pharmacy Med Name: EZETIMIBE 10 MG Tablet] 90 tablet 3     Sig: TAKE 1 TABLET EVERY DAY    allopurinol (ZYLOPRIM) 100 MG tablet [Pharmacy Med Name: ALLOPURINOL 100 MG Tablet] 90 tablet 3     Sig: TAKE 1 TABLET EVERY DAY

## 2023-08-18 ENCOUNTER — OFFICE VISIT (OUTPATIENT)
Dept: FAMILY MEDICINE CLINIC | Age: 88
End: 2023-08-18

## 2023-08-18 VITALS
WEIGHT: 183 LBS | RESPIRATION RATE: 18 BRPM | OXYGEN SATURATION: 96 % | HEART RATE: 89 BPM | BODY MASS INDEX: 32.42 KG/M2 | DIASTOLIC BLOOD PRESSURE: 88 MMHG | SYSTOLIC BLOOD PRESSURE: 132 MMHG

## 2023-08-18 DIAGNOSIS — L30.9 ECZEMA, UNSPECIFIED TYPE: ICD-10-CM

## 2023-08-18 DIAGNOSIS — L03.119 CELLULITIS OF LOWER EXTREMITY, UNSPECIFIED LATERALITY: Primary | ICD-10-CM

## 2023-08-18 RX ORDER — HYDROXYZINE HYDROCHLORIDE 25 MG/1
25 TABLET, FILM COATED ORAL EVERY 8 HOURS PRN
Qty: 30 TABLET | Refills: 0 | Status: SHIPPED | OUTPATIENT
Start: 2023-08-18 | End: 2023-08-28

## 2023-08-18 RX ORDER — METHYLPREDNISOLONE 4 MG/1
TABLET ORAL
Qty: 1 KIT | Refills: 0 | Status: SHIPPED | OUTPATIENT
Start: 2023-08-18 | End: 2023-08-24

## 2023-08-18 RX ORDER — SULFAMETHOXAZOLE AND TRIMETHOPRIM 800; 160 MG/1; MG/1
1 TABLET ORAL 2 TIMES DAILY
Qty: 14 TABLET | Refills: 0 | Status: SHIPPED | OUTPATIENT
Start: 2023-08-18 | End: 2023-08-25

## 2023-08-18 ASSESSMENT — PATIENT HEALTH QUESTIONNAIRE - PHQ9
SUM OF ALL RESPONSES TO PHQ QUESTIONS 1-9: 0
SUM OF ALL RESPONSES TO PHQ QUESTIONS 1-9: 0
1. LITTLE INTEREST OR PLEASURE IN DOING THINGS: 0
2. FEELING DOWN, DEPRESSED OR HOPELESS: 0
SUM OF ALL RESPONSES TO PHQ QUESTIONS 1-9: 0
SUM OF ALL RESPONSES TO PHQ9 QUESTIONS 1 & 2: 0
SUM OF ALL RESPONSES TO PHQ QUESTIONS 1-9: 0

## 2023-08-18 NOTE — PROGRESS NOTES
Avtar Lopes (:  1935) is a 81 y.o. female,Established patient, here for evaluation of the following chief complaint(s):  Leg Swelling (Bilateral leg swelling, red, warm, and painful to touch x couple months off and on)         ASSESSMENT/PLAN:  1. Cellulitis of lower extremity, unspecified laterality  -     sulfamethoxazole-trimethoprim (BACTRIM DS) 800-160 MG per tablet; Take 1 tablet by mouth 2 times daily for 7 days, Disp-14 tablet, R-0Normal  -     hydrOXYzine HCl (ATARAX) 25 MG tablet; Take 1 tablet by mouth every 8 hours as needed for Itching, Disp-30 tablet, R-0Normal  2. Eczema, unspecified type  -     triamcinolone (KENALOG) 0.1 % ointment; Apply topically 2 times daily for 7 days Apply small amount 2 times daily to affected areas, Topical, 2 TIMES DAILY Starting 2023, Until 2023, For 7 days, Disp-80 g, R-0, Normal  -     methylPREDNISolone (MEDROL, RADHA,) 4 MG tablet; As directed, Disp-1 kit, R-0Normal    Apply lotion 2 times daily to arms. Avoid applying to legs at this time given cellulitis    Recommend wearing white gloves at home to avoid scratching. Return in about 10 days (around 2023) for cellulitis. Subjective   SUBJECTIVE/OBJECTIVE:  HPI    Seen today related to legs itching and scratching them . Thinks this episode started 1 week ago however  to office with her reports going on for  months. Applying something OTC for itching. Review of Systems   All other systems reviewed and are negative. Objective   Physical Exam  Constitutional:       Appearance: Normal appearance. HENT:      Ears:      Comments: Very [place]  Cardiovascular:      Rate and Rhythm: Normal rate. Heart sounds: Normal heart sounds. Pulmonary:      Effort: Pulmonary effort is normal.   Musculoskeletal:         General: Swelling present. Skin:     Comments: Bilateral lateral lower legs with large amount of irritation, redness, open areas, scabbed areas.

## 2023-08-21 ENCOUNTER — TELEPHONE (OUTPATIENT)
Dept: FAMILY MEDICINE CLINIC | Age: 88
End: 2023-08-21

## 2023-08-21 NOTE — TELEPHONE ENCOUNTER
They were advised to use it on the itching area on her arms, not to use on the open areas on her legs.

## 2023-08-21 NOTE — TELEPHONE ENCOUNTER
Patient's daughter, Danielle Ibanez, called the office, she is confused about when to start the Kenalog ointment. She states she was told not to use it on Fe's leg until the cellulitis is better, but the tube states to start it on 8/18/23. Danielle Ibanez would like to confirm when she should start using the ointment, thanks.

## 2023-08-28 ENCOUNTER — OFFICE VISIT (OUTPATIENT)
Dept: FAMILY MEDICINE CLINIC | Age: 88
End: 2023-08-28
Payer: MEDICARE

## 2023-08-28 VITALS
HEART RATE: 77 BPM | WEIGHT: 179.6 LBS | DIASTOLIC BLOOD PRESSURE: 68 MMHG | OXYGEN SATURATION: 96 % | BODY MASS INDEX: 31.81 KG/M2 | SYSTOLIC BLOOD PRESSURE: 108 MMHG | RESPIRATION RATE: 16 BRPM

## 2023-08-28 DIAGNOSIS — L30.9 ECZEMA, UNSPECIFIED TYPE: Primary | ICD-10-CM

## 2023-08-28 PROCEDURE — 1090F PRES/ABSN URINE INCON ASSESS: CPT | Performed by: NURSE PRACTITIONER

## 2023-08-28 PROCEDURE — 99213 OFFICE O/P EST LOW 20 MIN: CPT | Performed by: NURSE PRACTITIONER

## 2023-08-28 PROCEDURE — G8417 CALC BMI ABV UP PARAM F/U: HCPCS | Performed by: NURSE PRACTITIONER

## 2023-08-28 PROCEDURE — 1123F ACP DISCUSS/DSCN MKR DOCD: CPT | Performed by: NURSE PRACTITIONER

## 2023-08-28 PROCEDURE — 1036F TOBACCO NON-USER: CPT | Performed by: NURSE PRACTITIONER

## 2023-08-28 PROCEDURE — G8427 DOCREV CUR MEDS BY ELIG CLIN: HCPCS | Performed by: NURSE PRACTITIONER

## 2023-08-28 RX ORDER — BETAMETHASONE DIPROPIONATE 0.5 MG/G
CREAM TOPICAL 2 TIMES DAILY
Qty: 50 G | Refills: 1 | Status: SHIPPED | OUTPATIENT
Start: 2023-08-28

## 2023-08-28 NOTE — PROGRESS NOTES
Luisa Billings (:  1935) is a 81 y.o. female,Established patient, here for evaluation of the following chief complaint(s):  Cellulitis (Bilateral lower legs, L arm and was on R arm. )         ASSESSMENT/PLAN:  1. Eczema, unspecified type  -     augmented betamethasone dipropionate (DIPROLENE-AF) 0.05 % cream; Apply topically 2 times daily Apply topically 2 times daily. , Topical, 2 TIMES DAILY Starting 2023, Disp-50 g, R-1, Normal    Apply diprolene to legs and arms 2 times daily, small amount. After absorbed can then apply cereve or eucerin to arms and legs for moisture. Return if no improvement or worsens    Continue hydroxyzine nightly for itching. No follow-ups on file. Subjective   SUBJECTIVE/OBJECTIVE:  HPI      For follow up for cellulitis bilateral lower legs. Feels is doing much better. Completed antibiotic Friday or Saturday. Has only taken hydroxyzine nightly. Concerned about the roughness of the skin on her legs. Accompanied to office by a daughter. They both think the symptoms have occurred over the past 2-3 months. Review of Systems   All other systems reviewed and are negative. Objective   Physical Exam  Constitutional:       Appearance: Normal appearance. HENT:      Ears:      Comments: Very hard of hearing. Cardiovascular:      Rate and Rhythm: Normal rate. Heart sounds: Normal heart sounds. Pulmonary:      Effort: Pulmonary effort is normal.   Musculoskeletal:         General: No swelling. Skin:     Comments: Bilateral lower legs are improved. Continues with healing scratches. Mild pinkness. Skin very thickened, plaque noted. Bilateral arms very dry, small plaque noted there as well. Neg for scratching at this time. Neurological:      Mental Status: She is alert and oriented to person, place, and time.    Psychiatric:         Behavior: Behavior normal.                An electronic signature was used to authenticate this

## 2023-09-06 DIAGNOSIS — L30.9 ECZEMA, UNSPECIFIED TYPE: ICD-10-CM

## 2023-09-06 RX ORDER — BETAMETHASONE DIPROPIONATE 0.5 MG/G
CREAM TOPICAL 2 TIMES DAILY
Qty: 50 G | Refills: 1 | OUTPATIENT
Start: 2023-09-06

## 2023-09-06 NOTE — TELEPHONE ENCOUNTER
Patients Daughter Jason Rudolph ([address]) called in stating that Patient would like the cream to go through the mail in pharmacy. She will need it within the next week for a refill. Patients daughter stated that the [facility] pharmacy is charging too much. BETAMETHAZONE Cream    Please call patient daughter with any issues at   [phone]    Refill Request     CONFIRM preferred pharmacy with the patient. If Mail Order Rx - Pend for 90 day refill. Last Seen: Last Seen Department: 8/28/2023  Last Seen by PCP: 10/10/2022    Last Written: 08/28/2023    If no future appointment scheduled:  Review the last OV with PCP and review information for follow-up visit,  Route STAFF MESSAGE with patient name to the [facility] for scheduling with the following information:            -  Timing of next visit           -  Visit type ie Physical, OV, etc           -  Diagnoses/Reason ie. COPD, HTN - Do not use MEDICATION, Follow-up or CHECK UP - Give reason for visit      Next Appointment:   Future Appointments   Date Time Provider [address] [place] [address]   10/16/2023 11:00 AM SCHEDULE, LUIS MANUEL SOMMERS [place] - [place]       Message sent to [facility] to schedule appt with patient? NO      Requested Prescriptions     Pending Prescriptions Disp Refills    augmented betamethasone dipropionate (DIPROLENE-AF) 0.05 % cream 50 g 1     Sig: Apply topically 2 times daily Apply topically 2 times daily.

## 2023-09-12 RX ORDER — BETAMETHASONE DIPROPIONATE 0.5 MG/G
CREAM TOPICAL 2 TIMES DAILY
Qty: 50 G | Refills: 1 | Status: SHIPPED | OUTPATIENT
Start: 2023-09-12

## 2023-09-12 NOTE — TELEPHONE ENCOUNTER
Patient's daughter called the office, she wants the cream to be sent to the mail order pharmacy as requested, this will save the patient money.

## 2023-10-16 ENCOUNTER — OFFICE VISIT (OUTPATIENT)
Dept: FAMILY MEDICINE CLINIC | Age: 88
End: 2023-10-16
Payer: MEDICARE

## 2023-10-16 VITALS
SYSTOLIC BLOOD PRESSURE: 132 MMHG | RESPIRATION RATE: 18 BRPM | DIASTOLIC BLOOD PRESSURE: 86 MMHG | OXYGEN SATURATION: 98 % | WEIGHT: 185 LBS | HEART RATE: 74 BPM | BODY MASS INDEX: 32.78 KG/M2 | HEIGHT: 63 IN

## 2023-10-16 DIAGNOSIS — Z00.00 MEDICARE ANNUAL WELLNESS VISIT, SUBSEQUENT: Primary | ICD-10-CM

## 2023-10-16 PROCEDURE — 1123F ACP DISCUSS/DSCN MKR DOCD: CPT | Performed by: FAMILY MEDICINE

## 2023-10-16 PROCEDURE — 90694 VACC AIIV4 NO PRSRV 0.5ML IM: CPT | Performed by: FAMILY MEDICINE

## 2023-10-16 PROCEDURE — G0439 PPPS, SUBSEQ VISIT: HCPCS | Performed by: FAMILY MEDICINE

## 2023-10-16 PROCEDURE — G0008 ADMIN INFLUENZA VIRUS VAC: HCPCS | Performed by: FAMILY MEDICINE

## 2023-10-16 PROCEDURE — G8484 FLU IMMUNIZE NO ADMIN: HCPCS | Performed by: FAMILY MEDICINE

## 2023-10-16 SDOH — ECONOMIC STABILITY: FOOD INSECURITY: WITHIN THE PAST 12 MONTHS, THE FOOD YOU BOUGHT JUST DIDN'T LAST AND YOU DIDN'T HAVE MONEY TO GET MORE.: NEVER TRUE

## 2023-10-16 SDOH — ECONOMIC STABILITY: FOOD INSECURITY: WITHIN THE PAST 12 MONTHS, YOU WORRIED THAT YOUR FOOD WOULD RUN OUT BEFORE YOU GOT MONEY TO BUY MORE.: NEVER TRUE

## 2023-10-16 SDOH — ECONOMIC STABILITY: INCOME INSECURITY: HOW HARD IS IT FOR YOU TO PAY FOR THE VERY BASICS LIKE FOOD, HOUSING, MEDICAL CARE, AND HEATING?: NOT HARD AT ALL

## 2023-10-16 ASSESSMENT — PATIENT HEALTH QUESTIONNAIRE - PHQ9
SUM OF ALL RESPONSES TO PHQ9 QUESTIONS 1 & 2: 0
SUM OF ALL RESPONSES TO PHQ QUESTIONS 1-9: 0
2. FEELING DOWN, DEPRESSED OR HOPELESS: 0
SUM OF ALL RESPONSES TO PHQ QUESTIONS 1-9: 0
1. LITTLE INTEREST OR PLEASURE IN DOING THINGS: 0

## 2023-10-16 ASSESSMENT — LIFESTYLE VARIABLES
HOW OFTEN DO YOU HAVE A DRINK CONTAINING ALCOHOL: NEVER
HOW MANY STANDARD DRINKS CONTAINING ALCOHOL DO YOU HAVE ON A TYPICAL DAY: PATIENT DOES NOT DRINK

## 2023-10-20 ENCOUNTER — TELEPHONE (OUTPATIENT)
Dept: FAMILY MEDICINE CLINIC | Age: 88
End: 2023-10-20

## 2023-10-20 NOTE — TELEPHONE ENCOUNTER
[facility] thank you! You will have to reprint on Monday when you're back in office.

## 2023-10-20 NOTE — TELEPHONE ENCOUNTER
Florian Mari, are you able to write a script for Jardiance for patient? I got a fax from her patient assistance company for this ([id number] [facility]) stating they can't process request until they get the actual script sent for it too. Thanks!

## 2023-10-23 NOTE — TELEPHONE ENCOUNTER
Spoke with patient and let her know Jardiance Rx and paperwork were just refaxed to [facility] assistance program and medication should be on it's way to her.

## 2023-10-23 NOTE — TELEPHONE ENCOUNTER
Daughter called to follow up on the paperwork for her mother's Jardiance. I informed her that it will be faxed today.

## 2023-10-25 ENCOUNTER — TELEPHONE (OUTPATIENT)
Dept: FAMILY MEDICINE CLINIC | Age: 88
End: 2023-10-25

## 2023-10-25 NOTE — TELEPHONE ENCOUNTER
Spoke with daughter Kerline Sterling (on HIPAA) and let her know I received a fax from the [employer] program (patient assistance program) regarding a denial to cover patient's Jardiance. Informed daughter that I did speak with [facility] and gave her information I received for the steps in retrieving a \"benefit letter\" from ffa[employer] and if she can get that then submit to program patient will hopefully be approved for the Jardiance coverage again. Also gave her fax and phone number to reach them.

## 2023-10-30 NOTE — TELEPHONE ENCOUNTER
Spoke with daughter Gurpreet Lord (on HIPAA) and let her know that we were able to get the Kirke Nip approved through the [facility] cares program now and all is good to go. They should be shipping that out to patient soon.

## 2024-01-17 DIAGNOSIS — E78.2 MIXED HYPERLIPIDEMIA: ICD-10-CM

## 2024-01-17 DIAGNOSIS — M15.9 PRIMARY OSTEOARTHRITIS INVOLVING MULTIPLE JOINTS: ICD-10-CM

## 2024-01-17 RX ORDER — CLOPIDOGREL BISULFATE 75 MG/1
TABLET ORAL
Qty: 90 TABLET | Refills: 3 | Status: SHIPPED | OUTPATIENT
Start: 2024-01-17

## 2024-01-17 RX ORDER — ALLOPURINOL 100 MG/1
TABLET ORAL
Qty: 90 TABLET | Refills: 3 | Status: SHIPPED | OUTPATIENT
Start: 2024-01-17

## 2024-01-17 RX ORDER — EZETIMIBE 10 MG/1
TABLET ORAL
Qty: 90 TABLET | Refills: 3 | Status: SHIPPED | OUTPATIENT
Start: 2024-01-17

## 2024-01-17 NOTE — TELEPHONE ENCOUNTER
Refill Request     CONFIRM preferred pharmacy with the patient.    If Mail Order Rx - Pend for 90 day refill.      Last Seen: Last Seen Department: 10/16/2023  Last Seen by PCP: 10/16/2023    Last Written:   Ezetimibe 6/11/23 #90 with 3 refills  Allopurinol 6/11/23 #90 with 1 refill  Clopidogrel 6/11/23 #90 with 1 refill    If no future appointment scheduled:  Review the last OV with PCP and review information for follow-up visit,  Route STAFF MESSAGE with patient name to the  [facility] for scheduling with the following information:            -  Timing of next visit           -  Visit type ie Physical, OV, etc           -  Diagnoses/Reason ie. COPD, HTN - Do not use MEDICATION, Follow-up or CHECK UP - Give reason for visit      Next Appointment:   Future Appointments   Date Time Provider Department Center   3/4/2024 11:00 AM Gilbert Santos DO [facility] [place] - [facility]       Message sent to  to schedule appt with patient?  NO      Requested Prescriptions     Pending Prescriptions Disp Refills    clopidogrel (PLAVIX) 75 MG tablet [Pharmacy Med Name: CLOPIDOGREL 75 MG Tablet] 90 tablet 3     Sig: TAKE 1 TABLET EVERY DAY    allopurinol (ZYLOPRIM) 100 MG tablet [Pharmacy Med Name: ALLOPURINOL 100 MG Tablet] 90 tablet 3     Sig: TAKE 1 TABLET EVERY DAY    ezetimibe (ZETIA) 10 MG tablet [Pharmacy Med Name: EZETIMIBE 10 MG Tablet] 90 tablet 3     Sig: TAKE 1 TABLET EVERY DAY

## 2024-01-19 ENCOUNTER — TELEPHONE (OUTPATIENT)
Dept: FAMILY MEDICINE CLINIC | Age: 89
End: 2024-01-19

## 2024-01-19 DIAGNOSIS — U07.1 COVID-19: Primary | ICD-10-CM

## 2024-01-19 RX ORDER — DEXTROMETHORPHAN HYDROBROMIDE AND PROMETHAZINE HYDROCHLORIDE 15; 6.25 MG/5ML; MG/5ML
5 SYRUP ORAL EVERY 6 HOURS PRN
Qty: 100 ML | Refills: 0 | Status: SHIPPED | OUTPATIENT
Start: 2024-01-19 | End: 2024-01-26

## 2024-01-19 NOTE — TELEPHONE ENCOUNTER
Patient's daughter called the office stating that her mother can't complete a VV, she doesn't have cell phone or computer. If an appt is needed, it would have to wait until tomorrow. Offered to schedule an appt for tomorrow, daughter declined and ask that something be sent in for a cough.

## 2024-01-19 NOTE — TELEPHONE ENCOUNTER
Patient daughter called stating patient tested positive for covid yesterday and is requesting a cough syrup be sent to the pharmacy [facility] in [place]. Please advise

## 2024-03-04 ENCOUNTER — OFFICE VISIT (OUTPATIENT)
Dept: FAMILY MEDICINE CLINIC | Age: 89
End: 2024-03-04
Payer: MEDICARE

## 2024-03-04 VITALS
HEART RATE: 84 BPM | SYSTOLIC BLOOD PRESSURE: 124 MMHG | DIASTOLIC BLOOD PRESSURE: 70 MMHG | OXYGEN SATURATION: 97 % | WEIGHT: 185 LBS | BODY MASS INDEX: 32.77 KG/M2

## 2024-03-04 VITALS
BODY MASS INDEX: 32.78 KG/M2 | WEIGHT: 185 LBS | RESPIRATION RATE: 16 BRPM | SYSTOLIC BLOOD PRESSURE: 124 MMHG | HEART RATE: 84 BPM | OXYGEN SATURATION: 97 % | HEIGHT: 63 IN | DIASTOLIC BLOOD PRESSURE: 70 MMHG

## 2024-03-04 DIAGNOSIS — Z00.00 MEDICARE ANNUAL WELLNESS VISIT, SUBSEQUENT: Primary | ICD-10-CM

## 2024-03-04 DIAGNOSIS — I27.20 PULMONARY HTN (HCC): ICD-10-CM

## 2024-03-04 DIAGNOSIS — E11.9 TYPE 2 DIABETES MELLITUS WITHOUT COMPLICATION, WITHOUT LONG-TERM CURRENT USE OF INSULIN (HCC): ICD-10-CM

## 2024-03-04 DIAGNOSIS — G62.9 NEUROPATHY: ICD-10-CM

## 2024-03-04 DIAGNOSIS — I10 ESSENTIAL HYPERTENSION: Primary | ICD-10-CM

## 2024-03-04 DIAGNOSIS — I25.5 ISCHEMIC CARDIOMYOPATHY: ICD-10-CM

## 2024-03-04 PROBLEM — E11.40 TYPE 2 DIABETES MELLITUS WITH DIABETIC NEUROPATHY (HCC): Status: ACTIVE | Noted: 2024-03-04

## 2024-03-04 LAB
ALBUMIN SERPL-MCNC: 4.2 G/DL (ref 3.4–5)
ALBUMIN/GLOB SERPL: 1.7 {RATIO} (ref 1.1–2.2)
ALP SERPL-CCNC: 114 U/L (ref 40–129)
ALT SERPL-CCNC: 15 U/L (ref 10–40)
ANION GAP SERPL CALCULATED.3IONS-SCNC: 11 MMOL/L (ref 3–16)
AST SERPL-CCNC: 22 U/L (ref 15–37)
BASOPHILS # BLD: 0.1 K/UL (ref 0–0.2)
BASOPHILS NFR BLD: 1.2 %
BILIRUB SERPL-MCNC: 0.3 MG/DL (ref 0–1)
BUN SERPL-MCNC: 22 MG/DL (ref 7–20)
CALCIUM SERPL-MCNC: 10.1 MG/DL (ref 8.3–10.6)
CHLORIDE SERPL-SCNC: 102 MMOL/L (ref 99–110)
CO2 SERPL-SCNC: 28 MMOL/L (ref 21–32)
CREAT SERPL-MCNC: 1.1 MG/DL (ref 0.6–1.2)
DEPRECATED RDW RBC AUTO: 15.6 % (ref 12.4–15.4)
EOSINOPHIL # BLD: 0.2 K/UL (ref 0–0.6)
EOSINOPHIL NFR BLD: 3 %
GFR SERPLBLD CREATININE-BSD FMLA CKD-EPI: 48 ML/MIN/{1.73_M2}
GLUCOSE SERPL-MCNC: 109 MG/DL (ref 70–99)
HCT VFR BLD AUTO: 39.1 % (ref 36–48)
HGB BLD-MCNC: 12.9 G/DL (ref 12–16)
LYMPHOCYTES # BLD: 1.2 K/UL (ref 1–5.1)
LYMPHOCYTES NFR BLD: 22.2 %
MCH RBC QN AUTO: 28 PG (ref 26–34)
MCHC RBC AUTO-ENTMCNC: 33.1 G/DL (ref 31–36)
MCV RBC AUTO: 84.8 FL (ref 80–100)
MONOCYTES # BLD: 0.5 K/UL (ref 0–1.3)
MONOCYTES NFR BLD: 8.3 %
NEUTROPHILS # BLD: 3.6 K/UL (ref 1.7–7.7)
NEUTROPHILS NFR BLD: 65.3 %
PLATELET # BLD AUTO: 251 K/UL (ref 135–450)
PMV BLD AUTO: 9.1 FL (ref 5–10.5)
POTASSIUM SERPL-SCNC: 4.3 MMOL/L (ref 3.5–5.1)
PROT SERPL-MCNC: 6.7 G/DL (ref 6.4–8.2)
RBC # BLD AUTO: 4.6 M/UL (ref 4–5.2)
SODIUM SERPL-SCNC: 141 MMOL/L (ref 136–145)
WBC # BLD AUTO: 5.5 K/UL (ref 4–11)

## 2024-03-04 PROCEDURE — G8417 CALC BMI ABV UP PARAM F/U: HCPCS | Performed by: FAMILY MEDICINE

## 2024-03-04 PROCEDURE — G8484 FLU IMMUNIZE NO ADMIN: HCPCS | Performed by: FAMILY MEDICINE

## 2024-03-04 PROCEDURE — 99214 OFFICE O/P EST MOD 30 MIN: CPT | Performed by: FAMILY MEDICINE

## 2024-03-04 PROCEDURE — G8427 DOCREV CUR MEDS BY ELIG CLIN: HCPCS | Performed by: FAMILY MEDICINE

## 2024-03-04 PROCEDURE — 3044F HG A1C LEVEL LT 7.0%: CPT | Performed by: FAMILY MEDICINE

## 2024-03-04 PROCEDURE — 1123F ACP DISCUSS/DSCN MKR DOCD: CPT | Performed by: FAMILY MEDICINE

## 2024-03-04 PROCEDURE — 1090F PRES/ABSN URINE INCON ASSESS: CPT | Performed by: FAMILY MEDICINE

## 2024-03-04 PROCEDURE — G0439 PPPS, SUBSEQ VISIT: HCPCS | Performed by: FAMILY MEDICINE

## 2024-03-04 PROCEDURE — 1036F TOBACCO NON-USER: CPT | Performed by: FAMILY MEDICINE

## 2024-03-04 RX ORDER — GABAPENTIN 100 MG/1
CAPSULE ORAL
Qty: 60 CAPSULE | Refills: 0 | Status: SHIPPED | OUTPATIENT
Start: 2024-03-04 | End: 2024-04-01

## 2024-03-04 ASSESSMENT — PATIENT HEALTH QUESTIONNAIRE - PHQ9
1. LITTLE INTEREST OR PLEASURE IN DOING THINGS: 0
2. FEELING DOWN, DEPRESSED OR HOPELESS: 0
SUM OF ALL RESPONSES TO PHQ QUESTIONS 1-9: 0
SUM OF ALL RESPONSES TO PHQ9 QUESTIONS 1 & 2: 0

## 2024-03-04 ASSESSMENT — LIFESTYLE VARIABLES
HOW MANY STANDARD DRINKS CONTAINING ALCOHOL DO YOU HAVE ON A TYPICAL DAY: PATIENT DOES NOT DRINK
HOW OFTEN DO YOU HAVE A DRINK CONTAINING ALCOHOL: NEVER

## 2024-03-04 ASSESSMENT — ENCOUNTER SYMPTOMS
NAUSEA: 0
DIARRHEA: 0
SINUS PRESSURE: 0
SORE THROAT: 0
ANAL BLEEDING: 0
ABDOMINAL DISTENTION: 0
BACK PAIN: 0
ABDOMINAL PAIN: 0
EYE DISCHARGE: 0
VOICE CHANGE: 0
RHINORRHEA: 0
SHORTNESS OF BREATH: 0
COUGH: 0
CHEST TIGHTNESS: 0
VOMITING: 0
CONSTIPATION: 0
TROUBLE SWALLOWING: 0
EYE REDNESS: 0
BLOOD IN STOOL: 0
EYE PAIN: 0
WHEEZING: 0
EYE ITCHING: 0

## 2024-03-04 NOTE — PATIENT INSTRUCTIONS
risk of heart disease by raising cholesterol levels.     Limit the amount of solid fat-butter, margarine, and shortening-you eat. Use olive, peanut, or canola oil when you cook. Bake, broil, and steam foods instead of frying them.     Eat a variety of fruit and vegetables every day. Dark green, deep orange, red, or yellow fruits and vegetables are especially good for you. Examples include spinach, carrots, peaches, and berries.     Foods high in fiber can reduce your cholesterol and provide important vitamins and minerals. High-fiber foods include whole-grain cereals and breads, oatmeal, beans, brown rice, citrus fruits, and apples.     Eat lean proteins. Heart-healthy proteins include seafood, lean meats and poultry, eggs, beans, peas, nuts, seeds, and soy products.     Limit drinks and foods with added sugar. These include candy, desserts, and soda pop.   Lifestyle changes    If your doctor recommends it, get more exercise. Walking is a good choice. Bit by bit, increase the amount you walk every day. Try for at least 30 minutes on most days of the week. You also may want to swim, bike, or do other activities.     Do not smoke. If you need help quitting, talk to your doctor about stop-smoking programs and medicines. These can increase your chances of quitting for good. Quitting smoking may be the most important step you can take to protect your heart. It is never too late to quit.     Limit alcohol to 2 drinks a day for men and 1 drink a day for women. Too much alcohol can cause health problems.     Manage other health problems such as diabetes, high blood pressure, and high cholesterol. If you think you may have a problem with alcohol or drug use, talk to your doctor.   Medicines    Take your medicines exactly as prescribed. Call your doctor if you think you are having a problem with your medicine.     If your doctor recommends aspirin, take the amount directed each day. Make sure you take aspirin and not another

## 2024-03-04 NOTE — PROGRESS NOTES
Comprehensive Metabolic Panel  Continue medications and no added salt diet-limit caffeine and preferably no alcohol-notify me of any persistent elevation of blood pressure or systolic blood pressure below 100 if having any lightheadedness or dizziness.  Type 2 diabetes mellitus without complication, without long-term current use of insulin (HCC)  -     CBC with Auto Differential  -     Comprehensive Metabolic Panel  -     Hemoglobin A1C  A1c today 5.5-continue medications-always be looking to monitor carbs to help keep blood sugars under control and possibly losing some weight slowly.  Notify me of any persistent elevation of blood sugars or any blood sugars below 100 causing weakness or shakiness.  Pulmonary HTN (HCC)  Continue visits with cardiology  Ischemic cardiomyopathy  Continue visits with cardiology  Neuropathy  Prescription sent for gabapentin 100 mg and call me in about a week with an update and if no problems with the medication we will gradually increase for down the road possibly need to try Lyrica.  Other orders  -     gabapentin (NEURONTIN) 100 MG capsule; 1 hs x 4 nights then 2 hs    Chart reviewed for medications, labs and any consultations.    See me 6 months        Gilbert Santos, DO

## 2024-03-04 NOTE — PROGRESS NOTES
Medicare Annual Wellness Visit    Fe Monroy is here for Medicare AWV    Assessment & Plan   Medicare annual wellness visit, subsequent  Recommendations for Preventive Services Due: see orders and patient instructions/AVS.  Recommended screening schedule for the next 5-10 years is provided to the patient in written form: see Patient Instructions/AVS.     No follow-ups on file.     Subjective       Patient's complete Health Risk Assessment and screening values have been reviewed and are found in Flowsheets. The following problems were reviewed today and where indicated follow up appointments were made and/or referrals ordered.    Positive Risk Factor Screenings with Interventions:    Fall Risk:  Do you feel unsteady or are you worried about falling? : (!) yes (using walker)  2 or more falls in past year?: no  Fall with injury in past year?: no     Interventions:    Reviewed medications, home hazards, visual acuity, and co-morbidities that can increase risk for falls  See AVS for additional education material             Activity, Diet, and Weight:  On average, how many days per week do you engage in moderate to strenuous exercise (like a brisk walk)?: 0 days  On average, how many minutes do you engage in exercise at this level?: 0 min    Do you eat balanced/healthy meals regularly?: Yes    Body mass index is 32.77 kg/m². (!) Abnormal      Inactivity Interventions:  See AVS for additional education material  Obesity Interventions:  See AVS for additional education material              Vision Screen:  Do you have difficulty driving, watching TV, or doing any of your daily activities because of your eyesight?: No  Have you had an eye exam within the past year?: (!) No  No results found.    Interventions:   Patient encouraged to make appointment with their eye specialist     ADL's:   Patient reports needing help with:  Select all that apply: (!) Transportation, Shopping, Food Preparation, Taking Medications

## 2024-03-05 LAB
EST. AVERAGE GLUCOSE BLD GHB EST-MCNC: 111.2 MG/DL
HBA1C MFR BLD: 5.5 %

## 2024-03-12 ENCOUNTER — TELEMEDICINE (OUTPATIENT)
Dept: PRIMARY CARE CLINIC | Age: 89
End: 2024-03-12
Payer: MEDICARE

## 2024-03-12 ENCOUNTER — NURSE ONLY (OUTPATIENT)
Dept: FAMILY MEDICINE CLINIC | Age: 89
End: 2024-03-12
Payer: MEDICARE

## 2024-03-12 DIAGNOSIS — R31.9 URINARY TRACT INFECTION WITH HEMATURIA, SITE UNSPECIFIED: Primary | ICD-10-CM

## 2024-03-12 DIAGNOSIS — N30.01 ACUTE CYSTITIS WITH HEMATURIA: Primary | ICD-10-CM

## 2024-03-12 DIAGNOSIS — N39.0 URINARY TRACT INFECTION WITH HEMATURIA, SITE UNSPECIFIED: Primary | ICD-10-CM

## 2024-03-12 LAB
BILIRUBIN, POC: NEGATIVE
BLOOD URINE, POC: NORMAL
CLARITY, POC: NORMAL
COLOR, POC: YELLOW
GLUCOSE URINE, POC: >=1000
KETONES, POC: NEGATIVE
LEUKOCYTE EST, POC: NORMAL
NITRITE, POC: NEGATIVE
PH, POC: 6
PROTEIN, POC: 30
SPECIFIC GRAVITY, POC: 1.02
UROBILINOGEN, POC: 0.2

## 2024-03-12 PROCEDURE — 1036F TOBACCO NON-USER: CPT | Performed by: NURSE PRACTITIONER

## 2024-03-12 PROCEDURE — G8417 CALC BMI ABV UP PARAM F/U: HCPCS | Performed by: NURSE PRACTITIONER

## 2024-03-12 PROCEDURE — G8484 FLU IMMUNIZE NO ADMIN: HCPCS | Performed by: NURSE PRACTITIONER

## 2024-03-12 PROCEDURE — 1123F ACP DISCUSS/DSCN MKR DOCD: CPT | Performed by: NURSE PRACTITIONER

## 2024-03-12 PROCEDURE — 1090F PRES/ABSN URINE INCON ASSESS: CPT | Performed by: NURSE PRACTITIONER

## 2024-03-12 PROCEDURE — 99213 OFFICE O/P EST LOW 20 MIN: CPT | Performed by: NURSE PRACTITIONER

## 2024-03-12 PROCEDURE — G8427 DOCREV CUR MEDS BY ELIG CLIN: HCPCS | Performed by: NURSE PRACTITIONER

## 2024-03-12 PROCEDURE — 81002 URINALYSIS NONAUTO W/O SCOPE: CPT | Performed by: NURSE PRACTITIONER

## 2024-03-12 RX ORDER — SULFAMETHOXAZOLE AND TRIMETHOPRIM 800; 160 MG/1; MG/1
1 TABLET ORAL 2 TIMES DAILY
Qty: 6 TABLET | Refills: 0 | Status: ON HOLD | OUTPATIENT
Start: 2024-03-12 | End: 2024-03-14

## 2024-03-12 ASSESSMENT — ENCOUNTER SYMPTOMS
NAUSEA: 0
VOMITING: 0
RESPIRATORY NEGATIVE: 1
BACK PAIN: 0
ABDOMINAL PAIN: 0
DIARRHEA: 0

## 2024-03-12 NOTE — PROGRESS NOTES
evaluated through a synchronous (real-time) audio-video encounter. The patient (or guardian if applicable) is aware that this is a billable service, which includes applicable co-pays. This Virtual Visit was conducted with patient's (and/or legal guardian's) consent. Patient identification was verified, and a caregiver was present when appropriate.   The patient was located at Home: [address] [place] [id number]  Provider was located at Other: HOME:FL         --POOL Espinal CNP on 3/12/2024 at 1:53 PM    An electronic signature was used to authenticate this note.

## 2024-03-12 NOTE — PATIENT INSTRUCTIONS
If no improvement/or worsening of condition, notify office for further follow up.  Drink plenty of fluids. (Limit caffeine, spicy foods, and alcohol).  Take medication as prescribed.   You make drink cranberry juice.  Take probiotics as directed.  May take Tylenol for fever.  Make sure to wipe from front to back.  Practice good hand hygiene.   Empty bladder as soon as you have the urge to do so.  Make sure to change depends and/or feminine products often.  Follow up with PCP in 3 days if not improving or sooner if worsening.  Please refer to educational handout with AVS.

## 2024-03-13 ENCOUNTER — APPOINTMENT (OUTPATIENT)
Dept: CT IMAGING | Age: 89
End: 2024-03-13
Payer: MEDICARE

## 2024-03-13 ENCOUNTER — HOSPITAL ENCOUNTER (INPATIENT)
Age: 89
LOS: 1 days | Discharge: HOME HEALTH | End: 2024-03-14
Attending: EMERGENCY MEDICINE | Admitting: INTERNAL MEDICINE
Payer: MEDICARE

## 2024-03-13 ENCOUNTER — APPOINTMENT (OUTPATIENT)
Dept: GENERAL RADIOLOGY | Age: 89
End: 2024-03-13
Payer: MEDICARE

## 2024-03-13 DIAGNOSIS — N30.01 ACUTE CYSTITIS WITH HEMATURIA: Primary | ICD-10-CM

## 2024-03-13 DIAGNOSIS — R53.1 GENERALIZED WEAKNESS: ICD-10-CM

## 2024-03-13 LAB
ALBUMIN SERPL-MCNC: 3.8 G/DL (ref 3.4–5)
ALBUMIN/GLOB SERPL: 1.1 {RATIO} (ref 1.1–2.2)
ALP SERPL-CCNC: 108 U/L (ref 40–129)
ALT SERPL-CCNC: 19 U/L (ref 10–40)
ANION GAP SERPL CALCULATED.3IONS-SCNC: 11 MMOL/L (ref 3–16)
AST SERPL-CCNC: 59 U/L (ref 15–37)
BACTERIA URNS QL MICRO: ABNORMAL /HPF
BASOPHILS # BLD: 0 K/UL (ref 0–0.2)
BASOPHILS NFR BLD: 0.4 %
BILIRUB SERPL-MCNC: 0.3 MG/DL (ref 0–1)
BILIRUB UR QL STRIP.AUTO: NEGATIVE
BUN SERPL-MCNC: 25 MG/DL (ref 7–20)
CALCIUM SERPL-MCNC: 10.2 MG/DL (ref 8.3–10.6)
CHLORIDE SERPL-SCNC: 101 MMOL/L (ref 99–110)
CLARITY UR: ABNORMAL
CO2 SERPL-SCNC: 24 MMOL/L (ref 21–32)
COLOR UR: YELLOW
CREAT SERPL-MCNC: 1.2 MG/DL (ref 0.6–1.2)
DEPRECATED RDW RBC AUTO: 15.6 % (ref 12.4–15.4)
EOSINOPHIL # BLD: 0.3 K/UL (ref 0–0.6)
EOSINOPHIL NFR BLD: 3.1 %
EPI CELLS #/AREA URNS HPF: ABNORMAL /HPF (ref 0–5)
GFR SERPLBLD CREATININE-BSD FMLA CKD-EPI: 44 ML/MIN/{1.73_M2}
GLUCOSE SERPL-MCNC: 108 MG/DL (ref 70–99)
GLUCOSE UR STRIP.AUTO-MCNC: >=1000 MG/DL
HCT VFR BLD AUTO: 41.5 % (ref 36–48)
HGB BLD-MCNC: 13.4 G/DL (ref 12–16)
HGB UR QL STRIP.AUTO: ABNORMAL
KETONES UR STRIP.AUTO-MCNC: NEGATIVE MG/DL
LACTATE BLDV-SCNC: 1.1 MMOL/L (ref 0.4–1.9)
LEUKOCYTE ESTERASE UR QL STRIP.AUTO: ABNORMAL
LYMPHOCYTES # BLD: 0.8 K/UL (ref 1–5.1)
LYMPHOCYTES NFR BLD: 8.6 %
MCH RBC QN AUTO: 27.8 PG (ref 26–34)
MCHC RBC AUTO-ENTMCNC: 32.2 G/DL (ref 31–36)
MCV RBC AUTO: 86.1 FL (ref 80–100)
MONOCYTES # BLD: 0.5 K/UL (ref 0–1.3)
MONOCYTES NFR BLD: 5.7 %
NEUTROPHILS # BLD: 7.4 K/UL (ref 1.7–7.7)
NEUTROPHILS NFR BLD: 82.2 %
NITRITE UR QL STRIP.AUTO: NEGATIVE
PH UR STRIP.AUTO: 6.5 [PH] (ref 5–8)
PLATELET # BLD AUTO: 269 K/UL (ref 135–450)
PMV BLD AUTO: 8.7 FL (ref 5–10.5)
POTASSIUM SERPL-SCNC: 4.2 MMOL/L (ref 3.5–5.1)
POTASSIUM SERPL-SCNC: 5.8 MMOL/L (ref 3.5–5.1)
PROT SERPL-MCNC: 7.3 G/DL (ref 6.4–8.2)
PROT UR STRIP.AUTO-MCNC: 30 MG/DL
RBC # BLD AUTO: 4.82 M/UL (ref 4–5.2)
RBC #/AREA URNS HPF: ABNORMAL /HPF (ref 0–4)
SODIUM SERPL-SCNC: 136 MMOL/L (ref 136–145)
SP GR UR STRIP.AUTO: 1.01 (ref 1–1.03)
TROPONIN, HIGH SENSITIVITY: 24 NG/L (ref 0–14)
TROPONIN, HIGH SENSITIVITY: 25 NG/L (ref 0–14)
UA COMPLETE W REFLEX CULTURE PNL UR: YES
UA DIPSTICK W REFLEX MICRO PNL UR: YES
URN SPEC COLLECT METH UR: ABNORMAL
UROBILINOGEN UR STRIP-ACNC: 0.2 E.U./DL
WBC # BLD AUTO: 9 K/UL (ref 4–11)
WBC #/AREA URNS HPF: ABNORMAL /HPF (ref 0–5)

## 2024-03-13 PROCEDURE — 70450 CT HEAD/BRAIN W/O DYE: CPT

## 2024-03-13 PROCEDURE — 87086 URINE CULTURE/COLONY COUNT: CPT

## 2024-03-13 PROCEDURE — 1200000000 HC SEMI PRIVATE

## 2024-03-13 PROCEDURE — 83605 ASSAY OF LACTIC ACID: CPT

## 2024-03-13 PROCEDURE — 85025 COMPLETE CBC W/AUTO DIFF WBC: CPT

## 2024-03-13 PROCEDURE — 2580000003 HC RX 258: Performed by: PHYSICIAN ASSISTANT

## 2024-03-13 PROCEDURE — 84132 ASSAY OF SERUM POTASSIUM: CPT

## 2024-03-13 PROCEDURE — 99285 EMERGENCY DEPT VISIT HI MDM: CPT

## 2024-03-13 PROCEDURE — 93005 ELECTROCARDIOGRAM TRACING: CPT | Performed by: PHYSICIAN ASSISTANT

## 2024-03-13 PROCEDURE — 71045 X-RAY EXAM CHEST 1 VIEW: CPT

## 2024-03-13 PROCEDURE — 81001 URINALYSIS AUTO W/SCOPE: CPT

## 2024-03-13 PROCEDURE — 6360000002 HC RX W HCPCS: Performed by: PHYSICIAN ASSISTANT

## 2024-03-13 PROCEDURE — 72125 CT NECK SPINE W/O DYE: CPT

## 2024-03-13 PROCEDURE — 96365 THER/PROPH/DIAG IV INF INIT: CPT

## 2024-03-13 PROCEDURE — 84484 ASSAY OF TROPONIN QUANT: CPT

## 2024-03-13 PROCEDURE — 80053 COMPREHEN METABOLIC PANEL: CPT

## 2024-03-13 RX ORDER — SODIUM CHLORIDE 9 MG/ML
INJECTION, SOLUTION INTRAVENOUS PRN
Status: DISCONTINUED | OUTPATIENT
Start: 2024-03-13 | End: 2024-03-14 | Stop reason: HOSPADM

## 2024-03-13 RX ORDER — ONDANSETRON 4 MG/1
4 TABLET, ORALLY DISINTEGRATING ORAL EVERY 8 HOURS PRN
Status: DISCONTINUED | OUTPATIENT
Start: 2024-03-13 | End: 2024-03-14 | Stop reason: HOSPADM

## 2024-03-13 RX ORDER — ONDANSETRON 2 MG/ML
4 INJECTION INTRAMUSCULAR; INTRAVENOUS EVERY 6 HOURS PRN
Status: DISCONTINUED | OUTPATIENT
Start: 2024-03-13 | End: 2024-03-14 | Stop reason: HOSPADM

## 2024-03-13 RX ORDER — ACETAMINOPHEN 650 MG/1
650 SUPPOSITORY RECTAL EVERY 6 HOURS PRN
Status: DISCONTINUED | OUTPATIENT
Start: 2024-03-13 | End: 2024-03-14 | Stop reason: HOSPADM

## 2024-03-13 RX ORDER — GABAPENTIN 100 MG/1
100 CAPSULE ORAL NIGHTLY
Status: DISCONTINUED | OUTPATIENT
Start: 2024-03-14 | End: 2024-03-14

## 2024-03-13 RX ORDER — ACETAMINOPHEN 325 MG/1
650 TABLET ORAL EVERY 6 HOURS PRN
Status: DISCONTINUED | OUTPATIENT
Start: 2024-03-13 | End: 2024-03-14 | Stop reason: HOSPADM

## 2024-03-13 RX ORDER — CLOPIDOGREL BISULFATE 75 MG/1
75 TABLET ORAL DAILY
Status: DISCONTINUED | OUTPATIENT
Start: 2024-03-14 | End: 2024-03-14 | Stop reason: HOSPADM

## 2024-03-13 RX ORDER — ENOXAPARIN SODIUM 100 MG/ML
40 INJECTION SUBCUTANEOUS DAILY
Status: DISCONTINUED | OUTPATIENT
Start: 2024-03-14 | End: 2024-03-14 | Stop reason: HOSPADM

## 2024-03-13 RX ORDER — SODIUM CHLORIDE 0.9 % (FLUSH) 0.9 %
5-40 SYRINGE (ML) INJECTION EVERY 12 HOURS SCHEDULED
Status: DISCONTINUED | OUTPATIENT
Start: 2024-03-14 | End: 2024-03-14 | Stop reason: HOSPADM

## 2024-03-13 RX ORDER — DEXTROSE MONOHYDRATE 100 MG/ML
INJECTION, SOLUTION INTRAVENOUS CONTINUOUS PRN
Status: DISCONTINUED | OUTPATIENT
Start: 2024-03-13 | End: 2024-03-14 | Stop reason: HOSPADM

## 2024-03-13 RX ORDER — SODIUM CHLORIDE 9 MG/ML
INJECTION, SOLUTION INTRAVENOUS CONTINUOUS
Status: ACTIVE | OUTPATIENT
Start: 2024-03-14 | End: 2024-03-14

## 2024-03-13 RX ORDER — POLYETHYLENE GLYCOL 3350 17 G/17G
17 POWDER, FOR SOLUTION ORAL DAILY PRN
Status: DISCONTINUED | OUTPATIENT
Start: 2024-03-13 | End: 2024-03-14 | Stop reason: HOSPADM

## 2024-03-13 RX ORDER — INSULIN LISPRO 100 [IU]/ML
0-4 INJECTION, SOLUTION INTRAVENOUS; SUBCUTANEOUS NIGHTLY
Status: DISCONTINUED | OUTPATIENT
Start: 2024-03-14 | End: 2024-03-14 | Stop reason: HOSPADM

## 2024-03-13 RX ORDER — SODIUM CHLORIDE 0.9 % (FLUSH) 0.9 %
5-40 SYRINGE (ML) INJECTION PRN
Status: DISCONTINUED | OUTPATIENT
Start: 2024-03-13 | End: 2024-03-14 | Stop reason: HOSPADM

## 2024-03-13 RX ORDER — METOPROLOL SUCCINATE 25 MG/1
25 TABLET, EXTENDED RELEASE ORAL DAILY
Status: DISCONTINUED | OUTPATIENT
Start: 2024-03-14 | End: 2024-03-14 | Stop reason: HOSPADM

## 2024-03-13 RX ORDER — EZETIMIBE 10 MG/1
10 TABLET ORAL DAILY
Status: DISCONTINUED | OUTPATIENT
Start: 2024-03-14 | End: 2024-03-14 | Stop reason: HOSPADM

## 2024-03-13 RX ORDER — INSULIN LISPRO 100 [IU]/ML
0-4 INJECTION, SOLUTION INTRAVENOUS; SUBCUTANEOUS
Status: DISCONTINUED | OUTPATIENT
Start: 2024-03-14 | End: 2024-03-14 | Stop reason: HOSPADM

## 2024-03-13 RX ADMIN — CEFTRIAXONE SODIUM 1000 MG: 1 INJECTION, POWDER, FOR SOLUTION INTRAMUSCULAR; INTRAVENOUS at 21:19

## 2024-03-13 ASSESSMENT — PAIN SCALES - GENERAL
PAINLEVEL_OUTOF10: 0
PAINLEVEL_OUTOF10: 0

## 2024-03-13 NOTE — ED PROVIDER NOTES
[facility]  ED     EMERGENCY DEPARTMENT ENCOUNTER     Location: [facility]  ED  3/13/2024  Note Started: 7:35 PM EDT 3/13/24      Patient Identification  Fe Monroy is a 88 y.o. female      HPI:Fe Monroy was evaluated in the Emergency Department for reported fall.  Family initially presents to the emergency department with reported fall.  No loss of consciousness reported.  Patient denies extremity injury.  However, much after arrival, family is concerned for potential slurred speech and facial drawl.. Although initial history and physical exam information was obtained by BERNIE/NPP/MD/ (who also dictated a record of this visit), I personally saw the patient and performed and made/approved the management plan and take responsibility for the patient management.      PHYSICAL EXAM:  Head: Normal cephalic/atraumatic.  Heart: Regular in rhythm.  Normal S1-S2.  Lungs: Clear to auscultation bilaterally.  No wheezes, rales, or rhonchi.  Abdomen: Soft.  Nontender.  Nondistended.  No rebound, or guarding.  Extremities: No gross deformity.  Neurologic: Muscle strength 5/5.  Sensation intact.  No slurred speech.  No pronator drift.    EKG Interpretation    EKG: Normal sinus rhythm with rate of 79.  Left axis deviation.  Otherwise normal intervals and durations.  Inferior Q waves present.  No previous EKG available for review.    Patient seen and evaluated.  Relevant records reviewed.  MDM:  Patient presents to the emergency department with reported fall.  Initial evaluation completed and imaging/workup initiated.  Family later expresses concern for potential slurred speech.  On further discussion, patient reports bilateral upper and lower extremity weakness without focalized deficit.  Patient was reevaluated by myself.  NIH of 0 currently.  I independently interpreted the following studies.  Patient's labs reveal UTI.  Antibiotics initiated.  Head CT without hemorrhage or

## 2024-03-13 NOTE — ED NOTES
While in the room drawing pts repeat troponin and K+, family asked how you can test for a TIA. Asked family if there was a reason they were asking that and they stated that when their brother got to the pts house, pt had some slurred speech. This was never reported to ED staff until this time. Notified JAMES Cortez.

## 2024-03-14 ENCOUNTER — APPOINTMENT (OUTPATIENT)
Dept: MRI IMAGING | Age: 89
End: 2024-03-14
Payer: MEDICARE

## 2024-03-14 ENCOUNTER — APPOINTMENT (OUTPATIENT)
Dept: VASCULAR LAB | Age: 89
End: 2024-03-14
Payer: MEDICARE

## 2024-03-14 VITALS
SYSTOLIC BLOOD PRESSURE: 155 MMHG | HEART RATE: 78 BPM | BODY MASS INDEX: 32.78 KG/M2 | OXYGEN SATURATION: 93 % | TEMPERATURE: 98 F | RESPIRATION RATE: 22 BRPM | WEIGHT: 185 LBS | HEIGHT: 63 IN | DIASTOLIC BLOOD PRESSURE: 92 MMHG

## 2024-03-14 LAB
ANION GAP SERPL CALCULATED.3IONS-SCNC: 8 MMOL/L (ref 3–16)
BACTERIA UR CULT: ABNORMAL
BACTERIA UR CULT: NORMAL
BASOPHILS # BLD: 0 K/UL (ref 0–0.2)
BASOPHILS NFR BLD: 0.6 %
BUN SERPL-MCNC: 23 MG/DL (ref 7–20)
CALCIUM SERPL-MCNC: 9.6 MG/DL (ref 8.3–10.6)
CHLORIDE SERPL-SCNC: 106 MMOL/L (ref 99–110)
CO2 SERPL-SCNC: 26 MMOL/L (ref 21–32)
CREAT SERPL-MCNC: 1.3 MG/DL (ref 0.6–1.2)
DEPRECATED RDW RBC AUTO: 15.1 % (ref 12.4–15.4)
EOSINOPHIL # BLD: 0.4 K/UL (ref 0–0.6)
EOSINOPHIL NFR BLD: 6.5 %
GFR SERPLBLD CREATININE-BSD FMLA CKD-EPI: 40 ML/MIN/{1.73_M2}
GLUCOSE BLD-MCNC: 102 MG/DL (ref 70–99)
GLUCOSE BLD-MCNC: 103 MG/DL (ref 70–99)
GLUCOSE BLD-MCNC: 130 MG/DL (ref 70–99)
GLUCOSE SERPL-MCNC: 83 MG/DL (ref 70–99)
HCT VFR BLD AUTO: 35.7 % (ref 36–48)
HGB BLD-MCNC: 11.7 G/DL (ref 12–16)
LYMPHOCYTES # BLD: 1.2 K/UL (ref 1–5.1)
LYMPHOCYTES NFR BLD: 19.9 %
MCH RBC QN AUTO: 28 PG (ref 26–34)
MCHC RBC AUTO-ENTMCNC: 32.7 G/DL (ref 31–36)
MCV RBC AUTO: 85.7 FL (ref 80–100)
MONOCYTES # BLD: 0.6 K/UL (ref 0–1.3)
MONOCYTES NFR BLD: 10.6 %
NEUTROPHILS # BLD: 3.8 K/UL (ref 1.7–7.7)
NEUTROPHILS NFR BLD: 62.4 %
ORGANISM: ABNORMAL
PERFORMED ON: ABNORMAL
PLATELET # BLD AUTO: 210 K/UL (ref 135–450)
PMV BLD AUTO: 8.7 FL (ref 5–10.5)
POTASSIUM SERPL-SCNC: 3.9 MMOL/L (ref 3.5–5.1)
RBC # BLD AUTO: 4.17 M/UL (ref 4–5.2)
SODIUM SERPL-SCNC: 140 MMOL/L (ref 136–145)
WBC # BLD AUTO: 6 K/UL (ref 4–11)

## 2024-03-14 PROCEDURE — 6370000000 HC RX 637 (ALT 250 FOR IP): Performed by: NURSE PRACTITIONER

## 2024-03-14 PROCEDURE — 80048 BASIC METABOLIC PNL TOTAL CA: CPT

## 2024-03-14 PROCEDURE — 6360000002 HC RX W HCPCS: Performed by: NURSE PRACTITIONER

## 2024-03-14 PROCEDURE — 85025 COMPLETE CBC W/AUTO DIFF WBC: CPT

## 2024-03-14 PROCEDURE — 97116 GAIT TRAINING THERAPY: CPT

## 2024-03-14 PROCEDURE — 97535 SELF CARE MNGMENT TRAINING: CPT

## 2024-03-14 PROCEDURE — 36415 COLL VENOUS BLD VENIPUNCTURE: CPT

## 2024-03-14 PROCEDURE — 97161 PT EVAL LOW COMPLEX 20 MIN: CPT

## 2024-03-14 PROCEDURE — 93880 EXTRACRANIAL BILAT STUDY: CPT

## 2024-03-14 PROCEDURE — 2580000003 HC RX 258: Performed by: NURSE PRACTITIONER

## 2024-03-14 PROCEDURE — 70551 MRI BRAIN STEM W/O DYE: CPT

## 2024-03-14 PROCEDURE — 97530 THERAPEUTIC ACTIVITIES: CPT

## 2024-03-14 PROCEDURE — 97166 OT EVAL MOD COMPLEX 45 MIN: CPT

## 2024-03-14 RX ORDER — GABAPENTIN 100 MG/1
200 CAPSULE ORAL NIGHTLY
Status: DISCONTINUED | OUTPATIENT
Start: 2024-03-14 | End: 2024-03-14 | Stop reason: HOSPADM

## 2024-03-14 RX ORDER — CEFUROXIME AXETIL 250 MG/1
250 TABLET ORAL 2 TIMES DAILY
Qty: 10 TABLET | Refills: 0 | Status: SHIPPED | OUTPATIENT
Start: 2024-03-14 | End: 2024-03-19

## 2024-03-14 RX ORDER — ALLOPURINOL 100 MG/1
100 TABLET ORAL DAILY
Status: DISCONTINUED | OUTPATIENT
Start: 2024-03-14 | End: 2024-03-14 | Stop reason: HOSPADM

## 2024-03-14 RX ADMIN — GABAPENTIN 200 MG: 100 CAPSULE ORAL at 00:20

## 2024-03-14 RX ADMIN — Medication 10 ML: at 08:46

## 2024-03-14 RX ADMIN — Medication 10 ML: at 00:11

## 2024-03-14 RX ADMIN — ENOXAPARIN SODIUM 40 MG: 100 INJECTION SUBCUTANEOUS at 08:45

## 2024-03-14 RX ADMIN — EZETIMIBE 10 MG: 10 TABLET ORAL at 08:45

## 2024-03-14 RX ADMIN — SODIUM CHLORIDE: 9 INJECTION, SOLUTION INTRAVENOUS at 00:12

## 2024-03-14 RX ADMIN — CLOPIDOGREL BISULFATE 75 MG: 75 TABLET ORAL at 08:45

## 2024-03-14 RX ADMIN — METOPROLOL SUCCINATE 25 MG: 25 TABLET, FILM COATED, EXTENDED RELEASE ORAL at 08:45

## 2024-03-14 ASSESSMENT — PAIN SCALES - GENERAL
PAINLEVEL_OUTOF10: 0
PAINLEVEL_OUTOF10: 0

## 2024-03-14 NOTE — ED NOTES
Pt given sandwich and something to drink after passing swallow screening. PA verbalized ok to eat and drink

## 2024-03-14 NOTE — PLAN OF CARE
Bed in lowest position, wheels locked, 2/4 side rails up, nonskid footwear on. Bed/ chair check alarm in place, call light within reach. Pt instructed to call out when needing assistance. Pt stated understanding.     Problem: Safety - Adult  Goal: Free from fall injury  Outcome: Progressing

## 2024-03-14 NOTE — PROGRESS NOTES
4 Eyes Skin Assessment     NAME:  Fe Monroy  YOB: 1935  MEDICAL RECORD NUMBER:  [id number]    The patient is being assessed for  Admission    I agree that at least one RN has performed a thorough Head to Toe Skin Assessment on the patient. ALL assessment sites listed below have been assessed.      Areas assessed by both nurses:    Head, Face, Ears, Shoulders, Back, Chest, Arms, Elbows, Hands, Sacrum. Buttock, Coccyx, Ischium, Legs. Feet and Heels, and Under Medical Devices         Does the Patient have a Wound? No noted wound(s)       Javier Prevention initiated by RN: No  Wound Care Orders initiated by RN: No    Pressure Injury (Stage 3,4, Unstageable, DTI, NWPT, and Complex wounds) if present, place Wound referral order by RN under : No    New Ostomies, if present place, Ostomy referral order under : No     Nurse 1 eSignature: Electronically signed by Lisa Giles RN on 3/14/24 at 3:41 AM EDT    **SHARE this note so that the co-signing nurse can place an eSignature**    Nurse 2 eSignature: Electronically signed by Jason Lo RN on 3/14/24 at 4:02 AM EDT

## 2024-03-14 NOTE — DISCHARGE SUMMARY
Discharge Summary    Name:  Fe Monroy /Age/Sex: 1935 (88 y.o. female)   Admit Date: 3/13/2024  Discharge Date: 3/14/24   MRN & CSN:  [id number] & [id number] Encounter Date and Time 3/14/24 11:57 AM EDT    Attending:  Ricci Hdz MD Discharging Provider: RICCI HDZ MD       Hospital Course:       \"88 y.o. female, with PMH of obesity, HTN, HLD, and DM, who presented to [facility] with generalized weakness and fall. History obtained from the patient and review of EMR.  The patient stated she was diagnosed with UTI on Monday by her PCP.  She was started on Bactrim.  Patient stated after taking her second dose of Bactrim she started to feel \"weird\".  However, she was unable to elaborate further on the feeling.  The patient stated today she noticed that she just was feeling generally weak in both arms and legs.  The patient's son is at the bedside and stated that he stopped by the patient's house on his way to work and the patient was not herself.  He stated she was extremely weak and slurring her words.  The son reported the patient had some difficulty getting off of her chair because of her weakness.  Her son reported that her slurring lasted roughly 30 to 45 minutes and resolved on its own.  He stated he went to work and his sister came and sat with the patient.  However, the sister called him and told him that shortly after he left the patient went to the bathroom and she fell forward getting off of the toilet. Due to the patients weakness, slurring and fall, she was taken to the emergency department for further evaluation.\"      Acute cystitis.  99.5 temp here, some tachycardia.  Group B strep seen on culture the day prior to admission.  Changed from bactrim to cefuroxime, will discharge with another 5 days PO.    Trop elevation due to demand ischemia from above.     Acute metabolic encephalopathy, manifesting as dysarthria.  Resolved.  CVA ruled out on MRI.  Carotid US OK.  No  03/13/2024 05:50 PM    RBCUA 0-2 03/13/2024 05:50 PM    BACTERIA Rare 03/13/2024 05:50 PM    CLARITYU SL CLOUDY 03/13/2024 05:50 PM    SPECGRAV 1.015 03/13/2024 05:50 PM    LEUKOCYTESUR TRACE 03/13/2024 05:50 PM    UROBILINOGEN 0.2 03/13/2024 05:50 PM    BILIRUBINUR Negative 03/13/2024 05:50 PM    BILIRUBINUR negative 03/12/2024 12:37 PM    BLOODU SMALL 03/13/2024 05:50 PM    GLUCOSEU >=1000 03/13/2024 05:50 PM    KETUA Negative 03/13/2024 05:50 PM     Urine Cultures:   Lab Results   Component Value Date/Time    LABURIN  03/12/2024 12:35 PM     75,000 CFU/ml  Susceptibility testing of penicillin and other beta lactams is  not necessary for beta hemolytic Streptococci since resistant  strains have not been identified. (CLSI M100)      LABURIN 50 MG 11/06/2014 10:51 AM     Blood Cultures: No results found for: \"BC\"  No results found for: \"BLOODCULT2\"  Organism:   Lab Results   Component Value Date/Time    ORG Strep agalactiae (Beta Strep Group B) 03/12/2024 12:35 PM         The patient expressed appropriate understanding of, and agreement with the discharge recommendations, medications, and plan.     Discharge condition: stable    Consults this admission:  IP CONSULT TO HOSPITALIST  IP CONSULT TO HOME CARE NEEDS    Time Spent Discharging patient 33 minutes    Electronically signed by RICCI ZEE MD on 3/14/2024 at 12:02 PM

## 2024-03-14 NOTE — PROGRESS NOTES
Occupational Therapy  Facility/Department: [facility] C5 - MED SURG/ORTHO  Occupational Therapy Initial Assessment and Treatment Note    Name: Fe Monroy  : 1935  MRN: [id number]  Date of Service: 3/14/2024    Discharge Recommendations:  24 hour supervision or assist, Home with Home health OT  OT Equipment Recommendations  Equipment Needed: No     If pt is unable to be seen after this session, please let this note serve as discharge summary.  Please see case management note for discharge disposition.  Thank you.    Patient Diagnosis(es): The primary encounter diagnosis was Acute cystitis with hematuria. A diagnosis of Generalized weakness was also pertinent to this visit.  Past Medical History:  has a past medical history of Hypertension, Mixed hyperlipidemia, and Type II or unspecified type diabetes mellitus without mention of complication, not stated as uncontrolled.  Past Surgical History:  has a past surgical history that includes lipoma resection and knee surgery (Right, 2016).       Assessment   Performance deficits / Impairments: Decreased functional mobility ;Decreased ADL status;Decreased strength;Decreased endurance;Decreased balance  Assessment: Pt was admitted to [facility] for fall and generalized weakness. At baseline, pt lives alone and is IND with ADLs and IADLs. Today, pt required SPV for bed mobility, SBA for functional transfers, and CGA progressing to SBA for functional mobility with RW. She performed grooming tasks in stance at sink with SBA. She is functioning below her baseline secondary to the above occupational performance deficits and will benefit from continued skilled acute OT services to maximize safety and IND with ADLs and mobility. Home with initial 24/7 SPV and HHOT is recommended at d/c.  Prognosis: Good  Decision Making: Medium Complexity  REQUIRES OT FOLLOW-UP: Yes  Activity Tolerance  Activity Tolerance: Patient Tolerated treatment well        Plan   Occupational Therapy  Tolerance  Activity Tolerance: Patient tolerated evaluation without incident;Patient tolerated treatment well          Vision  Vision: Impaired  Vision Exceptions: Wears glasses for reading  Hearing  Hearing: Exceptions to WFL  Hearing Exceptions: Hard of hearing/hearing concerns;Bilateral hearing aid    Cognition  Overall Cognitive Status: WFL  Orientation  Overall Orientation Status: Within Functional Limits  Orientation Level: Oriented X4                    Education Given To: Patient  Education Provided: Transfer Training;Role of Therapy;Plan of Care;ADL Adaptive Strategies;Fall Prevention Strategies  Education Method: Verbal  Barriers to Learning: None  Education Outcome: Verbalized understanding;Demonstrated understanding  Disease Specific Education: Pt educated on importance of OOB mobility, prevention of complications of bedrest, and general safety during hospitalization. Pt verbalized understanding      AM-PAC - ADL  AM-PAC Daily Activity - Inpatient   How much help is needed for putting on and taking off regular lower body clothing?: A Little  How much help is needed for bathing (which includes washing, rinsing, drying)?: A Little  How much help is needed for toileting (which includes using toilet, bedpan, or urinal)?: A Little  How much help is needed for putting on and taking off regular upper body clothing?: A Little  How much help is needed for taking care of personal grooming?: A Little  How much help for eating meals?: None  AM-PAC Inpatient Daily Activity Raw Score: 19  AM-PAC Inpatient ADL T-Scale Score : 40.22  ADL Inpatient CMS 0-100% Score: 42.8  ADL Inpatient CMS G-Code Modifier : CK      Goals  Short Term Goals  Time Frame for Short Term Goals: 1 week (3/21/24) unless otherwise noted  Short Term Goal 1: Pt will perform toilet transfer mod I with LRAD by 3/20  Short Term Goal 2: Pt will perform LB dressing IND  Short Term Goal 3: Pt will perform 1-2 grooming tasks in stance at sink  Short Term

## 2024-03-14 NOTE — PROGRESS NOTES
Pt discharged from facility. Medication received from pharmacy. IV removed w/o complication. Telebox return. Education provided to pt and family, questions answered.    Fawn Velásquez RN

## 2024-03-14 NOTE — PROGRESS NOTES
endurance  Patient Goals   Patient Goals : \"To go home\"       Education  Patient Education  Education Given To: Patient;Family (pt, son, and 2 daughters)  Education Provided: Role of Therapy;Plan of Care;Precautions;Transfer Training;Family Education;Equipment;Fall Prevention Strategies  Education Provided Comments: Pt and children educated on role of PT in hospital and current D/C recs; pt & family verbalize understanding.  Education Method: Demonstration;Verbal  Barriers to Learning: Hearing  Education Outcome: Verbalized understanding;Demonstrated understanding      Therapy Time   Individual Concurrent Group Co-treatment   Time In 0849         Time Out 0928         Minutes 39         Timed Code Treatment Minutes: 29 Minutes (10 minute eval + 29 minutes treatment)       Shannon Nunn, PT, DPT #[id number]    If pt is unable to be seen after this session, please let this note serve as discharge summary.  Please see case management note for discharge disposition.  Thank you.

## 2024-03-14 NOTE — ED PROVIDER NOTES
evidence of leukocytosis or acute anemia.  CMP hemolyzed potassium and LFTs renal function well-maintained at patient baseline with BUN of 25 and creatinine of 1.2.  Lactic is normal.  Troponin is slightly elevated at 24.  Repeat is stable at 25.  Repeat potassium is 4.2.  Urinalysis with significant glucose, small amount of blood and protein.  She does have 10-20 white blood cells on micro rare bacteria.  Patient is given Rocephin for her known urinary tract infection.  CT head and cervical spine showed no acute intracranial or cervical abnormality.  She was incidentally found to have a thyroid nodule.    Ultimately discussion was had with the patient and family regarding all lab and imaging results.  Given the patient's weakness and that she lives by herself family does not feel comfortable with her being discharged back home therefore I do think it is reasonable she be admitted to the hospitalist for PT/OT evaluation in the morning.  Patient and family in agreement treatment plan, shared decision-making was used and the hospitalist is consulted at this time.    Disposition Considerations (include 1 Tests not done, Shared Decision Making, Pt Expectation of Test or Tx.):   Results for orders placed or performed during the hospital encounter of 03/13/24   CBC with Auto Differential   Result Value Ref Range    WBC 9.0 4.0 - 11.0 K/uL    RBC 4.82 4.00 - 5.20 M/uL    Hemoglobin 13.4 12.0 - 16.0 g/dL    Hematocrit 41.5 36.0 - 48.0 %    MCV 86.1 80.0 - 100.0 fL    MCH 27.8 26.0 - 34.0 pg    MCHC 32.2 31.0 - 36.0 g/dL    RDW 15.6 (H) 12.4 - 15.4 %    Platelets 269 135 - 450 K/uL    MPV 8.7 5.0 - 10.5 fL    Neutrophils % 82.2 %    Lymphocytes % 8.6 %    Monocytes % 5.7 %    Eosinophils % 3.1 %    Basophils % 0.4 %    Neutrophils Absolute 7.4 1.7 - 7.7 K/uL    Lymphocytes Absolute 0.8 (L) 1.0 - 5.1 K/uL    Monocytes Absolute 0.5 0.0 - 1.3 K/uL    Eosinophils Absolute 0.3 0.0 - 0.6 K/uL    Basophils Absolute 0.0 0.0 - 0.2  70 degrees    R Axis -54 degrees    T Axis 59 degrees    Diagnosis       Normal sinus rhythmPossible Left atrial enlargementLeft axis deviationLow voltage QRSInferior infarct (cited on or before 02-MAR-2022)Abnormal ECGWhen compared with ECG of 02-MAR-2022 16:06,Left posterior fascicular block is no longer PresentBorderline criteria for Anterior infarct are no longer Present         I spoke with Dr. Yeh via perfectserve. We discussed the history, physical exam, diagnostics, as well as their emergency department course. Based upon that discussion, we've decided to admit Fe Monroy for further observation and evaluation of Fe Monroy's   1. Acute cystitis with hematuria    2. Generalized weakness    .          I am the Primary Clinician of Record.    FINAL IMPRESSION      1. Acute cystitis with hematuria    2. Generalized weakness          DISPOSITION/PLAN     DISPOSITION Decision To Admit 03/13/2024 07:26:31 PM      PATIENT REFERRED TO:  No follow-up provider specified.    DISCHARGE MEDICATIONS:  New Prescriptions    No medications on file       DISCONTINUED MEDICATIONS:  Discontinued Medications    AUGMENTED BETAMETHASONE DIPROPIONATE (DIPROLENE-AF) 0.05 % CREAM    Apply topically 2 times daily Apply topically 2 times daily.              (Please note that portions of this note were completed with a voice recognition program.  Efforts were made to edit the dictations but occasionally words are mis-transcribed.)    JAMES Lawrence (electronically signed)           Thalia Cortez PA  03/13/24 [id number]

## 2024-03-14 NOTE — H&P
Hospital Medicine History & Physical      Date of Admission: 3/13/2024    Date of Service:  Pt seen/examined on 3/13/2024     [x]Admitted to Inpatient with expected LOS greater than two midnights due to medical therapy.    []Placed in Observation status.    Chief Admission Complaint:  Generalized weakness and fall    Presenting Admission History:      88 y.o. female, with PMH of obesity, HTN, HLD, and DM, who presented to [facility] with generalized weakness and fall. History obtained from the patient and review of EMR.  The patient stated she was diagnosed with UTI on Monday by her PCP.  She was started on Bactrim.  Patient stated after taking her second dose of Bactrim she started to feel \"weird\".  However, she was unable to elaborate further on the feeling.  The patient stated today she noticed that she just was feeling generally weak in both arms and legs.  The patient's son is at the bedside and stated that he stopped by the patient's house on his way to work and the patient was not herself.  He stated she was extremely weak and slurring her words.  The son reported the patient had some difficulty getting off of her chair because of her weakness.  Her son reported that her slurring lasted roughly 30 to 45 minutes and resolved on its own.  He stated he went to work and his sister came and sat with the patient.  However, the sister called him and told him that shortly after he left the patient went to the bathroom and she fell forward getting off of the toilet. Due to the patients weakness, slurring and fall, she was taken to the emergency department for further evaluation. In the emergency department a CT head was obtained that revealed no acute intracranial abnormality.  CT cervical spine was obtained that revealed no acute cervical spine abnormality.  The patient's UA was positive for infection revealing trace leukocyte Estrace, 10-20 WBC and rare bacteria.  A urine culture was ordered in the  --    K 5.8* 4.2     --    CO2 24  --    BUN 25*  --    CREATININE 1.2  --    CALCIUM 10.2  --      Recent Labs     03/13/24  1750 03/13/24  1849   TROPHS 24* 25*     Recent Labs     03/13/24 1750   AST 59*   ALT 19   BILITOT 0.3   ALKPHOS 108     Thank you Gilbert Santos DO for the opportunity to be involved in this patient's care. If you have any questions or concerns please feel free to contact me at [phone].     Fawn Vidal, APRN - ANAY  ----------------Anticipated Dr. Mejia morales---------------------

## 2024-03-14 NOTE — ED NOTES
Pt resting in cot with family at the bedside. Pt has no complaints at this time. PT alert and oriented. Siderails up x2, call light within reach

## 2024-03-14 NOTE — DISCHARGE INSTR - COC
Continuity of Care Form    Patient Name: Fe Monroy   :  1935  MRN:  [id number]    Admit date:  3/13/2024  Discharge date:  ***    Code Status Order: Full Code   Advance Directives:     Admitting Physician:  Tom Yeh MD  PCP: Gilbert Santos DO    Discharging Nurse: ***  Discharging Hospital Unit/Room#: 0548/0548-01  Discharging Unit Phone Number: ***    Emergency Contact:   Extended Emergency Contact Information  Primary Emergency Contact: Naveen Monroy  [facility]           [place] [address] [facility]  Home Phone: [phone]  Mobile Phone: [phone]  Relation: Child  Secondary Emergency Contact: Jeanette Almonte  Address: DAUGH[place]           [place] [address] [facility]  Home Phone: [phone]  Mobile Phone: [phone]  Relation: Child    Past Surgical History:  Past Surgical History:   Procedure Laterality Date    KNEE SURGERY Right 2016    LIPOMA RESECTION         Immunization History:   Immunization History   Administered Date(s) Administered    COVID-19, PFIZER PURPLE top, DILUTE for use, (age 12 y+), 30mcg/0.3mL 2021, 2021, 10/18/2021    Influenza Virus Vaccine 10/12/2011    Influenza Whole 10/14/2015    Influenza, FLUAD, (age 65 y+), Adjuvanted, 0.5mL 10/12/2021, 10/16/2023    Influenza, FLUZONE (age 65 y+), High Dose, 0.7mL 2020, 10/22/2022    Influenza, High Dose (Fluzone 65 yrs and older) 10/14/2016, 10/03/2017, 10/22/2018, 10/21/2019    Pneumococcal, PCV-13, PREVNAR 13, (age 6w+), IM, 0.5mL 2020       Active Problems:  Patient Active Problem List   Diagnosis Code    Osteoarthritis M19.90    Mixed hyperlipidemia E78.2    Essential hypertension I10    Diabetes mellitus (HCC) E11.9    Vitamin D deficiency E55.9    Stress incontinence N39.3    Hypercalcemia E83.52    Shoulder impingement M25.819    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     Update Admission H&P: No change in H&P    PHYSICIAN SIGNATURE:  Electronically signed by RICCI ZEE MD on 3/14/24 at 12:01 PM EDT

## 2024-03-15 ENCOUNTER — CARE COORDINATION (OUTPATIENT)
Dept: CASE MANAGEMENT | Age: 89
End: 2024-03-15

## 2024-03-15 ENCOUNTER — TELEPHONE (OUTPATIENT)
Dept: FAMILY MEDICINE CLINIC | Age: 89
End: 2024-03-15

## 2024-03-15 LAB
EKG ATRIAL RATE: 79 BPM
EKG DIAGNOSIS: NORMAL
EKG P AXIS: 70 DEGREES
EKG P-R INTERVAL: 166 MS
EKG Q-T INTERVAL: 388 MS
EKG QRS DURATION: 86 MS
EKG QTC CALCULATION (BAZETT): 444 MS
EKG R AXIS: -54 DEGREES
EKG T AXIS: 59 DEGREES
EKG VENTRICULAR RATE: 79 BPM

## 2024-03-15 PROCEDURE — 93010 ELECTROCARDIOGRAM REPORT: CPT | Performed by: INTERNAL MEDICINE

## 2024-03-15 NOTE — TELEPHONE ENCOUNTER
Care Transitions Initial Follow Up Call    Outreach made within 2 business days of discharge: Yes    Patient: Fe Monroy Patient : 1935   MRN: [id number]  Reason for Admission: There are no discharge diagnoses documented for the most recent discharge.  Discharge Date: 3/14/24       Spoke with: called patient, no answer,. Lvm to schedule hfu    Discharge department/facility: [facility] Interactive Patient Contact:  Was patient able to fill all prescriptions: Yes  Was patient instructed to bring all medications to the follow-up visit: Yes  Is patient taking all medications as directed in the discharge summary? Yes  Does patient understand their discharge instructions: Yes  Does patient have questions or concerns that need addressed prior to 7-14 day follow up office visit: no    Scheduled appointment with PCP within 7-14 days    Follow Up  Future Appointments   Date Time Provider Department Center   2024 11:00 AM Gilbert Santos DO [facility] Alexandrea P Cooper, RN

## 2024-03-15 NOTE — CARE COORDINATION
Care Transitions Outreach Attempt    Call within 2 business days of discharge: Yes   Attempted to reach patient for transitions of care follow up. Unable to reach patient. LVM.    CTN contacted Counts include 234 beds at the [facility] liaison RUDY Solis and LVM to verify SOC.     Counts include 234 beds at the [facility] liaison RUDY Solis verified HC orders were received and scheduling will be reaching out to patient.     Patient: Fe Monroy Patient : 1935 MRN: [id number]    Last Discharge Facility       Date Complaint Diagnosis Description Type Department Provider    3/13/24 Fall Acute cystitis with hematuria ... ED to Hosp-Admission (Discharged) (ADMITTED) [place] [facility] Reid Hdz MD; Hiram Phelps...              Was this an external facility discharge? No Discharge Facility Name: n.a    Noted following upcoming appointments from discharge chart review:   [facility] follow up appointment(s):   Future Appointments   Date Time Provider Department Center   2024 11:00 AM Gilbert Santos DO [facility]     Non-[facility]  follow up appointment(s): .    Ayse ALFONSO, RN, [facility]  Care Transition Nurse  [phone] mobile

## 2024-03-18 ENCOUNTER — CARE COORDINATION (OUTPATIENT)
Dept: CASE MANAGEMENT | Age: 89
End: 2024-03-18

## 2024-03-18 ENCOUNTER — TELEPHONE (OUTPATIENT)
Dept: FAMILY MEDICINE CLINIC | Age: 89
End: 2024-03-18

## 2024-03-18 NOTE — CARE COORDINATION
Care Transitions Outreach Attempt    Call within 2 business days of discharge: Yes   Attempted to reach patient for transitions of care follow up. Unable to reach patient. LVM.   CTN followed up with [facility] liaison again Joanne Solis who verified she received a vm from patient's daughter who declined need for HC.     Patient: Fe Monroy Patient : 1935 MRN: [id number]    Last Discharge Facility       Date Complaint Diagnosis Description Type Department Provider    3/13/24 Fall Acute cystitis with hematuria ... ED to Hosp-Admission (Discharged) (ADMITTED) [facility] Reid Hdz MD; Hiram Phelps...              Was this an external facility discharge? No Discharge Facility Name: n.a    Noted following upcoming appointments from discharge chart review:   [facility] follow up appointment(s):   Future Appointments   Date Time Provider Department Center   2024 11:00 AM Gilbert Santso DO [facility]     Non-[facility]  follow up appointment(s): .    Ayse ALFONSO, RN, [facility]  Care Transition Nurse  [phone] mobile

## 2024-03-18 NOTE — CARE COORDINATION
CASE MANAGEMENT DISCHARGE SUMMARY      Discharge to: Home w/ [facility] RN/PT/OT    Precertification completed: N/A  Hospital Exemption Notification (HENS) completed: N/A    IMM given: (date) 3/14/24    New Durable Medical Equipment ordered/agency: N/A    Transportation:    Family/car: Personal      Confirmed discharge plan with:     Patient: yes     Family:  yes       RN, name: Fawn    Note: Discharging nurse to complete LESIA, reconcile AVS, and place final copy with patient's discharge packet. RN to ensure that written prescriptions for  Level II medications are sent with patient to the facility as per protocol.    Teresita Conley RN      
Nonadmit [facility]  Refused home health care; doesn't feel needed    Joanne Solis RN, BSN -[phone]  [facility]   [phone] fax [phone]  [facility] -[phone]  [facility] -[phone]    
[facility]    DC order noted, all docs needed have been faxed to [facility] for home care services.    Home care to see patient within 24-48 hrs    Joanne Solis RN, BSN CTN  [facility] [phone]    
[facility]    Referral received from  to follow for home care services.   I will follow for needs, and speak with patient to verify demos.    Joanne Solis RN, BSN -[phone]  [facility]   [phone] fax [phone]  [facility] -[phone]  [facility] -[phone]    
  Patient expects to discharge to: House  Plan for transportation at discharge:      Financial    Payor: [employer] MEDICARE / Plan: [employer] CHOICE-PPO MEDICARE / Product Type: *No Product type* /     Does insurance require precert for SNF: Yes    Potential assistance Purchasing Medications: No  Meds-to-Beds request: Yes      [facility] - [place], [place] - [address] - P [phone] - F [phone] [address] [facility] [address]  Phone: [phone] Fax: [phone]    [facility]  [place], [place] - [address] - P [phone] - F [phone]  [address] [facility] [address]  Phone: [phone] Fax: [phone]    [facility]/pharmacy #[id number] [place], [place] - [address] -  [phone] - F [phone]  [address] [address]  Phone: [phone] Fax: [phone]    [facility]/pharmacy #[id number] - [place], [place] - [address] - P [phone] - F [phone]  [address] [facility] [address]  Phone: [phone] Fax: [phone]      Notes:    Factors facilitating achievement of predicted outcomes: Family support, Motivated, Cooperative, and Pleasant    Barriers to discharge: Limited family support and No caregiver support    Additional Case Management Notes: Patient lives alone in a with 2 MUNA. Patient IPTA, uses a walker for mobility at home. Patient is not an active , her children transport her. Patient has a PCP. No current services in the home at this time. DCP will continue to follow.     The Plan for Transition of Care is related to the following treatment goals of Generalized weakness [R53.1]  Acute cystitis with hematuria [N30.01]    IF APPLICABLE: The Patient and/or patient representative Fe and her family were provided with a choice of provider and agrees with the discharge plan. Freedom of choice list with basic dialogue that supports the patient's individualized plan of care/goals and shares the quality data associated with the

## 2024-03-18 NOTE — TELEPHONE ENCOUNTER
Thank you for the update and letting us know about declining HC.. We have also been unable to reach patient but we will continue trying.

## 2024-03-18 NOTE — TELEPHONE ENCOUNTER
Care Transitions Initial Follow Up Call    Outreach made within 2 business days of discharge: Yes    Patient: Fe Monroy Patient : 1935   MRN: [id number]  Reason for Admission: There are no discharge diagnoses documented for the most recent discharge.  Discharge Date: 3/14/24       Spoke with: Called patient LVM for the patient to return the call to the office.  Also called patient's children and left messages for her to return call to the office.    Discharge department/facility: A       Follow Up  Future Appointments   Date Time Provider Department Center   2024 11:00 AM Gilbert Santos DO [facility] Michelle Motz, LPN

## 2024-03-18 NOTE — TELEPHONE ENCOUNTER
Care Transitions Initial Follow Up Call    Outreach made within 2 business days of discharge: Yes    Patient: Fe Monroy Patient : 1935   MRN: [id number]  Reason for Admission: There are no discharge diagnoses documented for the most recent discharge.  Discharge Date: 3/14/24       Spoke with: Jeanetteamanda    Discharge department/facility: [facility] Interactive Patient Contact:  Was patient able to fill all prescriptions: Yes  Was patient instructed to bring all medications to the follow-up visit: Yes  Is patient taking all medications as directed in the discharge summary? Yes  Does patient understand their discharge instructions: Yes  Does patient have questions or concerns that need addressed prior to 7-14 day follow up office visit: no    Scheduled appointment with PCP within 7-14 days    Follow Up  Future Appointments   Date Time Provider Department Center   3/28/2024  2:00 PM Beatris Manzanares DO [facility]   2024 11:00 AM Gilbert Santos DO [facility] Stephanie Gasnik, LPN

## 2024-03-28 ENCOUNTER — OFFICE VISIT (OUTPATIENT)
Dept: FAMILY MEDICINE CLINIC | Age: 89
End: 2024-03-28

## 2024-03-28 VITALS
WEIGHT: 185 LBS | HEART RATE: 78 BPM | OXYGEN SATURATION: 94 % | DIASTOLIC BLOOD PRESSURE: 82 MMHG | SYSTOLIC BLOOD PRESSURE: 124 MMHG | BODY MASS INDEX: 32.77 KG/M2

## 2024-03-28 DIAGNOSIS — E11.40 TYPE 2 DIABETES MELLITUS WITH DIABETIC NEUROPATHY, WITHOUT LONG-TERM CURRENT USE OF INSULIN (HCC): ICD-10-CM

## 2024-03-28 DIAGNOSIS — Z09 HOSPITAL DISCHARGE FOLLOW-UP: Primary | ICD-10-CM

## 2024-03-28 DIAGNOSIS — N30.00 ACUTE CYSTITIS WITHOUT HEMATURIA: ICD-10-CM

## 2024-03-28 LAB
BILIRUBIN, POC: NEGATIVE
BLOOD URINE, POC: NEGATIVE
CLARITY, POC: NORMAL
COLOR, POC: YELLOW
GLUCOSE URINE, POC: 500
KETONES, POC: NEGATIVE
LEUKOCYTE EST, POC: NEGATIVE
NITRITE, POC: NORMAL
PH, POC: 7
PROTEIN, POC: NORMAL
SPECIFIC GRAVITY, POC: 1.02
UROBILINOGEN, POC: 0.2

## 2024-03-28 NOTE — PROGRESS NOTES
Post-Discharge Transitional Care  Follow Up      Fe Monroy   YOB: 1935    Date of Office Visit:  3/28/2024  Date of Hospital Admission: 3/13/24  Date of Hospital Discharge: 3/14/24  Risk of hospital readmission (high >=14%. Medium >=10%) :Readmission Risk Score: 12.9      Care management risk score Rising risk (score 2-5) and Complex Care (Scores >=6): No Risk Score On File     Non face to face  following discharge, date last encounter closed (first attempt may have been earlier): 03/18/2024    Call initiated 2 business days of discharge: Yes    ASSESSMENT/PLAN:   Hospital discharge follow-up  -     AR DISCHARGE MEDS RECONCILED W/ CURRENT OUTPATIENT MED LIST  Acute cystitis without hematuria  -     POCT Urinalysis no Micro  Type 2 diabetes mellitus with diabetic neuropathy, without long-term current use of insulin (HCC)      Medical Decision Making: moderate complexity  No follow-ups on file.    On this date 3/28/2024 I have spent 30 minutes reviewing previous notes, test results and face to face with the patient discussing the diagnosis and importance of compliance with the treatment plan as well as documenting on the day of the visit.       Subjective:   HPI:  Follow up of Hospital problems/diagnosis(es):     Acute cystitis.  99.5 temp here, some tachycardia.  Group B strep seen on culture the day prior to admission.  Changed from bactrim to cefuroxime, will discharge with another 5 days PO.     Trop elevation due to demand ischemia from above.      Acute metabolic encephalopathy, manifesting as dysarthria.  Resolved.  CVA ruled out on MRI.  Carotid US OK.  No events on tele.  Clinically doubt TIA.     Muscular deconditioning.  PT/OT rec'd home care.     DM2.  Continue metformin.  Resume empagliflozin in 7 days, reconsider if she keeps getting UTIs.    Inpatient course: Discharge summary reviewed- see chart.    Interval history/Current status: improved    Patient Active Problem List

## 2024-05-21 ENCOUNTER — TELEPHONE (OUTPATIENT)
Dept: FAMILY MEDICINE CLINIC | Age: 89
End: 2024-05-21

## 2024-05-21 NOTE — TELEPHONE ENCOUNTER
It is usually very hard to treat neuropathy.  We tend to start with gabapentin but for many people it does not work very well.  Lyrica is another option but is very sedating and I am uncomfortable prescribing that at her age.  If her neuropathy is severe we can always refer her to a neurologist to get their opinion.  Frequently neuropathy is very difficult to treat.

## 2024-05-21 NOTE — TELEPHONE ENCOUNTER
Received phone call from Jeanette carrera)  pt daughter ok per HIPAA, she stated that the gabapentin that was prescribed previously for pts neuropathy did not go well with patient, she stated that patient is asking for an alternative for her neuropathy that will go well with her during the day and help with her neuropathy. Please adivse

## 2024-05-21 NOTE — TELEPHONE ENCOUNTER
Called Jeanette and informed her. Offered the appt for patient, she declined. Was wanting to just do a telephone visit. Informed her that we don't do those anymore as insurance is not covering it. Offered for VV, declined as well. Jeanette is wanting to know if Dr Manzanares would be able to send something in then.

## 2024-05-22 RX ORDER — METOPROLOL SUCCINATE 25 MG/1
TABLET, EXTENDED RELEASE ORAL
Qty: 90 TABLET | Refills: 3 | Status: SHIPPED | OUTPATIENT
Start: 2024-05-22

## 2024-05-22 NOTE — TELEPHONE ENCOUNTER
Refill Request     CONFIRM preferred pharmacy with the patient.    If Mail Order Rx - Pend for 90 day refill.      Last Seen: Last Seen Department: 3/28/2024  Last Seen by PCP: 3/4/2024    Last Written: 4/10/23 90 with 3 refills     If no future appointment scheduled:  Review the last OV with PCP and review information for follow-up visit,  Route STAFF MESSAGE with patient name to the  Pool for scheduling with the following information:            -  Timing of next visit           -  Visit type ie Physical, OV, etc           -  Diagnoses/Reason ie. COPD, HTN - Do not use MEDICATION, Follow-up or CHECK UP - Give reason for visit      Next Appointment:   Future Appointments   Date Time Provider Department Center   9/4/2024 11:00 AM Gilbert Santos DO [facility]       Message sent to  to schedule appt with patient?  NO      Requested Prescriptions     Pending Prescriptions Disp Refills    metoprolol succinate (TOPROL XL) 25 MG extended release tablet [Pharmacy Med Name: METOPROLOL SUCCINATE ER 25 MG Tablet Extended Release 24 Hour] 90 tablet 3     Sig: TAKE 1 TABLET EVERY DAY

## 2024-05-22 NOTE — TELEPHONE ENCOUNTER
Left VM for daughter Jeanette (on HIPAA) to call back. Please give her Dr. Manzanares message when she calls back. Thanks!

## 2024-11-04 ENCOUNTER — TELEPHONE (OUTPATIENT)
Dept: FAMILY MEDICINE CLINIC | Age: 89
End: 2024-11-04

## 2024-11-04 NOTE — TELEPHONE ENCOUNTER
Received phone call from Aurora regarding she is going to be faxing paper work over from [employer] whom pays for her jardiance and they are needing this to be signed and completed by the PCP and faxed over to the company.     Please advise on completing and faxing this form.

## 2024-11-05 NOTE — TELEPHONE ENCOUNTER
Spoke with daughter Aurora(on HIPAA) and let her know forms are complete. Emailing to her per her request.

## 2024-11-06 DIAGNOSIS — E78.2 MIXED HYPERLIPIDEMIA: ICD-10-CM

## 2024-11-06 DIAGNOSIS — M15.0 PRIMARY OSTEOARTHRITIS INVOLVING MULTIPLE JOINTS: ICD-10-CM

## 2024-11-06 RX ORDER — ALLOPURINOL 100 MG/1
100 TABLET ORAL DAILY
Qty: 90 TABLET | Refills: 1 | Status: SHIPPED | OUTPATIENT
Start: 2024-11-06

## 2024-11-06 RX ORDER — CLOPIDOGREL BISULFATE 75 MG/1
75 TABLET ORAL DAILY
Qty: 90 TABLET | Refills: 1 | Status: SHIPPED | OUTPATIENT
Start: 2024-11-06

## 2024-11-06 RX ORDER — EZETIMIBE 10 MG/1
10 TABLET ORAL DAILY
Qty: 90 TABLET | Refills: 1 | Status: SHIPPED | OUTPATIENT
Start: 2024-11-06

## 2024-11-06 NOTE — TELEPHONE ENCOUNTER
.Refill Request     CONFIRM preferred pharmacy with the patient.    If Mail Order Rx - Pend for 90 day refill.      Last Seen: Last Seen Department: 3/28/2024  Last Seen by PCP: 3/4/2024    Last Written: 1-17-24 90 with 3     If no future appointment scheduled:  Review the last OV with PCP and review information for follow-up visit,  Route STAFF MESSAGE with patient name to the  Pool for scheduling with the following information:            -  Timing of next visit           -  Visit type ie Physical, OV, etc           -  Diagnoses/Reason ie. COPD, HTN - Do not use MEDICATION, Follow-up or CHECK UP - Give reason for visit      Next Appointment:   No future appointments.    Message sent to  to schedule appt with patient?  YES      Requested Prescriptions     Pending Prescriptions Disp Refills    clopidogrel (PLAVIX) 75 MG tablet 90 tablet 1     Sig: Take 1 tablet by mouth daily    ezetimibe (ZETIA) 10 MG tablet 90 tablet 1     Sig: Take 1 tablet by mouth daily    allopurinol (ZYLOPRIM) 100 MG tablet 90 tablet 1     Sig: Take 1 tablet by mouth daily

## 2024-11-14 NOTE — PROGRESS NOTES
PCP: Nicky Aldrich MD     Last appt:  10/7/2024      Future Appointments   Date Time Provider Department Center   2/5/2025 11:30 AM Nicky Aldrich MD [facility]          Requested Prescriptions     Pending Prescriptions Disp Refills    insulin aspart prot & aspart (NOVOLOG MIX 70/30 FLEXPEN) injection pen [Pharmacy Med Name: NovoLOG Mix 70/30 FlexPen Subcutaneous Suspension Pen-injector (70-30) 100 UNIT/ML] 60 mL 3     Sig: INJECT 35 UNITS INTO THE SKIN 2 TIMES DAILY (BEFORE MEALS) BREAKFAST AND SUPPER.       Spoke with pt's daughter Constance [address], informed [address] that we don't have a transfer time yet. [address] requested that we call the pt's daughter Zenon Herrmann when we have a time, and if Zenon Herrmann doesn't answer to call [address]. Will pass this along to day shift.

## 2024-12-04 RX ORDER — BETAMETHASONE DIPROPIONATE 0.5 MG/G
CREAM TOPICAL 2 TIMES DAILY
Qty: 50 G | Refills: 2 | Status: SHIPPED | OUTPATIENT
Start: 2024-12-04

## 2024-12-04 NOTE — TELEPHONE ENCOUNTER
Refill Request     CONFIRM preferred pharmacy with the patient.    If Mail Order Rx - Pend for 90 day refill.      Last Seen: Last Seen Department: 3/28/2024  Last Seen by PCP: 3/4/2024    Last Written: Never been prescribed by PCP    If no future appointment scheduled:  Review the last OV with PCP and review information for follow-up visit,  Route STAFF MESSAGE with patient name to the  Pool for scheduling with the following information:            -  Timing of next visit           -  Visit type ie Physical, OV, etc           -  Diagnoses/Reason ie. COPD, HTN - Do not use MEDICATION, Follow-up or CHECK UP - Give reason for visit      Next Appointment:   Future Appointments   Date Time Provider Department Center   12/16/2024  1:30 PM Gilbert Santos, DO [facility] DE[facility]       Message sent to  to schedule appt with patient?  NO      Requested Prescriptions     Pending Prescriptions Disp Refills    augmented betamethasone dipropionate (DIPROLENE-AF) 0.05 % cream [Pharmacy Med Name: Betamethasone Dipropionate Aug External Cream 0.05 %] 50 g 11     Sig: APPLY TOPICALLY 2 TIMES DAILY

## 2024-12-16 ENCOUNTER — OFFICE VISIT (OUTPATIENT)
Dept: FAMILY MEDICINE CLINIC | Age: 89
End: 2024-12-16
Payer: MEDICARE

## 2024-12-16 VITALS
WEIGHT: 186 LBS | SYSTOLIC BLOOD PRESSURE: 128 MMHG | HEART RATE: 88 BPM | BODY MASS INDEX: 32.96 KG/M2 | OXYGEN SATURATION: 96 % | HEIGHT: 63 IN | DIASTOLIC BLOOD PRESSURE: 78 MMHG

## 2024-12-16 DIAGNOSIS — I10 ESSENTIAL HYPERTENSION: ICD-10-CM

## 2024-12-16 DIAGNOSIS — G62.9 NEUROPATHY: ICD-10-CM

## 2024-12-16 DIAGNOSIS — E11.40 TYPE 2 DIABETES MELLITUS WITH DIABETIC NEUROPATHY, WITHOUT LONG-TERM CURRENT USE OF INSULIN (HCC): Primary | ICD-10-CM

## 2024-12-16 DIAGNOSIS — Z86.73 HISTORY OF LACUNAR CEREBROVASCULAR ACCIDENT (CVA): ICD-10-CM

## 2024-12-16 LAB
BILIRUBIN, POC: NORMAL
BLOOD URINE, POC: NORMAL
CLARITY, POC: NORMAL
COLOR, POC: YELLOW
CREATININE URINE POCT: 50
GLUCOSE URINE, POC: >1000 MG/DL
HBA1C MFR BLD: 6 %
KETONES, POC: NORMAL MG/DL
LEUKOCYTE EST, POC: NORMAL
MICROALBUMIN/CREAT 24H UR: 80 MG/DL
MICROALBUMIN/CREAT UR-RTO: >300 MG/G
NITRITE, POC: NORMAL
PH, POC: 6
PROTEIN, POC: 30 MG/DL
SPECIFIC GRAVITY, POC: 1.01
UROBILINOGEN, POC: 0.2 MG/DL

## 2024-12-16 PROCEDURE — 1090F PRES/ABSN URINE INCON ASSESS: CPT | Performed by: FAMILY MEDICINE

## 2024-12-16 PROCEDURE — G8484 FLU IMMUNIZE NO ADMIN: HCPCS | Performed by: FAMILY MEDICINE

## 2024-12-16 PROCEDURE — 99214 OFFICE O/P EST MOD 30 MIN: CPT | Performed by: FAMILY MEDICINE

## 2024-12-16 PROCEDURE — G8417 CALC BMI ABV UP PARAM F/U: HCPCS | Performed by: FAMILY MEDICINE

## 2024-12-16 PROCEDURE — 1123F ACP DISCUSS/DSCN MKR DOCD: CPT | Performed by: FAMILY MEDICINE

## 2024-12-16 PROCEDURE — 3044F HG A1C LEVEL LT 7.0%: CPT | Performed by: FAMILY MEDICINE

## 2024-12-16 PROCEDURE — 83036 HEMOGLOBIN GLYCOSYLATED A1C: CPT | Performed by: FAMILY MEDICINE

## 2024-12-16 PROCEDURE — 1159F MED LIST DOCD IN RCRD: CPT | Performed by: FAMILY MEDICINE

## 2024-12-16 PROCEDURE — 82044 UR ALBUMIN SEMIQUANTITATIVE: CPT | Performed by: FAMILY MEDICINE

## 2024-12-16 PROCEDURE — G8427 DOCREV CUR MEDS BY ELIG CLIN: HCPCS | Performed by: FAMILY MEDICINE

## 2024-12-16 PROCEDURE — 1036F TOBACCO NON-USER: CPT | Performed by: FAMILY MEDICINE

## 2024-12-16 PROCEDURE — 81002 URINALYSIS NONAUTO W/O SCOPE: CPT | Performed by: FAMILY MEDICINE

## 2024-12-16 SDOH — ECONOMIC STABILITY: FOOD INSECURITY: WITHIN THE PAST 12 MONTHS, YOU WORRIED THAT YOUR FOOD WOULD RUN OUT BEFORE YOU GOT MONEY TO BUY MORE.: NEVER TRUE

## 2024-12-16 SDOH — ECONOMIC STABILITY: FOOD INSECURITY: WITHIN THE PAST 12 MONTHS, THE FOOD YOU BOUGHT JUST DIDN'T LAST AND YOU DIDN'T HAVE MONEY TO GET MORE.: NEVER TRUE

## 2024-12-16 SDOH — ECONOMIC STABILITY: INCOME INSECURITY: HOW HARD IS IT FOR YOU TO PAY FOR THE VERY BASICS LIKE FOOD, HOUSING, MEDICAL CARE, AND HEATING?: NOT HARD AT ALL

## 2024-12-16 ASSESSMENT — ENCOUNTER SYMPTOMS
CONSTIPATION: 0
CHEST TIGHTNESS: 0
NAUSEA: 0
DIARRHEA: 0
ABDOMINAL DISTENTION: 0
VOMITING: 0
SHORTNESS OF BREATH: 1
ANAL BLEEDING: 0
ABDOMINAL PAIN: 0
EYE DISCHARGE: 0
BACK PAIN: 0
BLOOD IN STOOL: 0
EYE ITCHING: 0
VOICE CHANGE: 0
RHINORRHEA: 0
WHEEZING: 0
COUGH: 0
SORE THROAT: 0
SINUS PRESSURE: 0
EYE PAIN: 0
TROUBLE SWALLOWING: 0
EYE REDNESS: 0

## 2024-12-16 NOTE — PROGRESS NOTES
No scleral icterus.     Conjunctiva/sclera: Conjunctivae normal.   Neck:      Thyroid: No thyromegaly.      Vascular: No carotid bruit.   Cardiovascular:      Rate and Rhythm: Normal rate and regular rhythm.      Heart sounds: Normal heart sounds.   Pulmonary:      Effort: Pulmonary effort is normal.      Breath sounds: Normal breath sounds.   Abdominal:      General: Bowel sounds are normal. There is no distension.      Palpations: Abdomen is soft. There is no mass.      Tenderness: There is no abdominal tenderness.   Musculoskeletal:         General: No tenderness. Normal range of motion.      Cervical back: Neck supple.   Lymphadenopathy:      Cervical: No cervical adenopathy.   Skin:     General: Skin is warm and dry.      Coloration: Skin is not pale.   Neurological:      Mental Status: She is alert and oriented to person, place, and time.      Motor: No abnormal muscle tone.      Gait: Gait abnormal.      Comments: Uses walker for balance and safety issues.   Psychiatric:         Mood and Affect: Mood normal.         Behavior: Behavior normal.         Thought Content: Thought content normal.         Judgment: Judgment normal.         Assessment:       Diagnosis Orders   1. Type 2 diabetes mellitus with diabetic neuropathy, without long-term current use of insulin (HCC)  POCT glycosylated hemoglobin (Hb A1C)    POCT Urinalysis no Micro    POCT microalbumin      2. Essential hypertension  POCT Urinalysis no Micro    POCT microalbumin      3. History of lacunar cerebrovascular accident (CVA)        4. Neuropathy              Plan:      Fe was seen today for 6 month follow-up and diabetes.    Diagnoses and all orders for this visit:    Type 2 diabetes mellitus with diabetic neuropathy, without long-term current use of insulin (HCC)  -     POCT glycosylated hemoglobin (Hb A1C)  -     POCT Urinalysis no Micro  -     POCT microalbumin  A1c 6.0-continue medications-limit carbohydrates and stay as active as

## 2025-04-07 DIAGNOSIS — M15.0 PRIMARY OSTEOARTHRITIS INVOLVING MULTIPLE JOINTS: ICD-10-CM

## 2025-04-07 DIAGNOSIS — E79.0 HYPERURICEMIA: Primary | ICD-10-CM

## 2025-04-07 RX ORDER — CLOPIDOGREL BISULFATE 75 MG/1
75 TABLET ORAL DAILY
Qty: 30 TABLET | Refills: 0 | Status: SHIPPED | OUTPATIENT
Start: 2025-04-07

## 2025-04-07 RX ORDER — ALLOPURINOL 100 MG/1
100 TABLET ORAL DAILY
Qty: 90 TABLET | Refills: 1 | OUTPATIENT
Start: 2025-04-07

## 2025-04-07 NOTE — TELEPHONE ENCOUNTER
Refill Request     CONFIRM preferred pharmacy with the patient.    If Mail Order Rx - Pend for 90 day refill.      Last Seen: Last Seen Department: 12/16/2024  Last Seen by PCP: Visit date not found    Last Written: 11/6/24 90 tabs 1 refills     If no future appointment scheduled:  Review the last OV with PCP and review information for follow-up visit,  Route STAFF MESSAGE with patient name to the  Pool for scheduling with the following information:            -  Timing of next visit           -  Visit type ie Physical, OV, etc           -  Diagnoses/Reason ie. COPD, HTN - Do not use MEDICATION, Follow-up or CHECK UP - Give reason for visit      Next Appointment:   No future appointments.    Message sent to  to schedule appt with patient?  YES      Requested Prescriptions     Pending Prescriptions Disp Refills    clopidogrel (PLAVIX) 75 MG tablet 90 tablet 1     Sig: Take 1 tablet by mouth daily    allopurinol (ZYLOPRIM) 100 MG tablet 90 tablet 1     Sig: Take 1 tablet by mouth daily

## 2025-04-07 NOTE — TELEPHONE ENCOUNTER
Return in 6 months, May 2025.    Spoke with patient's daughter, lizeth Williamson per HIPAA, will call back to schedule after she speaks with the patient.

## 2025-04-09 DIAGNOSIS — E78.2 MIXED HYPERLIPIDEMIA: ICD-10-CM

## 2025-04-09 RX ORDER — EZETIMIBE 10 MG/1
10 TABLET ORAL DAILY
Qty: 90 TABLET | Refills: 1 | Status: SHIPPED | OUTPATIENT
Start: 2025-04-09

## 2025-04-09 NOTE — TELEPHONE ENCOUNTER
Refill Request     CONFIRM preferred pharmacy with the patient.    If Mail Order Rx - Pend for 90 day refill.      Last Seen: Last Seen Department: 12/16/2024  Last Seen by PCP: Visit date not found    Last Written: 11/6/24 90 with 1 refill     If no future appointment scheduled:  Review the last OV with PCP and review information for follow-up visit,  Route STAFF MESSAGE with patient name to the  Pool for scheduling with the following information:            -  Timing of next visit           -  Visit type ie Physical, OV, etc           -  Diagnoses/Reason ie. COPD, HTN - Do not use MEDICATION, Follow-up or CHECK UP - Give reason for visit      Next Appointment:   Future Appointments   Date Time Provider Department Center   5/30/2025  9:30 AM Kira Chino MD [facility]       Message sent to  to schedule appt with patient?  NO      Requested Prescriptions      No prescriptions requested or ordered in this encounter

## 2025-04-11 DIAGNOSIS — M15.0 PRIMARY OSTEOARTHRITIS INVOLVING MULTIPLE JOINTS: ICD-10-CM

## 2025-04-11 NOTE — TELEPHONE ENCOUNTER
Refill Request     CONFIRM preferred pharmacy with the patient.    If Mail Order Rx - Pend for 90 day refill.      Last Seen: Last Seen Department: 12/16/2024  Last Seen by PCP: 12/16/2024    Last Written: 11/6/24     If no future appointment scheduled:  Review the last OV with PCP and review information for follow-up visit,  Route STAFF MESSAGE with patient name to the  Pool for scheduling with the following information:            -  Timing of next visit           -  Visit type ie Physical, OV, etc           -  Diagnoses/Reason ie. COPD, HTN - Do not use MEDICATION, Follow-up or CHECK UP - Give reason for visit      Next Appointment:   Future Appointments   Date Time Provider Department Center   5/30/2025  9:30 AM Kira Chino MD [facility] DEP       Message sent to  to schedule appt with patient?  NO      Requested Prescriptions     Pending Prescriptions Disp Refills    allopurinol (ZYLOPRIM) 100 MG tablet 90 tablet 1     Sig: Take 1 tablet by mouth daily

## 2025-04-15 NOTE — TELEPHONE ENCOUNTER
Pharmacy called again. Advised pcp retired and awaiting response if will be filled prior to being seen.

## 2025-04-16 RX ORDER — ALLOPURINOL 100 MG/1
100 TABLET ORAL DAILY
Qty: 60 TABLET | Refills: 0 | Status: SHIPPED | OUTPATIENT
Start: 2025-04-16

## 2025-04-16 NOTE — TELEPHONE ENCOUNTER
Spoke with daughter Christen (on HIPAA) and relayed message. Per Christen, patient doesn't plan on doing the blood work until her appt in May.

## 2025-04-16 NOTE — TELEPHONE ENCOUNTER
Has been refused previously as patient has not had updated labs for kidney function and uric acid. Will refill for short course with appt with me in May. Thanks!

## 2025-05-30 ENCOUNTER — OFFICE VISIT (OUTPATIENT)
Dept: FAMILY MEDICINE CLINIC | Age: 89
End: 2025-05-30
Payer: MEDICARE

## 2025-05-30 VITALS
BODY MASS INDEX: 34.3 KG/M2 | WEIGHT: 193.6 LBS | TEMPERATURE: 98 F | SYSTOLIC BLOOD PRESSURE: 138 MMHG | HEART RATE: 85 BPM | OXYGEN SATURATION: 97 % | DIASTOLIC BLOOD PRESSURE: 80 MMHG | HEIGHT: 63 IN

## 2025-05-30 DIAGNOSIS — L60.0 INGROWN TOENAIL OF LEFT FOOT: ICD-10-CM

## 2025-05-30 DIAGNOSIS — E11.40 TYPE 2 DIABETES MELLITUS WITH DIABETIC NEUROPATHY, WITHOUT LONG-TERM CURRENT USE OF INSULIN (HCC): ICD-10-CM

## 2025-05-30 DIAGNOSIS — E79.0 HYPERURICEMIA: ICD-10-CM

## 2025-05-30 DIAGNOSIS — R06.09 DYSPNEA ON EXERTION: ICD-10-CM

## 2025-05-30 DIAGNOSIS — E55.9 VITAMIN D DEFICIENCY: ICD-10-CM

## 2025-05-30 DIAGNOSIS — E11.40 TYPE 2 DIABETES MELLITUS WITH DIABETIC NEUROPATHY, WITHOUT LONG-TERM CURRENT USE OF INSULIN (HCC): Primary | ICD-10-CM

## 2025-05-30 DIAGNOSIS — G62.9 NEUROPATHY: ICD-10-CM

## 2025-05-30 LAB — HBA1C MFR BLD: 5.9 %

## 2025-05-30 PROCEDURE — 1159F MED LIST DOCD IN RCRD: CPT | Performed by: STUDENT IN AN ORGANIZED HEALTH CARE EDUCATION/TRAINING PROGRAM

## 2025-05-30 PROCEDURE — 83036 HEMOGLOBIN GLYCOSYLATED A1C: CPT | Performed by: STUDENT IN AN ORGANIZED HEALTH CARE EDUCATION/TRAINING PROGRAM

## 2025-05-30 PROCEDURE — 99214 OFFICE O/P EST MOD 30 MIN: CPT | Performed by: STUDENT IN AN ORGANIZED HEALTH CARE EDUCATION/TRAINING PROGRAM

## 2025-05-30 PROCEDURE — 1123F ACP DISCUSS/DSCN MKR DOCD: CPT | Performed by: STUDENT IN AN ORGANIZED HEALTH CARE EDUCATION/TRAINING PROGRAM

## 2025-05-30 PROCEDURE — G8417 CALC BMI ABV UP PARAM F/U: HCPCS | Performed by: STUDENT IN AN ORGANIZED HEALTH CARE EDUCATION/TRAINING PROGRAM

## 2025-05-30 PROCEDURE — 1090F PRES/ABSN URINE INCON ASSESS: CPT | Performed by: STUDENT IN AN ORGANIZED HEALTH CARE EDUCATION/TRAINING PROGRAM

## 2025-05-30 PROCEDURE — 3044F HG A1C LEVEL LT 7.0%: CPT | Performed by: STUDENT IN AN ORGANIZED HEALTH CARE EDUCATION/TRAINING PROGRAM

## 2025-05-30 PROCEDURE — G8427 DOCREV CUR MEDS BY ELIG CLIN: HCPCS | Performed by: STUDENT IN AN ORGANIZED HEALTH CARE EDUCATION/TRAINING PROGRAM

## 2025-05-30 PROCEDURE — 1036F TOBACCO NON-USER: CPT | Performed by: STUDENT IN AN ORGANIZED HEALTH CARE EDUCATION/TRAINING PROGRAM

## 2025-05-30 RX ORDER — FLUTICASONE PROPIONATE 50 MCG
1 SPRAY, SUSPENSION (ML) NASAL DAILY
Qty: 16 G | Refills: 0 | Status: SHIPPED | OUTPATIENT
Start: 2025-05-30

## 2025-05-30 RX ORDER — ALBUTEROL SULFATE 90 UG/1
2 INHALANT RESPIRATORY (INHALATION) 4 TIMES DAILY PRN
Qty: 18 G | Refills: 0 | Status: SHIPPED | OUTPATIENT
Start: 2025-05-30

## 2025-05-30 SDOH — ECONOMIC STABILITY: FOOD INSECURITY: WITHIN THE PAST 12 MONTHS, THE FOOD YOU BOUGHT JUST DIDN'T LAST AND YOU DIDN'T HAVE MONEY TO GET MORE.: NEVER TRUE

## 2025-05-30 SDOH — ECONOMIC STABILITY: FOOD INSECURITY: WITHIN THE PAST 12 MONTHS, YOU WORRIED THAT YOUR FOOD WOULD RUN OUT BEFORE YOU GOT MONEY TO BUY MORE.: NEVER TRUE

## 2025-05-30 ASSESSMENT — PATIENT HEALTH QUESTIONNAIRE - PHQ9
SUM OF ALL RESPONSES TO PHQ QUESTIONS 1-9: 0
SUM OF ALL RESPONSES TO PHQ QUESTIONS 1-9: 0
2. FEELING DOWN, DEPRESSED OR HOPELESS: NOT AT ALL
SUM OF ALL RESPONSES TO PHQ QUESTIONS 1-9: 0
SUM OF ALL RESPONSES TO PHQ QUESTIONS 1-9: 0
1. LITTLE INTEREST OR PLEASURE IN DOING THINGS: NOT AT ALL

## 2025-05-30 ASSESSMENT — ENCOUNTER SYMPTOMS
SHORTNESS OF BREATH: 1
VOMITING: 0
COUGH: 0
NAUSEA: 0
ABDOMINAL PAIN: 0

## 2025-05-30 NOTE — ASSESSMENT & PLAN NOTE
Hx of hyperuricemia on allopurinol; unsure of gout hx, will recheck uric acid. Consider stopping allopurinol.       Orders:    Uric Acid; Future

## 2025-05-30 NOTE — PATIENT INSTRUCTIONS
It was nice to see you in clinic today.     We will send you to the lab for some tests. Our office will call you with the results or we will reach out to you via [employer].      Here are the changes we made to your medications today:  --Recommend taking gabapentin at night     Recommend decreasing your intake of carbohydrates and sugar sweetened beverages.   Recommend heart healthy exercise.  Recommend avoiding tobacco products and limiting your alcohol use.       If you have any questions or concerns, please call [phone].    Best regards,  Dr. Kira Chino

## 2025-05-30 NOTE — ASSESSMENT & PLAN NOTE
A1C 5.9% controlled on jardiance - discussed stopping if recurrent UTIs or dehydration. Will obtain labs. Foot exam with neuropathy and onychomycosis - requesting podiatry referral. On zetia, not on ACE/ARB.     Orders:    POCT glycosylated hemoglobin (Hb A1C)    CBC with Auto Differential; Future    Comprehensive Metabolic Panel; Future    Hemoglobin A1C; Future    Lipid Panel; Future    Vitamin B12 & Folate; Future

## 2025-05-30 NOTE — PROGRESS NOTES
[facility]  2025    Fe Monroy (:  1935) is a 89 y.o. female, here for evaluation of the following medical concerns:    Chief Complaint   Patient presents with    New Patient     New to Provider        ASSESSMENT/ PLAN  Assessment & Plan  Type 2 diabetes mellitus with diabetic neuropathy, without long-term current use of insulin ([facility])    A1C 5.9% controlled on jardiance - discussed stopping if recurrent UTIs or dehydration. Will obtain labs. Foot exam with neuropathy and onychomycosis - requesting podiatry referral. On zetia, not on ACE/ARB.     Orders:    POCT glycosylated hemoglobin (Hb A1C)    CBC with Auto Differential; Future    Comprehensive Metabolic Panel; Future    Hemoglobin A1C; Future    Lipid Panel; Future    Vitamin B12 & Folate; Future    Ingrown toenail of left foot    Ingrown thickened nail on left foot, no evidence of infection or ulcers - requesting referral for podiatry.     Orders:    Geetha Diamond DPM, Podiatry, [facility]    Neuropathy    Chronic, work up below. On gabapentin 100mg - discussed taking at night instead of during the day.     Orders:    Hemoglobin A1C; Future    Vitamin B12 & Folate; Future    Dyspnea on exertion    Reports mild dyspnea on exertion with mild cough with constant nasal drainage and occasional wheeze since COVID, no fever/chills, cough is non-productive; lungs clear, will trial prn albuterol inhaler. Consider further work up if worsening.      Orders:    fluticasone (FLONASE) 50 MCG/ACT nasal spray; 1 spray by Each Nostril route daily    albuterol sulfate HFA (VENTOLIN HFA) 108 (90 Base) MCG/ACT inhaler; Inhale 2 puffs into the lungs 4 times daily as needed for Wheezing or Shortness of Breath    Hyperuricemia    Hx of hyperuricemia on allopurinol; unsure of gout hx, will recheck uric acid. Consider stopping allopurinol.       Orders:    Uric Acid; Future    Vitamin D deficiency    Hx of Vit D def - will

## 2025-05-31 LAB
25(OH)D3 SERPL-MCNC: 29.7 NG/ML
ALBUMIN SERPL-MCNC: 4.2 G/DL (ref 3.4–5)
ALBUMIN/GLOB SERPL: 1.8 {RATIO} (ref 1.1–2.2)
ALP SERPL-CCNC: 99 U/L (ref 40–129)
ALT SERPL-CCNC: 21 U/L (ref 10–40)
ANION GAP SERPL CALCULATED.3IONS-SCNC: 10 MMOL/L (ref 3–16)
AST SERPL-CCNC: 25 U/L (ref 15–37)
BASOPHILS # BLD: 0 K/UL (ref 0–0.2)
BASOPHILS NFR BLD: 0.6 %
BILIRUB SERPL-MCNC: 0.4 MG/DL (ref 0–1)
BUN SERPL-MCNC: 19 MG/DL (ref 7–20)
CALCIUM SERPL-MCNC: 10.2 MG/DL (ref 8.3–10.6)
CHLORIDE SERPL-SCNC: 105 MMOL/L (ref 99–110)
CHOLEST SERPL-MCNC: 232 MG/DL (ref 0–199)
CO2 SERPL-SCNC: 28 MMOL/L (ref 21–32)
CREAT SERPL-MCNC: 1.1 MG/DL (ref 0.6–1.2)
DEPRECATED RDW RBC AUTO: 15.4 % (ref 12.4–15.4)
EOSINOPHIL # BLD: 0.1 K/UL (ref 0–0.6)
EOSINOPHIL NFR BLD: 2.2 %
EST. AVERAGE GLUCOSE BLD GHB EST-MCNC: 119.8 MG/DL
FOLATE SERPL-MCNC: 37.5 NG/ML (ref 4.78–24.2)
GFR SERPLBLD CREATININE-BSD FMLA CKD-EPI: 48 ML/MIN/{1.73_M2}
GLUCOSE SERPL-MCNC: 106 MG/DL (ref 70–99)
HBA1C MFR BLD: 5.8 %
HCT VFR BLD AUTO: 41 % (ref 36–48)
HDLC SERPL-MCNC: 61 MG/DL (ref 40–60)
HGB BLD-MCNC: 13.5 G/DL (ref 12–16)
LDLC SERPL CALC-MCNC: 134 MG/DL
LYMPHOCYTES # BLD: 0.9 K/UL (ref 1–5.1)
LYMPHOCYTES NFR BLD: 16.5 %
MCH RBC QN AUTO: 28.5 PG (ref 26–34)
MCHC RBC AUTO-ENTMCNC: 33.1 G/DL (ref 31–36)
MCV RBC AUTO: 86.2 FL (ref 80–100)
MONOCYTES # BLD: 0.4 K/UL (ref 0–1.3)
MONOCYTES NFR BLD: 7.1 %
NEUTROPHILS # BLD: 3.8 K/UL (ref 1.7–7.7)
NEUTROPHILS NFR BLD: 73.6 %
PLATELET # BLD AUTO: 208 K/UL (ref 135–450)
PMV BLD AUTO: 9 FL (ref 5–10.5)
POTASSIUM SERPL-SCNC: 4.5 MMOL/L (ref 3.5–5.1)
PROT SERPL-MCNC: 6.6 G/DL (ref 6.4–8.2)
RBC # BLD AUTO: 4.75 M/UL (ref 4–5.2)
SODIUM SERPL-SCNC: 143 MMOL/L (ref 136–145)
TRIGL SERPL-MCNC: 184 MG/DL (ref 0–150)
URATE SERPL-MCNC: 4.4 MG/DL (ref 2.6–6)
VIT B12 SERPL-MCNC: 626 PG/ML (ref 211–911)
VLDLC SERPL CALC-MCNC: 37 MG/DL
WBC # BLD AUTO: 5.2 K/UL (ref 4–11)

## 2025-06-02 ENCOUNTER — RESULTS FOLLOW-UP (OUTPATIENT)
Dept: FAMILY MEDICINE CLINIC | Age: 89
End: 2025-06-02

## 2025-06-05 ENCOUNTER — TELEPHONE (OUTPATIENT)
Dept: FAMILY MEDICINE CLINIC | Age: 89
End: 2025-06-05

## 2025-06-06 NOTE — TELEPHONE ENCOUNTER
Dementia is usually a slow and progressive decline in memory over months to years versus delirium which is a reversible acute change in mental status that can be caused a variety of different things including infections, dehydration, constipation, medications, etc. Would recommend seeing back in clinic especially if worsening. Thanks!

## 2025-06-17 DIAGNOSIS — E78.2 MIXED HYPERLIPIDEMIA: ICD-10-CM

## 2025-06-17 RX ORDER — METOPROLOL SUCCINATE 25 MG/1
25 TABLET, EXTENDED RELEASE ORAL DAILY
Qty: 90 TABLET | Refills: 3 | Status: SHIPPED | OUTPATIENT
Start: 2025-06-17

## 2025-06-17 RX ORDER — EZETIMIBE 10 MG/1
10 TABLET ORAL DAILY
Qty: 90 TABLET | Refills: 1 | Status: SHIPPED | OUTPATIENT
Start: 2025-06-17

## 2025-06-17 RX ORDER — CLOPIDOGREL BISULFATE 75 MG/1
75 TABLET ORAL DAILY
Qty: 30 TABLET | Refills: 0 | Status: SHIPPED | OUTPATIENT
Start: 2025-06-17

## 2025-06-17 NOTE — TELEPHONE ENCOUNTER
Refill Request     CONFIRM preferred pharmacy with the patient.    If Mail Order Rx - Pend for 90 day refill.      Last Seen: Last Seen Department: 5/30/2025  Last Seen by PCP: Visit date not found    Last Written: 30 with 0 refills     If no future appointment scheduled:  Review the last OV with PCP and review information for follow-up visit,  Route STAFF MESSAGE with patient name to the  Pool for scheduling with the following information:            -  Timing of next visit           -  Visit type ie Physical, OV, etc           -  Diagnoses/Reason ie. COPD, HTN - Do not use MEDICATION, Follow-up or CHECK UP - Give reason for visit      Next Appointment:   Future Appointments   Date Time Provider Department Center   12/2/2025  9:30 AM Kira Chino MD [facility] DEP       Message sent to  to schedule appt with patient?  NO      Requested Prescriptions     Pending Prescriptions Disp Refills    ezetimibe (ZETIA) 10 MG tablet 90 tablet 1     Sig: Take 1 tablet by mouth daily    clopidogrel (PLAVIX) 75 MG tablet 30 tablet 0     Sig: Take 1 tablet by mouth daily

## 2025-06-17 NOTE — TELEPHONE ENCOUNTER
Refill Request     CONFIRM preferred pharmacy with the patient.    If Mail Order Rx - Pend for 90 day refill.      Last Seen: Last Seen Department: 5/30/2025  Last Seen by PCP: 5/30/2025    Last Written: 5/22/24  90 with 3 refills    If no future appointment scheduled:  Review the last OV with PCP and review information for follow-up visit,  Route STAFF MESSAGE with patient name to the  Pool for scheduling with the following information:            -  Timing of next visit           -  Visit type ie Physical, OV, etc           -  Diagnoses/Reason ie. COPD, HTN - Do not use MEDICATION, Follow-up or CHECK UP - Give reason for visit      Next Appointment:   Future Appointments   Date Time Provider Department Center   12/2/2025  9:30 AM Kira Chino MD [facility] DE[facility]       Message sent to  to schedule appt with patient?  NO      Requested Prescriptions     Pending Prescriptions Disp Refills    metoprolol succinate (TOPROL XL) 25 MG extended release tablet 90 tablet 3     Sig: Take 1 tablet by mouth daily

## 2025-06-21 DIAGNOSIS — R06.09 DYSPNEA ON EXERTION: ICD-10-CM

## 2025-06-23 RX ORDER — ALBUTEROL SULFATE 90 UG/1
2 INHALANT RESPIRATORY (INHALATION) 4 TIMES DAILY PRN
Qty: 18 EACH | Refills: 1 | Status: SHIPPED | OUTPATIENT
Start: 2025-06-23

## 2025-06-23 NOTE — TELEPHONE ENCOUNTER
Refill Request     CONFIRM preferred pharmacy with the patient.    If Mail Order Rx - Pend for 90 day refill.      Last Seen: Last Seen Department: 5/30/2025  Last Seen by PCP: 5/30/2025    Last Written: 5/30/25 18 g with no refills     If no future appointment scheduled:  Review the last OV with PCP and review information for follow-up visit,  Route STAFF MESSAGE with patient name to the  Pool for scheduling with the following information:            -  Timing of next visit           -  Visit type ie Physical, OV, etc           -  Diagnoses/Reason ie. COPD, HTN - Do not use MEDICATION, Follow-up or CHECK UP - Give reason for visit      Next Appointment:   Future Appointments   Date Time Provider Department Center   12/2/2025  9:30 AM Kira Chino MD [facility] DE[facility]       Message sent to  to schedule appt with patient?  NO      Requested Prescriptions     Pending Prescriptions Disp Refills    albuterol sulfate HFA (PROVENTIL;VENTOLIN;PROAIR) 108 (90 Base) MCG/ACT inhaler [Pharmacy Med Name: ALBUTEROL HFA (VENTOLIN) INH] 18 each      Sig: INHALE 2 PUFFS INTO THE LUNGS 4 TIMES DAILY AS NEEDED FOR WHEEZING OR SHORTNESS OF BREATH.

## 2025-07-26 DIAGNOSIS — R06.09 DYSPNEA ON EXERTION: ICD-10-CM

## 2025-07-28 RX ORDER — FLUTICASONE PROPIONATE 50 MCG
SPRAY, SUSPENSION (ML) NASAL
Qty: 16 ML | Refills: 0 | Status: SHIPPED | OUTPATIENT
Start: 2025-07-28

## 2025-07-28 NOTE — TELEPHONE ENCOUNTER
Refill Request     CONFIRM preferred pharmacy with the patient.    If Mail Order Rx - Pend for 90 day refill.      Last Seen: Last Seen Department: 5/30/2025  Last Seen by PCP: 5/30/2025    Last Written: 5/30/2025    If no future appointment scheduled:  Review the last OV with PCP and review information for follow-up visit,  Route STAFF MESSAGE with patient name to the  Pool for scheduling with the following information:            -  Timing of next visit           -  Visit type ie Physical, OV, etc           -  Diagnoses/Reason ie. COPD, HTN - Do not use MEDICATION, Follow-up or CHECK UP - Give reason for visit      Next Appointment:   Future Appointments   Date Time Provider Department Center   12/2/2025  9:30 AM Kira Chino MD [facility] DEP       Message sent to  to schedule appt with patient?  NO      Requested Prescriptions     Pending Prescriptions Disp Refills    fluticasone (FLONASE) 50 MCG/ACT nasal spray [Pharmacy Med Name: FLUTICASONE PROP 50 MCG SPRAY] 16 mL 0     Sig: SPRAY 1 SPRAY INTO EACH NOSTRIL EVERY DAY

## 2025-08-06 ENCOUNTER — OFFICE VISIT (OUTPATIENT)
Dept: FAMILY MEDICINE CLINIC | Age: 89
End: 2025-08-06
Payer: MEDICARE

## 2025-08-06 ENCOUNTER — HOSPITAL ENCOUNTER (OUTPATIENT)
Dept: GENERAL RADIOLOGY | Age: 89
Discharge: HOME | End: 2025-08-06
Payer: MEDICARE

## 2025-08-06 VITALS
HEIGHT: 63 IN | DIASTOLIC BLOOD PRESSURE: 80 MMHG | WEIGHT: 193 LBS | SYSTOLIC BLOOD PRESSURE: 134 MMHG | BODY MASS INDEX: 34.2 KG/M2 | HEART RATE: 82 BPM | OXYGEN SATURATION: 96 %

## 2025-08-06 DIAGNOSIS — R06.02 SHORTNESS OF BREATH: ICD-10-CM

## 2025-08-06 DIAGNOSIS — R06.09 DYSPNEA ON EXERTION: ICD-10-CM

## 2025-08-06 DIAGNOSIS — R60.0 LEG EDEMA: ICD-10-CM

## 2025-08-06 DIAGNOSIS — R60.0 LEG EDEMA: Primary | ICD-10-CM

## 2025-08-06 DIAGNOSIS — L60.0 INGROWN TOENAIL OF LEFT FOOT: ICD-10-CM

## 2025-08-06 DIAGNOSIS — M79.89 RIGHT LEG SWELLING: ICD-10-CM

## 2025-08-06 PROCEDURE — 71046 X-RAY EXAM CHEST 2 VIEWS: CPT

## 2025-08-06 PROCEDURE — G8417 CALC BMI ABV UP PARAM F/U: HCPCS | Performed by: STUDENT IN AN ORGANIZED HEALTH CARE EDUCATION/TRAINING PROGRAM

## 2025-08-06 PROCEDURE — 1036F TOBACCO NON-USER: CPT | Performed by: STUDENT IN AN ORGANIZED HEALTH CARE EDUCATION/TRAINING PROGRAM

## 2025-08-06 PROCEDURE — 99214 OFFICE O/P EST MOD 30 MIN: CPT | Performed by: STUDENT IN AN ORGANIZED HEALTH CARE EDUCATION/TRAINING PROGRAM

## 2025-08-06 PROCEDURE — 1090F PRES/ABSN URINE INCON ASSESS: CPT | Performed by: STUDENT IN AN ORGANIZED HEALTH CARE EDUCATION/TRAINING PROGRAM

## 2025-08-06 PROCEDURE — G8427 DOCREV CUR MEDS BY ELIG CLIN: HCPCS | Performed by: STUDENT IN AN ORGANIZED HEALTH CARE EDUCATION/TRAINING PROGRAM

## 2025-08-06 PROCEDURE — 1123F ACP DISCUSS/DSCN MKR DOCD: CPT | Performed by: STUDENT IN AN ORGANIZED HEALTH CARE EDUCATION/TRAINING PROGRAM

## 2025-08-06 PROCEDURE — 1159F MED LIST DOCD IN RCRD: CPT | Performed by: STUDENT IN AN ORGANIZED HEALTH CARE EDUCATION/TRAINING PROGRAM

## 2025-08-06 RX ORDER — BETAMETHASONE DIPROPIONATE 0.5 MG/G
CREAM TOPICAL 2 TIMES DAILY
Qty: 50 G | Refills: 2 | Status: SHIPPED | OUTPATIENT
Start: 2025-08-06

## 2025-08-07 ENCOUNTER — RESULTS FOLLOW-UP (OUTPATIENT)
Dept: FAMILY MEDICINE CLINIC | Age: 89
End: 2025-08-07

## 2025-08-07 ENCOUNTER — HOSPITAL ENCOUNTER (OUTPATIENT)
Dept: VASCULAR LAB | Age: 89
Discharge: HOME | End: 2025-08-09
Attending: STUDENT IN AN ORGANIZED HEALTH CARE EDUCATION/TRAINING PROGRAM
Payer: MEDICARE

## 2025-08-07 DIAGNOSIS — R06.02 SHORTNESS OF BREATH: Primary | ICD-10-CM

## 2025-08-07 DIAGNOSIS — R60.0 LEG EDEMA: ICD-10-CM

## 2025-08-07 DIAGNOSIS — M79.89 RIGHT LEG SWELLING: ICD-10-CM

## 2025-08-07 LAB
ALBUMIN SERPL-MCNC: 4.2 G/DL (ref 3.4–5)
ALBUMIN/GLOB SERPL: 1.7 {RATIO} (ref 1.1–2.2)
ALP SERPL-CCNC: 98 U/L (ref 40–129)
ALT SERPL-CCNC: 18 U/L (ref 10–40)
ANION GAP SERPL CALCULATED.3IONS-SCNC: 10 MMOL/L (ref 3–16)
AST SERPL-CCNC: 26 U/L (ref 15–37)
BILIRUB SERPL-MCNC: 0.3 MG/DL (ref 0–1)
BUN SERPL-MCNC: 18 MG/DL (ref 7–20)
CALCIUM SERPL-MCNC: 10.4 MG/DL (ref 8.3–10.6)
CHLORIDE SERPL-SCNC: 104 MMOL/L (ref 99–110)
CO2 SERPL-SCNC: 29 MMOL/L (ref 21–32)
CREAT SERPL-MCNC: 1.2 MG/DL (ref 0.6–1.2)
GFR SERPLBLD CREATININE-BSD FMLA CKD-EPI: 43 ML/MIN/{1.73_M2}
GLUCOSE SERPL-MCNC: 101 MG/DL (ref 70–99)
NT-PROBNP SERPL-MCNC: 6464 PG/ML (ref 0–449)
POTASSIUM SERPL-SCNC: 4.5 MMOL/L (ref 3.5–5.1)
PROT SERPL-MCNC: 6.7 G/DL (ref 6.4–8.2)
SODIUM SERPL-SCNC: 143 MMOL/L (ref 136–145)
TSH SERPL DL<=0.005 MIU/L-ACNC: 4.12 UIU/ML (ref 0.27–4.2)

## 2025-08-07 PROCEDURE — 93971 EXTREMITY STUDY: CPT

## 2025-08-07 ASSESSMENT — ENCOUNTER SYMPTOMS
NAUSEA: 0
ABDOMINAL PAIN: 0
SHORTNESS OF BREATH: 1
COUGH: 0
VOMITING: 0

## 2025-08-22 ENCOUNTER — HOSPITAL ENCOUNTER (OUTPATIENT)
Dept: CARDIOLOGY | Age: 89
Discharge: HOME | End: 2025-08-24
Attending: STUDENT IN AN ORGANIZED HEALTH CARE EDUCATION/TRAINING PROGRAM
Payer: MEDICARE

## 2025-08-22 VITALS
DIASTOLIC BLOOD PRESSURE: 80 MMHG | WEIGHT: 193 LBS | SYSTOLIC BLOOD PRESSURE: 134 MMHG | HEIGHT: 63 IN | BODY MASS INDEX: 34.2 KG/M2

## 2025-08-22 DIAGNOSIS — R06.02 SHORTNESS OF BREATH: ICD-10-CM

## 2025-08-22 LAB
ECHO AO ASC DIAM: 3.5 CM
ECHO AO ASCENDING AORTA INDEX: 1.84 CM/M2
ECHO AO ROOT DIAM: 3.2 CM
ECHO AO ROOT INDEX: 1.68 CM/M2
ECHO AV CUSP MM: 2.3 CM
ECHO AV MEAN GRADIENT: 4 MMHG
ECHO AV MEAN VELOCITY: 1 M/S
ECHO AV PEAK GRADIENT: 8 MMHG
ECHO AV PEAK GRADIENT: 8 MMHG
ECHO AV PEAK VELOCITY: 1.4 M/S
ECHO AV VELOCITY RATIO: 0.86
ECHO AV VTI: 27.4 CM
ECHO BSA: 1.97 M2
ECHO EST RA PRESSURE: 3 MMHG
ECHO LA AREA 2C: 18.8 CM2
ECHO LA AREA 4C: 17.6 CM2
ECHO LA DIAMETER INDEX: 1.89 CM/M2
ECHO LA DIAMETER: 3.6 CM
ECHO LA MAJOR AXIS: 5.1 CM
ECHO LA MINOR AXIS: 5.4 CM
ECHO LA TO AORTIC ROOT RATIO: 1.13
ECHO LA VOL BP: 53 ML (ref 22–52)
ECHO LA VOL MOD A2C: 55 ML (ref 22–52)
ECHO LA VOL MOD A4C: 48 ML (ref 22–52)
ECHO LA VOL/BSA BIPLANE: 28 ML/M2 (ref 16–34)
ECHO LA VOLUME INDEX MOD A2C: 29 ML/M2 (ref 16–34)
ECHO LA VOLUME INDEX MOD A4C: 25 ML/M2 (ref 16–34)
ECHO LV E' LATERAL VELOCITY: 5.87 CM/S
ECHO LV E' SEPTAL VELOCITY: 6.96 CM/S
ECHO LV EDV 3D: 169 ML
ECHO LV EDV INDEX 3D: 89 ML/M2
ECHO LV EJECTION FRACTION 3D: 39 %
ECHO LV ESV 3D: 103 ML
ECHO LV ESV INDEX 3D: 54 ML/M2
ECHO LV FRACTIONAL SHORTENING: 23 % (ref 28–44)
ECHO LV INTERNAL DIMENSION DIASTOLE INDEX: 2.74 CM/M2
ECHO LV INTERNAL DIMENSION DIASTOLIC: 5.2 CM (ref 3.9–5.3)
ECHO LV INTERNAL DIMENSION SYSTOLIC INDEX: 2.11 CM/M2
ECHO LV INTERNAL DIMENSION SYSTOLIC: 4 CM
ECHO LV ISOVOLUMETRIC RELAXATION TIME (IVRT): 124 MS
ECHO LV IVSD: 1.1 CM (ref 0.6–0.9)
ECHO LV MASS 2D: 220.8 G (ref 67–162)
ECHO LV MASS 3D INDEX: 91.1 G/M2
ECHO LV MASS 3D: 173 G
ECHO LV MASS INDEX 2D: 116.2 G/M2 (ref 43–95)
ECHO LV POSTERIOR WALL DIASTOLIC: 1.1 CM (ref 0.6–0.9)
ECHO LV RELATIVE WALL THICKNESS RATIO: 0.42
ECHO LVOT AV VTI INDEX: 0.78
ECHO LVOT MEAN GRADIENT: 3 MMHG
ECHO LVOT PEAK GRADIENT: 6 MMHG
ECHO LVOT PEAK VELOCITY: 1.2 M/S
ECHO LVOT VTI: 21.4 CM
ECHO MV A VELOCITY: 1.32 M/S
ECHO MV E VELOCITY: 0.61 M/S
ECHO MV E/A RATIO: 0.46
ECHO MV E/E' LATERAL: 10.39
ECHO MV E/E' RATIO (AVERAGED): 9.58
ECHO MV E/E' SEPTAL: 8.76
ECHO RA AREA 4C: 15 CM2
ECHO RA END SYSTOLIC VOLUME APICAL 4 CHAMBER INDEX BSA: 21 ML/M2
ECHO RA VOLUME: 40 ML
ECHO RIGHT VENTRICULAR SYSTOLIC PRESSURE (RVSP): 24 MMHG
ECHO RV FREE WALL PEAK S': 11.7 CM/S
ECHO RV TAPSE: 2 CM (ref 1.7–?)
ECHO TV REGURGITANT MAX VELOCITY: 2.28 M/S
ECHO TV REGURGITANT PEAK GRADIENT: 21 MMHG

## 2025-08-22 PROCEDURE — 93306 TTE W/DOPPLER COMPLETE: CPT

## 2025-08-25 PROCEDURE — 93306 TTE W/DOPPLER COMPLETE: CPT | Performed by: INTERNAL MEDICINE

## 2025-09-02 RX ORDER — CLOPIDOGREL BISULFATE 75 MG/1
75 TABLET ORAL DAILY
Qty: 90 TABLET | Refills: 1 | Status: SHIPPED | OUTPATIENT
Start: 2025-09-02